# Patient Record
Sex: MALE | Race: BLACK OR AFRICAN AMERICAN | NOT HISPANIC OR LATINO | URBAN - METROPOLITAN AREA
[De-identification: names, ages, dates, MRNs, and addresses within clinical notes are randomized per-mention and may not be internally consistent; named-entity substitution may affect disease eponyms.]

---

## 2016-03-29 RX ORDER — BENZTROPINE MESYLATE 1 MG
1 TABLET ORAL
Qty: 0 | Refills: 0 | DISCHARGE
Start: 2016-03-29 | End: 2016-04-28

## 2017-02-01 ENCOUNTER — EMERGENCY (EMERGENCY)
Facility: HOSPITAL | Age: 22
LOS: 1 days | Discharge: HOME | End: 2017-02-01
Admitting: EMERGENCY MEDICINE
Payer: MEDICAID

## 2017-02-01 VITALS
SYSTOLIC BLOOD PRESSURE: 159 MMHG | DIASTOLIC BLOOD PRESSURE: 89 MMHG | OXYGEN SATURATION: 100 % | RESPIRATION RATE: 16 BRPM | HEART RATE: 118 BPM | TEMPERATURE: 99 F

## 2017-02-01 VITALS — HEART RATE: 80 BPM | OXYGEN SATURATION: 98 %

## 2017-02-01 PROCEDURE — 73130 X-RAY EXAM OF HAND: CPT | Mod: 26,RT

## 2017-02-01 PROCEDURE — 99284 EMERGENCY DEPT VISIT MOD MDM: CPT

## 2017-02-01 PROCEDURE — 90792 PSYCH DIAG EVAL W/MED SRVCS: CPT
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
## 2017-02-01 NOTE — ED ADULT NURSE REASSESSMENT NOTE - NS ED NURSE REASSESS COMMENT FT1
20:25-Patient in improved and stable condition, discharged as per MD Monreal order, discharge instructions given, pt verbalized understanding and left ER a&ox3 with senior care ambulette service.

## 2017-02-01 NOTE — ED PROVIDER NOTE - CARE PLAN
Principal Discharge DX:	Mental retardation  Instructions for follow-up, activity and diet:	Return for swelling, discoloration, increasing pain, decrease range of motion.  Apply ice to area.

## 2017-02-01 NOTE — ED BEHAVIORAL HEALTH ASSESSMENT NOTE - NS ED BHA PLAN TR BH CONTACTED FT
Not available at this hour; Discussed w/ [facility] staff member who stated pt would follow up within the week
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
## 2017-02-01 NOTE — ED BEHAVIORAL HEALTH ASSESSMENT NOTE - SAFETY PLAN DETAILS
Advised to return to ED or call 911 for any suicidal ideation, homicidal thoughts or worsening symptoms. Patient able to recite understanding of instructions

## 2017-02-01 NOTE — ED BEHAVIORAL HEALTH ASSESSMENT NOTE - CURRENT MEDICATION
Cogentin 1mg q HS, Thorazine 25mg TID, Klonopin 0.5mg TID, Flovent HFA 110mcg 1 puff BID, Epipen PRN, Cetirizine 5mg q HS, Risperdal 1mg q HS, Risperdal 2mg BID, Depakote 500mg q AM, Depakote 750mg q HS, Zyprexa 15mg q HS, Prevident gel fresh mint bid, Metoprolol 12.5mg BID, colace 100mg TID, Albuterol HFA 2 puffs q 4 hours PRN, Maalox 30mL q 4 hours PRN
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
## 2017-02-01 NOTE — ED BEHAVIORAL HEALTH ASSESSMENT NOTE - RISK ASSESSMENT
Protective factors include no suicide attempts, medication compliance, supportive housing, no access to guns, no global insomnia, no substance abuse, no active suicidal ideation, no homicidal ideation, hopefulness for future. Risk factors include chronic aggression. Patient is not at any imminent risk for harming self/others and does not require an inpatient psychiatric admission at this time; would be best served by f/u with psychiatrist.
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
## 2017-02-01 NOTE — ED PROVIDER NOTE - CHPI ED SYMPTOMS NEG
no change in level of consciousness/no suicidal/no confusion/no weakness/no hallucinations/no paranoia/no homicidal/no weight loss/no disorientation

## 2017-02-01 NOTE — ED BEHAVIORAL HEALTH ASSESSMENT NOTE - OTHER
[facility] Worker Ms. Sanchez [facility] Worker [facility] Residence peers stereotyped movements euthymic concrete, consistent with MR

## 2017-02-01 NOTE — ED PROVIDER NOTE - PHYSICAL EXAMINATION
Rt hand- palmar region w/ very slight erythema and reports pain on exam- no deformity or swelling on exam

## 2017-02-01 NOTE — ED BEHAVIORAL HEALTH NOTE - BEHAVIORAL HEALTH NOTE
Writer called [facility], [phone], and spoke with Caron, who provided confirmation # [id number], for S ambulance service, to be provided by [facility], [phone], with an ETA of 8PM, to his address, verified to be: [address] [place] [address].

## 2017-02-06 ENCOUNTER — EMERGENCY (EMERGENCY)
Facility: HOSPITAL | Age: 22
LOS: 1 days | Discharge: HOME | End: 2017-02-06
Attending: EMERGENCY MEDICINE | Admitting: EMERGENCY MEDICINE
Payer: MEDICAID

## 2017-02-06 VITALS
OXYGEN SATURATION: 100 % | SYSTOLIC BLOOD PRESSURE: 150 MMHG | HEART RATE: 98 BPM | RESPIRATION RATE: 18 BRPM | TEMPERATURE: 98 F | DIASTOLIC BLOOD PRESSURE: 70 MMHG

## 2017-02-06 PROCEDURE — 99283 EMERGENCY DEPT VISIT LOW MDM: CPT

## 2017-02-06 NOTE — ED PROVIDER NOTE - DETAILS:
The scribe's documentation has been prepared under my direction and personally reviewed by me in its entirety. I confirm that the note above accurately reflects all work, treatment, procedures, and medical decision making performed by me (Dr. King).

## 2017-02-06 NOTE — ED PROVIDER NOTE - MEDICAL DECISION MAKING DETAILS
benign exam.  No evidence of abscess or cellulitis.  Will recommend warm compresses, avoidance of cologne to site, as worker reports that may have been what he put under armpits.

## 2017-02-06 NOTE — ED PROVIDER NOTE - NS ED MD SCRIBE ATTENDING SCRIBE SECTIONS
PHYSICAL EXAM/REVIEW OF SYSTEMS/DISPOSITION/HISTORY OF PRESENT ILLNESS/HIV/VITAL SIGNS( Pullset)/PAST MEDICAL/SURGICAL/SOCIAL HISTORY

## 2017-02-06 NOTE — ED PROVIDER NOTE - OBJECTIVE STATEMENT
20 y/o M w/ PMHx of asthma, autism, MR, mood disorder and Parkinson disease, presents to the ED c/o b/l armpit rash since last night. As per pt aide, pt was seen here in the past for similar sx, was given Hydrocortisone w/ relief. Pt aide states pt had rash last time due to wearing same the same shirt repeatedly. Denies fever, chills or any other complaints. NKDA.

## 2017-03-09 ENCOUNTER — EMERGENCY (EMERGENCY)
Facility: HOSPITAL | Age: 22
LOS: 1 days | Discharge: HOME | End: 2017-03-09
Admitting: EMERGENCY MEDICINE
Payer: MEDICAID

## 2017-03-09 VITALS
OXYGEN SATURATION: 100 % | DIASTOLIC BLOOD PRESSURE: 95 MMHG | SYSTOLIC BLOOD PRESSURE: 149 MMHG | TEMPERATURE: 98 F | HEART RATE: 100 BPM | RESPIRATION RATE: 20 BRPM

## 2017-03-09 VITALS
SYSTOLIC BLOOD PRESSURE: 115 MMHG | OXYGEN SATURATION: 100 % | RESPIRATION RATE: 20 BRPM | TEMPERATURE: 97 F | HEART RATE: 72 BPM | DIASTOLIC BLOOD PRESSURE: 73 MMHG

## 2017-03-09 LAB
ALBUMIN SERPL ELPH-MCNC: 4.7 G/DL — SIGNIFICANT CHANGE UP (ref 3.3–5)
ALP SERPL-CCNC: 43 U/L — SIGNIFICANT CHANGE UP (ref 40–120)
ALT FLD-CCNC: 9 U/L — SIGNIFICANT CHANGE UP (ref 4–41)
AST SERPL-CCNC: 20 U/L — SIGNIFICANT CHANGE UP (ref 4–40)
BASE EXCESS BLDV CALC-SCNC: 2.9 MMOL/L — SIGNIFICANT CHANGE UP
BASOPHILS # BLD AUTO: 0.03 K/UL — SIGNIFICANT CHANGE UP (ref 0–0.2)
BASOPHILS NFR BLD AUTO: 0.8 % — SIGNIFICANT CHANGE UP (ref 0–2)
BILIRUB SERPL-MCNC: 0.3 MG/DL — SIGNIFICANT CHANGE UP (ref 0.2–1.2)
BLOOD GAS VENOUS - CREATININE: 0.84 MG/DL — SIGNIFICANT CHANGE UP (ref 0.5–1.3)
BUN SERPL-MCNC: 15 MG/DL — SIGNIFICANT CHANGE UP (ref 7–23)
CALCIUM SERPL-MCNC: 10 MG/DL — SIGNIFICANT CHANGE UP (ref 8.4–10.5)
CHLORIDE BLDV-SCNC: 104 MMOL/L — SIGNIFICANT CHANGE UP (ref 96–108)
CHLORIDE SERPL-SCNC: 101 MMOL/L — SIGNIFICANT CHANGE UP (ref 98–107)
CO2 SERPL-SCNC: 26 MMOL/L — SIGNIFICANT CHANGE UP (ref 22–31)
CREAT SERPL-MCNC: 0.92 MG/DL — SIGNIFICANT CHANGE UP (ref 0.5–1.3)
EOSINOPHIL # BLD AUTO: 0.02 K/UL — SIGNIFICANT CHANGE UP (ref 0–0.5)
EOSINOPHIL NFR BLD AUTO: 0.5 % — SIGNIFICANT CHANGE UP (ref 0–6)
GAS PNL BLDV: 143 MMOL/L — SIGNIFICANT CHANGE UP (ref 136–146)
GLUCOSE BLDV-MCNC: 86 — SIGNIFICANT CHANGE UP (ref 70–99)
GLUCOSE SERPL-MCNC: 88 MG/DL — SIGNIFICANT CHANGE UP (ref 70–99)
HCO3 BLDV-SCNC: 25 MMOL/L — SIGNIFICANT CHANGE UP (ref 20–27)
HCT VFR BLD CALC: 40.5 % — SIGNIFICANT CHANGE UP (ref 39–50)
HCT VFR BLDV CALC: 41.5 % — SIGNIFICANT CHANGE UP (ref 39–51)
HGB BLD-MCNC: 13.3 G/DL — SIGNIFICANT CHANGE UP (ref 13–17)
HGB BLDV-MCNC: 13.5 G/DL — SIGNIFICANT CHANGE UP (ref 13–17)
IMM GRANULOCYTES NFR BLD AUTO: 0 % — SIGNIFICANT CHANGE UP (ref 0–1.5)
LACTATE BLDV-MCNC: 1.8 MMOL/L — SIGNIFICANT CHANGE UP (ref 0.5–2)
LIDOCAIN IGE QN: 23.2 U/L — SIGNIFICANT CHANGE UP (ref 7–60)
LYMPHOCYTES # BLD AUTO: 1.51 K/UL — SIGNIFICANT CHANGE UP (ref 1–3.3)
LYMPHOCYTES # BLD AUTO: 40.6 % — SIGNIFICANT CHANGE UP (ref 13–44)
MCHC RBC-ENTMCNC: 28.3 PG — SIGNIFICANT CHANGE UP (ref 27–34)
MCHC RBC-ENTMCNC: 32.8 % — SIGNIFICANT CHANGE UP (ref 32–36)
MCV RBC AUTO: 86.2 FL — SIGNIFICANT CHANGE UP (ref 80–100)
MONOCYTES # BLD AUTO: 0.58 K/UL — SIGNIFICANT CHANGE UP (ref 0–0.9)
MONOCYTES NFR BLD AUTO: 15.6 % — HIGH (ref 2–14)
NEUTROPHILS # BLD AUTO: 1.58 K/UL — LOW (ref 1.8–7.4)
NEUTROPHILS NFR BLD AUTO: 42.5 % — LOW (ref 43–77)
PCO2 BLDV: 52 MMHG — HIGH (ref 41–51)
PH BLDV: 7.35 PH — SIGNIFICANT CHANGE UP (ref 7.32–7.43)
PLATELET # BLD AUTO: 191 K/UL — SIGNIFICANT CHANGE UP (ref 150–400)
PMV BLD: 11.8 FL — SIGNIFICANT CHANGE UP (ref 7–13)
PO2 BLDV: 35 MMHG — SIGNIFICANT CHANGE UP (ref 35–40)
POTASSIUM BLDV-SCNC: 4.5 MMOL/L — SIGNIFICANT CHANGE UP (ref 3.4–4.5)
POTASSIUM SERPL-MCNC: 4.6 MMOL/L — SIGNIFICANT CHANGE UP (ref 3.5–5.3)
POTASSIUM SERPL-SCNC: 4.6 MMOL/L — SIGNIFICANT CHANGE UP (ref 3.5–5.3)
PROT SERPL-MCNC: 8.4 G/DL — HIGH (ref 6–8.3)
RBC # BLD: 4.7 M/UL — SIGNIFICANT CHANGE UP (ref 4.2–5.8)
RBC # FLD: 13.6 % — SIGNIFICANT CHANGE UP (ref 10.3–14.5)
SAO2 % BLDV: 59.7 % — LOW (ref 60–85)
SODIUM SERPL-SCNC: 141 MMOL/L — SIGNIFICANT CHANGE UP (ref 135–145)
WBC # BLD: 3.72 K/UL — LOW (ref 3.8–10.5)
WBC # FLD AUTO: 3.72 K/UL — LOW (ref 3.8–10.5)

## 2017-03-09 PROCEDURE — 71020: CPT | Mod: 26

## 2017-03-09 PROCEDURE — 99284 EMERGENCY DEPT VISIT MOD MDM: CPT

## 2017-03-09 RX ORDER — FAMOTIDINE 10 MG/ML
1 INJECTION INTRAVENOUS
Qty: 14 | Refills: 0
Start: 2017-03-09 | End: 2017-03-16

## 2017-03-09 RX ORDER — ONDANSETRON 8 MG/1
4 TABLET, FILM COATED ORAL ONCE
Qty: 0 | Refills: 0 | Status: COMPLETED | OUTPATIENT
Start: 2017-03-09 | End: 2017-03-09

## 2017-03-09 RX ORDER — ONDANSETRON 8 MG/1
1 TABLET, FILM COATED ORAL
Qty: 15 | Refills: 0
Start: 2017-03-09 | End: 2017-03-14

## 2017-03-09 RX ORDER — SODIUM CHLORIDE 9 MG/ML
2000 INJECTION INTRAMUSCULAR; INTRAVENOUS; SUBCUTANEOUS ONCE
Qty: 0 | Refills: 0 | Status: COMPLETED | OUTPATIENT
Start: 2017-03-09 | End: 2017-03-09

## 2017-03-09 RX ORDER — FAMOTIDINE 10 MG/ML
20 INJECTION INTRAVENOUS ONCE
Qty: 0 | Refills: 0 | Status: COMPLETED | OUTPATIENT
Start: 2017-03-09 | End: 2017-03-09

## 2017-03-09 RX ADMIN — SODIUM CHLORIDE 2000 MILLILITER(S): 9 INJECTION INTRAMUSCULAR; INTRAVENOUS; SUBCUTANEOUS at 18:32

## 2017-03-09 RX ADMIN — ONDANSETRON 4 MILLIGRAM(S): 8 TABLET, FILM COATED ORAL at 18:32

## 2017-03-09 RX ADMIN — FAMOTIDINE 20 MILLIGRAM(S): 10 INJECTION INTRAVENOUS at 18:32

## 2017-03-09 NOTE — ED PROVIDER NOTE - OBJECTIVE STATEMENT
Pt is a 20 y/o M nonsmoker PMHx Asthma, Autism, MR, mood disorder p/w nausea, vomiting and diarrhea x 2 days.  Collateral information provided by accompanying  worker, Jaycob Noriega.  Pt notes having generalized, mild abdominal cramping associated with nausea and loose, nonbloody stools yesterday.  Pt states today has had persistent nausea with nonbloody, nonbilious vomiting 2-3 times today.  Pt states abdominal cramping improved today.  Pt notes occasional cough.  Denies fevers, chills, constipation, chest pain, SOB, hemetamesis, hemoptysis, wheezing, melena, dysuria, cloudy urine, flank pain, abdominal trauma, headache, numbness, weakness, sick contacts, recent travel history, recent hospitalizations.  Pt recently completed antibiotics, unsure which, for recent "upper respiratory infection."  As per  Worker, Jaycob Noriega, pt's mental status is at baseline. Pt is a 22 y/o M nonsmoker PMHx Asthma, Autism, MR, mood disorder p/w nausea, vomiting and diarrhea x 2 days.  Collateral information provided by accompanying  worker, Jaycob Noriega.  Pt notes having generalized, mild abdominal cramping associated with nausea and loose, nonbloody stools yesterday.  Pt states today has had persistent nausea with nonbloody, nonbilious vomiting 2-3 times today.  Pt states abdominal cramping improved today.  Pt notes occasional cough.  Denies fevers, chills, constipation, chest pain, SOB, hemetamesis, hemoptysis, wheezing, melena, dysuria, cloudy urine, flank pain, abdominal trauma, headache, numbness, weakness, rash, sick contacts, recent travel history, recent hospitalizations.  Pt recently completed antibiotics, unsure which, for recent "upper respiratory infection."  As per  Worker, aJycob Noriega, pt's mental status is at baseline.

## 2017-03-09 NOTE — ED PROVIDER NOTE - PLAN OF CARE
Rest, drink plenty of fluids.  Advance activity as tolerated.  Continue all previously prescribed medications as directed.  Take Pepcid 20 mg twice a day as needed for indigestion. Take Zofran 4 mg ODT every 8 hours as needed for nausea and vomiting. Follow up with your primary care physician and gastroenterology (referral list provided) in 48-72 hours- bring copies of your results.  Return to the ER for worsening or persistent symptoms, including but not limited to worsening/persistent pain, nausea, vomiting, bloody diarrhea, vomiting blood, dark black stools, and/or ANY NEW OR CONCERNING SYMPTOMS. If you have issues obtaining follow up, please call: [phone] to obtain a doctor or specialist who takes your insurance in your area.

## 2017-03-09 NOTE — ED PROVIDER NOTE - PROGRESS NOTE DETAILS
STANTON Lyn:  Pt endorses resolution of nausea, vomiting.  Pt tolerating PO.  Repeat abdominal exam: soft, nontender, no guarding, no rigidity.  Pt medically stable for discharge.  Pt to follow up with PMD and gastroenterology (referral list provided).

## 2017-03-09 NOTE — ED PROVIDER NOTE - MEDICAL DECISION MAKING DETAILS
Pt is a 22 y/o M nonsmoker PMHx Asthma, Autism, MR, mood disorder p/w nausea, vomiting and diarrhea x 2 days -- likely gastroenteritis, no e/o appendicitis, no e/o cholecystitis, no e/o diverticulitis-- labs, lipase, lactate, fluids, zofran, pepcid, IV fluids, reassess, PO challenge

## 2017-03-09 NOTE — ED PROVIDER NOTE - NONTENDER LOCATION
right lower quadrant/periumbilical/umbilical/left lower quadrant/right costovertebral angle/left costovertebral angle/left upper quadrant/right upper quadrant/suprapubic

## 2017-03-09 NOTE — ED PROVIDER NOTE - CHPI ED SYMPTOMS NEG
no dysuria/no abdominal distension/no fever/no burning urination/no chills/no blood in stool/no hematuria

## 2017-03-09 NOTE — ED PROVIDER NOTE - CARE PLAN
Principal Discharge DX:	Gastroenteritis Principal Discharge DX:	Gastroenteritis  Instructions for follow-up, activity and diet:	Rest, drink plenty of fluids.  Advance activity as tolerated.  Continue all previously prescribed medications as directed.  Take Pepcid 20 mg twice a day as needed for indigestion. Take Zofran 4 mg ODT every 8 hours as needed for nausea and vomiting. Follow up with your primary care physician and gastroenterology (referral list provided) in 48-72 hours- bring copies of your results.  Return to the ER for worsening or persistent symptoms, including but not limited to worsening/persistent pain, nausea, vomiting, bloody diarrhea, vomiting blood, dark black stools, and/or ANY NEW OR CONCERNING SYMPTOMS. If you have issues obtaining follow up, please call: [phone] to obtain a doctor or specialist who takes your insurance in your area.

## 2017-05-02 ENCOUNTER — EMERGENCY (EMERGENCY)
Facility: HOSPITAL | Age: 22
LOS: 1 days | Discharge: HOME | End: 2017-05-02
Admitting: EMERGENCY MEDICINE
Payer: MEDICAID

## 2017-05-02 VITALS
TEMPERATURE: 99 F | OXYGEN SATURATION: 100 % | SYSTOLIC BLOOD PRESSURE: 150 MMHG | HEART RATE: 125 BPM | DIASTOLIC BLOOD PRESSURE: 84 MMHG | RESPIRATION RATE: 18 BRPM

## 2017-05-02 VITALS
DIASTOLIC BLOOD PRESSURE: 72 MMHG | RESPIRATION RATE: 16 BRPM | TEMPERATURE: 98 F | SYSTOLIC BLOOD PRESSURE: 116 MMHG | OXYGEN SATURATION: 100 % | HEART RATE: 84 BPM

## 2017-05-02 PROCEDURE — 99284 EMERGENCY DEPT VISIT MOD MDM: CPT

## 2017-05-02 PROCEDURE — 71020: CPT | Mod: 26

## 2017-05-02 NOTE — ED PROVIDER NOTE - OBJECTIVE STATEMENT
22 y/o male, coming to the ER from a group home, poor historian unable to tell PMH except for HTN, p/w sore throat x 2 days. Pt reports pain is worse when swallowing. Pt also reports a cough w/ clear mucus production x 2days which causes a headache in the front of his head b/l, and chest pain only with coughing. Pt reports subjective fevers but has not taken his temperature. He reports a recent sick contact of his roommate. 22 y/o male, coming to the ER from a group home, poor historian unable to tell PMH except for HTN, p/w sore throat x 2 days. Pt reports pain is worse when swallowing. Pt also reports a cough w/ clear mucus production x 2days which causes a headache in the front of his head b/l, and chest pain only with coughing. Pt reports subjective fevers but has not taken his temperature. He reports a recent sick contact of his roommate. Denies any other symptoms. 20 y/o male, coming to the ER from a group home [facility], poor historian unable to tell PMH except for HTN, p/w sore throat x 2 days. Pt reports pain is worse when swallowing. Pt also reports a cough w/ clear mucus production x 2days which causes a headache in the front of his head b/l, and chest pain only with coughing. Pt reports subjective fevers but has not taken his temperature. He reports a recent sick contact of his roommate. Denies any other symptoms.

## 2017-05-02 NOTE — ED PROVIDER NOTE - MEDICAL DECISION MAKING DETAILS
22 y/o M p/w sore throat, coughing that causes headache and chest pain, subjective fevers, recent sick contacts consistent with URI, r/o pneumonia  -CXR

## 2017-05-02 NOTE — ED ADULT TRIAGE NOTE - CHIEF COMPLAINT QUOTE
pt presents from group home c/o sore throat and headache x couple days. pt anxious and fidgety in triage. per staff member, pt is a hypochondriac PMHX: asthma, hypochondria

## 2017-05-28 ENCOUNTER — EMERGENCY (EMERGENCY)
Facility: HOSPITAL | Age: 22
LOS: 1 days | Discharge: HOME | End: 2017-05-28
Attending: EMERGENCY MEDICINE | Admitting: EMERGENCY MEDICINE
Payer: MEDICAID

## 2017-05-28 VITALS
OXYGEN SATURATION: 100 % | HEART RATE: 129 BPM | SYSTOLIC BLOOD PRESSURE: 152 MMHG | TEMPERATURE: 98 F | RESPIRATION RATE: 19 BRPM | DIASTOLIC BLOOD PRESSURE: 91 MMHG

## 2017-05-28 VITALS
SYSTOLIC BLOOD PRESSURE: 120 MMHG | DIASTOLIC BLOOD PRESSURE: 75 MMHG | OXYGEN SATURATION: 100 % | RESPIRATION RATE: 16 BRPM | HEART RATE: 82 BPM

## 2017-05-28 PROCEDURE — 71020: CPT | Mod: 26

## 2017-05-28 PROCEDURE — 99285 EMERGENCY DEPT VISIT HI MDM: CPT

## 2017-05-28 RX ORDER — SODIUM CHLORIDE 9 MG/ML
1000 INJECTION INTRAMUSCULAR; INTRAVENOUS; SUBCUTANEOUS ONCE
Qty: 0 | Refills: 0 | Status: COMPLETED | OUTPATIENT
Start: 2017-05-28 | End: 2017-05-28

## 2017-05-28 RX ORDER — IPRATROPIUM/ALBUTEROL SULFATE 18-103MCG
3 AEROSOL WITH ADAPTER (GRAM) INHALATION ONCE
Qty: 0 | Refills: 0 | Status: COMPLETED | OUTPATIENT
Start: 2017-05-28 | End: 2017-05-28

## 2017-05-28 RX ADMIN — Medication 100 MILLIGRAM(S): at 13:30

## 2017-05-28 RX ADMIN — Medication 3 MILLILITER(S): at 13:30

## 2017-05-28 RX ADMIN — SODIUM CHLORIDE 1000 MILLILITER(S): 9 INJECTION INTRAMUSCULAR; INTRAVENOUS; SUBCUTANEOUS at 15:14

## 2017-05-28 NOTE — ED PROVIDER NOTE - PROGRESS NOTE DETAILS
STANTON Aragon: Pt had episode of post tussive emesis. Otherwise pt doing well, walking around ED, talking, looks well STANTON Aragon: Xray imaging compared to one done on May 2nd, Possible consolidation, will treat. Pt may be on possible QT prolonging drugs, will avoid levaquin. Will treat with doxycyline. STANTON Aragon: Pt feeling better, Looks well, wants IV taken out, Stable for dc

## 2017-05-28 NOTE — ED PROVIDER NOTE - CARE PLAN
Principal Discharge DX:	Bronchitis  Instructions for follow-up, activity and diet:	Follow up with your Primary medical doctor within 48-72 hours.  Rest, increase fluids. Albuterol inhaler 2 inhalations every 4-6 hours as needed. Take Doxycycline 100mg twice a day x 7 days. Take Tessalon Perles as needed for cough. Take all of your other medications as previously prescribed. Any worsening wheezing, shortness of breath, chest pain, fever, chills return to the ED Principal Discharge DX:	Cough  Instructions for follow-up, activity and diet:	Follow up with your Primary medical doctor within 48-72 hours.  Rest, increase fluids. Albuterol inhaler 2 inhalations every 4-6 hours as needed. Take Doxycycline 100mg twice a day x 7 days. Take Tessalon Perles as needed for cough. Take all of your other medications as previously prescribed. Any worsening wheezing, shortness of breath, chest pain, fever, chills return to the ED

## 2017-05-28 NOTE — ED PROVIDER NOTE - MEDICAL DECISION MAKING DETAILS
22 yo male with productive cough and congestion, nausea  Plan: cxr, duoneb, tessalon perrles, IVF, reassess 22 yo male with productive cough and congestion, nausea  Plan: cxr, duoneb, tessalon perle, IVF, reassess

## 2017-05-28 NOTE — ED PROVIDER NOTE - OBJECTIVE STATEMENT
20 yo male with PMHx of MR, Autism, mild asthma presents to the ED c/o productive green sputum cough. Pt states cough has been ongoing worsening x 7 days. Pt c/o headache, mild sore throat, runny nose. Pt poor historian looks to staff care worker at bedside for confirmation of his symptoms. As per patients staff care worker pt was seen here a month ago for same symptoms told to take Robitussin, which has helped minimally with cough. Denies cp ,sob, trouble breathing, fever, chills, nausea, vomiting.

## 2017-05-28 NOTE — ED ADULT TRIAGE NOTE - CHIEF COMPLAINT QUOTE
Hx of MR and autism. Co runny nose, dry cough and sore throat x 1 week. Father states patient was seen in ED and told to take Robitussin but it is not helping.

## 2017-05-28 NOTE — ED PROVIDER NOTE - PLAN OF CARE
Follow up with your Primary medical doctor within 48-72 hours.  Rest, increase fluids. Albuterol inhaler 2 inhalations every 4-6 hours as needed. Take Doxycycline 100mg twice a day x 7 days. Take Tessalon Perles as needed for cough. Take all of your other medications as previously prescribed. Any worsening wheezing, shortness of breath, chest pain, fever, chills return to the ED

## 2017-05-28 NOTE — ED PROVIDER NOTE - ATTENDING CONTRIBUTION TO CARE
Dr. Martinez I , Cherry Carvalho M.D. have examined the patient and confirmed the essential components of the history, physical examination, diagnosis, and treatment plan. I agree with the patient's care as documented by the PA and amended herein by me. See note above for complete details of service.  20 yo M Hx Asthma, Autism, MR, Artis DO who presents with persistent productive cough x 7 days. Pt requesting IVF. ROS + post-tussive emesis, sore throat and rhinorrhea. Similar symptoms 1 month ago. OTC meds without relief. On exam pt well appearing, moist mucus membranes, clear lungs. Pt observed with frequent cough and post-tussive emesis/induced vomiting in ED. Pt received IVF, nebs and tessalon perle with reported symptom relief CXR concerning for increased opacity at R hilar region ?PNA. Pt rx doxycycline to cover for pna. Pt stable for discharge. PMD follow up within 24 - 48 hours. DC instructions and warning signs for return given. Caregiver at bedside and understands ED course and discharge instructions.

## 2017-06-30 ENCOUNTER — EMERGENCY (EMERGENCY)
Facility: HOSPITAL | Age: 22
LOS: 1 days | Discharge: HOME | End: 2017-06-30
Attending: EMERGENCY MEDICINE | Admitting: EMERGENCY MEDICINE
Payer: MEDICAID

## 2017-06-30 VITALS
DIASTOLIC BLOOD PRESSURE: 82 MMHG | SYSTOLIC BLOOD PRESSURE: 146 MMHG | OXYGEN SATURATION: 100 % | RESPIRATION RATE: 20 BRPM | HEART RATE: 87 BPM | TEMPERATURE: 98 F

## 2017-06-30 PROCEDURE — 73110 X-RAY EXAM OF WRIST: CPT | Mod: 26,RT

## 2017-06-30 PROCEDURE — 99282 EMERGENCY DEPT VISIT SF MDM: CPT

## 2017-06-30 RX ORDER — ACETAMINOPHEN 500 MG
650 TABLET ORAL ONCE
Qty: 0 | Refills: 0 | Status: COMPLETED | OUTPATIENT
Start: 2017-06-30 | End: 2017-06-30

## 2017-06-30 RX ADMIN — Medication 650 MILLIGRAM(S): at 15:57

## 2017-06-30 NOTE — ED PROVIDER NOTE - PROGRESS NOTE DETAILS
STANTON Hoffmann: discussed result of XR with ortho, likely positional. Can follow up outpatient if pain continued. will d/c with supportive care.

## 2017-06-30 NOTE — ED PROVIDER NOTE - ATTENDING CONTRIBUTION TO CARE
I performed the initial face to face bedside interview with this patient regarding history of present illness, review of symptoms and past medical, social and family history.  I completed an independent physical examination.  I was the initial provider who evaluated this patient.  The history, review of symptoms and examination was documented by the scribe and edited by me.  I have signed out the follow up of any pending tests (i.e. labs, radiological studies) to the PA.  I have discussed the patient’s plan of care and disposition with the PA.

## 2017-06-30 NOTE — ED PROVIDER NOTE - OBJECTIVE STATEMENT
21y M presents to the ED for right hand pain. States he punched a gate because he was upset. Has a history of autism and ADHD. Denies abd pain or any other complaints. Denies previous surgeries and allergies. 21y M presents to the ED for right hand pain. States he hit his wirst against a gate because he was upset. Has a history of autism and ADHD. Denies abd pain or any other complaints. Denies previous surgeries and allergies.

## 2017-06-30 NOTE — ED PROVIDER NOTE - PLAN OF CARE
If your pain continues follow up with orthopedic doctor -see list. Rest, drink plenty of fluids.  Advance activity as tolerated.  Continue all previously prescribed medications as directed. You can use motrin 600mg every 6-8 hours for pain or fever, and/or Tylenol 650 mg every 4 hours for pain/fever. Follow up with your primary care physician in 48-72 hours- bring copies of your results.  Return to the emergency department for chest pain, shortness of breath, dizziness, or worsening, concerning, or persistent symptoms.

## 2017-06-30 NOTE — ED PROVIDER NOTE - CARE PLAN
Principal Discharge DX:	Wrist pain  Instructions for follow-up, activity and diet:	If your pain continues follow up with orthopedic doctor -see list. Rest, drink plenty of fluids.  Advance activity as tolerated.  Continue all previously prescribed medications as directed. You can use motrin 600mg every 6-8 hours for pain or fever, and/or Tylenol 650 mg every 4 hours for pain/fever. Follow up with your primary care physician in 48-72 hours- bring copies of your results.  Return to the emergency department for chest pain, shortness of breath, dizziness, or worsening, concerning, or persistent symptoms.

## 2017-06-30 NOTE — ED PROVIDER NOTE - PHYSICAL EXAMINATION
ATTENDING PHYSICAL EXAM DR. ORELLANA ***GEN - NAD; well appearing; A+O x3 ***HEAD - NC/AT *  ***ABDOMEN - +BS, ND, NT, soft, no guarding, no rebound, no masses   ***BACK - no CVA tenderness, Normal  spine ***EXTREMITIES - symmetric pulses, 2+ dp, capillary refill < 2 seconds, no clubbing, no cyanosis, no edema, medial aspect of wrist at distal ulnar medial aspect, FROM, no snuff box tend, no deformtiy, no warmth ***SKIN - no rash or bruising   ***NEUROLOGIC - alert

## 2017-09-30 ENCOUNTER — EMERGENCY (EMERGENCY)
Facility: HOSPITAL | Age: 22
LOS: 1 days | Discharge: HOME | End: 2017-09-30
Admitting: EMERGENCY MEDICINE
Payer: SELF-PAY

## 2017-09-30 VITALS
OXYGEN SATURATION: 100 % | TEMPERATURE: 98 F | HEART RATE: 110 BPM | RESPIRATION RATE: 20 BRPM | DIASTOLIC BLOOD PRESSURE: 82 MMHG | SYSTOLIC BLOOD PRESSURE: 130 MMHG

## 2017-09-30 PROCEDURE — 99283 EMERGENCY DEPT VISIT LOW MDM: CPT
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
## 2017-09-30 NOTE — ED PROVIDER NOTE - MEDICAL DECISION MAKING DETAILS
21 y/o M hx Autism, MR, Mood Disorder   No evidence of physical injuries, broken skin or deformities.    Medical evaluation performed. There is no clinical evidence of intoxication or any acute medical problem requiring immediate intervention. Patient is awaiting psychiatric consultation. Final disposition will be determined by psychiatrist.

## 2017-09-30 NOTE — ED BEHAVIORAL HEALTH NOTE - BEHAVIORAL HEALTH NOTE
Writer was informed patient was medically and psychiatrically cleared for discharge.  Writer called pt's residence  to confirm address and spoke to Kimberly who states Lashanda is on her way to transport patient home.  Writer informed nursing.

## 2017-09-30 NOTE — ED ADULT NURSE NOTE - OBJECTIVE STATEMENT
Received pt in   pt bought in for agitation & aggression from [facility], pt agitated medicated as per NP rx, eval on going.
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
## 2017-09-30 NOTE — ED ADULT NURSE REASSESSMENT NOTE - NS ED NURSE REASSESS COMMENT FT1
Patient presents anxious, irritable, medication given as ordered, pt put into  gowns, currently in  room chair awaiting further MD evaluation, will continue to monitor pt.

## 2018-02-18 ENCOUNTER — EMERGENCY (EMERGENCY)
Facility: HOSPITAL | Age: 23
LOS: 0 days | Discharge: HOME | End: 2018-02-18
Attending: EMERGENCY MEDICINE
Payer: MEDICAID

## 2018-02-18 VITALS
HEART RATE: 115 BPM | SYSTOLIC BLOOD PRESSURE: 112 MMHG | RESPIRATION RATE: 18 BRPM | DIASTOLIC BLOOD PRESSURE: 76 MMHG | TEMPERATURE: 99 F | OXYGEN SATURATION: 98 %

## 2018-02-18 VITALS
SYSTOLIC BLOOD PRESSURE: 125 MMHG | OXYGEN SATURATION: 98 % | HEART RATE: 89 BPM | DIASTOLIC BLOOD PRESSURE: 55 MMHG | TEMPERATURE: 100 F | WEIGHT: 130.07 LBS | RESPIRATION RATE: 18 BRPM | HEIGHT: 68 IN

## 2018-02-18 DIAGNOSIS — G20 PARKINSON'S DISEASE: ICD-10-CM

## 2018-02-18 DIAGNOSIS — F79 UNSPECIFIED INTELLECTUAL DISABILITIES: ICD-10-CM

## 2018-02-18 DIAGNOSIS — Z91.010 ALLERGY TO PEANUTS: ICD-10-CM

## 2018-02-18 DIAGNOSIS — F39 UNSPECIFIED MOOD [AFFECTIVE] DISORDER: ICD-10-CM
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
## 2018-02-18 DIAGNOSIS — J45.909 UNSPECIFIED ASTHMA, UNCOMPLICATED: ICD-10-CM

## 2018-02-18 DIAGNOSIS — F84.0 AUTISTIC DISORDER: ICD-10-CM

## 2018-02-18 DIAGNOSIS — R46.1 BIZARRE PERSONAL APPEARANCE: ICD-10-CM
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
## 2018-02-18 DIAGNOSIS — S40.211A ABRASION OF RIGHT SHOULDER, INITIAL ENCOUNTER: ICD-10-CM

## 2018-02-18 PROCEDURE — 99284 EMERGENCY DEPT VISIT MOD MDM: CPT

## 2018-02-18 PROCEDURE — 73030 X-RAY EXAM OF SHOULDER: CPT | Mod: 26,RT

## 2018-02-18 PROCEDURE — 73620 X-RAY EXAM OF FOOT: CPT | Mod: 26,RT

## 2018-02-18 NOTE — ED PROVIDER NOTE - PHYSICAL EXAMINATION
Gen: Alert, NAD  Head: NC, AT, PERRL, EOMI, normal lids/conjunctiva  ENT: B TM WNL, normal hearing, patent oropharynx without erythema/exudate, uvula midline  Neck: +supple, no tenderness/meningismus/JVD, +Trachea midline  Pulm: Bilateral BS, normal resp effort, no wheeze/stridor/retractions  CV: RRR, no M/R/G, +dist pulses  Abd: soft, NT/ND, +BS, no hepatosplenomegaly  Mskel: no edema/erythema/cyanosis, R shoulder full ROM, sensations intact a/u/r/m, str 5/5, good , no obv deformity  Skin: abrasion R shoulder  Neuro: AAOx3, no sensory/motor deficits, CN 2-12 intact
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
## 2018-02-27 ENCOUNTER — EMERGENCY (EMERGENCY)
Facility: HOSPITAL | Age: 23
LOS: 0 days | Discharge: HOME | End: 2018-02-28
Attending: EMERGENCY MEDICINE
Payer: MEDICAID

## 2018-02-27 VITALS
SYSTOLIC BLOOD PRESSURE: 147 MMHG | HEIGHT: 70 IN | HEART RATE: 130 BPM | WEIGHT: 130.07 LBS | RESPIRATION RATE: 21 BRPM | TEMPERATURE: 98 F | OXYGEN SATURATION: 97 % | DIASTOLIC BLOOD PRESSURE: 90 MMHG

## 2018-02-27 DIAGNOSIS — F91.8 OTHER CONDUCT DISORDERS: ICD-10-CM

## 2018-02-27 DIAGNOSIS — G20 PARKINSON'S DISEASE: ICD-10-CM

## 2018-02-27 DIAGNOSIS — F79 UNSPECIFIED INTELLECTUAL DISABILITIES: ICD-10-CM

## 2018-02-27 DIAGNOSIS — F39 UNSPECIFIED MOOD [AFFECTIVE] DISORDER: ICD-10-CM

## 2018-02-27 DIAGNOSIS — J45.909 UNSPECIFIED ASTHMA, UNCOMPLICATED: ICD-10-CM

## 2018-02-27 DIAGNOSIS — Z79.51 LONG TERM (CURRENT) USE OF INHALED STEROIDS: ICD-10-CM

## 2018-02-27 DIAGNOSIS — F84.0 AUTISTIC DISORDER: ICD-10-CM

## 2018-02-27 DIAGNOSIS — R44.1 VISUAL HALLUCINATIONS: ICD-10-CM

## 2018-02-27 DIAGNOSIS — R44.0 AUDITORY HALLUCINATIONS: ICD-10-CM

## 2018-02-27 LAB
ALBUMIN SERPL ELPH-MCNC: 4.1 G/DL — SIGNIFICANT CHANGE UP (ref 3.3–5)
ALP SERPL-CCNC: 48 U/L — SIGNIFICANT CHANGE UP (ref 40–120)
ALT FLD-CCNC: 24 U/L — SIGNIFICANT CHANGE UP (ref 12–78)
AMPHET UR-MCNC: NEGATIVE — SIGNIFICANT CHANGE UP
ANION GAP SERPL CALC-SCNC: 10 MMOL/L — SIGNIFICANT CHANGE UP (ref 5–17)
APAP SERPL-MCNC: < 2 UG/ML (ref 10–30)
APPEARANCE UR: CLEAR — SIGNIFICANT CHANGE UP
APTT BLD: 32.8 SEC — SIGNIFICANT CHANGE UP (ref 27.5–37.4)
AST SERPL-CCNC: 22 U/L — SIGNIFICANT CHANGE UP (ref 15–37)
BACTERIA # UR AUTO: ABNORMAL
BARBITURATES UR SCN-MCNC: NEGATIVE — SIGNIFICANT CHANGE UP
BASOPHILS # BLD AUTO: 0.04 K/UL — SIGNIFICANT CHANGE UP (ref 0–0.2)
BASOPHILS NFR BLD AUTO: 0.7 % — SIGNIFICANT CHANGE UP (ref 0–2)
BENZODIAZ UR-MCNC: NEGATIVE — SIGNIFICANT CHANGE UP
BILIRUB SERPL-MCNC: 0.4 MG/DL — SIGNIFICANT CHANGE UP (ref 0.2–1.2)
BILIRUB UR-MCNC: NEGATIVE — SIGNIFICANT CHANGE UP
BUN SERPL-MCNC: 14 MG/DL — SIGNIFICANT CHANGE UP (ref 7–23)
CALCIUM SERPL-MCNC: 8.9 MG/DL — SIGNIFICANT CHANGE UP (ref 8.5–10.1)
CHLORIDE SERPL-SCNC: 106 MMOL/L — SIGNIFICANT CHANGE UP (ref 96–108)
CO2 SERPL-SCNC: 25 MMOL/L — SIGNIFICANT CHANGE UP (ref 22–31)
COCAINE METAB.OTHER UR-MCNC: NEGATIVE — SIGNIFICANT CHANGE UP
COLOR SPEC: YELLOW — SIGNIFICANT CHANGE UP
CREAT SERPL-MCNC: 0.86 MG/DL — SIGNIFICANT CHANGE UP (ref 0.5–1.3)
DIFF PNL FLD: NEGATIVE — SIGNIFICANT CHANGE UP
EOSINOPHIL # BLD AUTO: 0.01 K/UL — SIGNIFICANT CHANGE UP (ref 0–0.5)
EOSINOPHIL NFR BLD AUTO: 0.2 % — SIGNIFICANT CHANGE UP (ref 0–6)
ETHANOL SERPL-MCNC: <10 MG/DL — SIGNIFICANT CHANGE UP (ref 0–10)
GLUCOSE SERPL-MCNC: 83 MG/DL — SIGNIFICANT CHANGE UP (ref 70–99)
GLUCOSE UR QL: NEGATIVE MG/DL — SIGNIFICANT CHANGE UP
HCT VFR BLD CALC: 39.8 % — SIGNIFICANT CHANGE UP (ref 39–50)
HGB BLD-MCNC: 13.3 G/DL — SIGNIFICANT CHANGE UP (ref 13–17)
IMM GRANULOCYTES NFR BLD AUTO: 0.2 % — SIGNIFICANT CHANGE UP (ref 0–1.5)
INR BLD: 1.14 RATIO — SIGNIFICANT CHANGE UP (ref 0.88–1.16)
KETONES UR-MCNC: NEGATIVE — SIGNIFICANT CHANGE UP
LEUKOCYTE ESTERASE UR-ACNC: NEGATIVE — SIGNIFICANT CHANGE UP
LYMPHOCYTES # BLD AUTO: 1.58 K/UL — SIGNIFICANT CHANGE UP (ref 1–3.3)
LYMPHOCYTES # BLD AUTO: 27.2 % — SIGNIFICANT CHANGE UP (ref 13–44)
MCHC RBC-ENTMCNC: 28.4 PG — SIGNIFICANT CHANGE UP (ref 27–34)
MCHC RBC-ENTMCNC: 33.4 GM/DL — SIGNIFICANT CHANGE UP (ref 32–36)
MCV RBC AUTO: 84.9 FL — SIGNIFICANT CHANGE UP (ref 80–100)
METHADONE UR-MCNC: NEGATIVE — SIGNIFICANT CHANGE UP
MONOCYTES # BLD AUTO: 0.7 K/UL — SIGNIFICANT CHANGE UP (ref 0–0.9)
MONOCYTES NFR BLD AUTO: 12 % — SIGNIFICANT CHANGE UP (ref 2–14)
NEUTROPHILS # BLD AUTO: 3.47 K/UL — SIGNIFICANT CHANGE UP (ref 1.8–7.4)
NEUTROPHILS NFR BLD AUTO: 59.7 % — SIGNIFICANT CHANGE UP (ref 43–77)
NITRITE UR-MCNC: NEGATIVE — SIGNIFICANT CHANGE UP
NRBC # BLD: 0 /100 WBCS — SIGNIFICANT CHANGE UP (ref 0–0)
OPIATES UR-MCNC: NEGATIVE — SIGNIFICANT CHANGE UP
PCP SPEC-MCNC: SIGNIFICANT CHANGE UP
PCP UR-MCNC: NEGATIVE — SIGNIFICANT CHANGE UP
PH UR: 7 — SIGNIFICANT CHANGE UP (ref 5–8)
PLATELET # BLD AUTO: 195 K/UL — SIGNIFICANT CHANGE UP (ref 150–400)
POTASSIUM SERPL-MCNC: 3.7 MMOL/L — SIGNIFICANT CHANGE UP (ref 3.5–5.3)
POTASSIUM SERPL-SCNC: 3.7 MMOL/L — SIGNIFICANT CHANGE UP (ref 3.5–5.3)
PROT SERPL-MCNC: 8.3 GM/DL — SIGNIFICANT CHANGE UP (ref 6–8.3)
PROT UR-MCNC: NEGATIVE MG/DL — SIGNIFICANT CHANGE UP
PROTHROM AB SERPL-ACNC: 12.5 SEC — SIGNIFICANT CHANGE UP (ref 9.8–12.7)
RBC # BLD: 4.69 M/UL — SIGNIFICANT CHANGE UP (ref 4.2–5.8)
RBC # FLD: 13.5 % — SIGNIFICANT CHANGE UP (ref 10.3–14.5)
SALICYLATES SERPL-MCNC: <1.7 MG/DL — LOW (ref 2.8–20)
SODIUM SERPL-SCNC: 141 MMOL/L — SIGNIFICANT CHANGE UP (ref 135–145)
SP GR SPEC: 1 — LOW (ref 1.01–1.02)
THC UR QL: NEGATIVE — SIGNIFICANT CHANGE UP
UROBILINOGEN FLD QL: NEGATIVE MG/DL — SIGNIFICANT CHANGE UP
WBC # BLD: 5.81 K/UL — SIGNIFICANT CHANGE UP (ref 3.8–10.5)
WBC # FLD AUTO: 5.81 K/UL — SIGNIFICANT CHANGE UP (ref 3.8–10.5)

## 2018-02-27 PROCEDURE — 71045 X-RAY EXAM CHEST 1 VIEW: CPT | Mod: 26

## 2018-02-27 PROCEDURE — 99284 EMERGENCY DEPT VISIT MOD MDM: CPT

## 2018-02-27 PROCEDURE — 90792 PSYCH DIAG EVAL W/MED SRVCS: CPT | Mod: GT
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
## 2018-02-27 NOTE — ED ADULT NURSE NOTE - OBJECTIVE STATEMENT
Patient want brought into the Ed by staff at his living facility for aggressive behavior, pt is AA0x3 and no signs of acute distress
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
## 2018-02-27 NOTE — ED BEHAVIORAL HEALTH ASSESSMENT NOTE - DESCRIPTION
Calm and cooperative; Bright affect. no prn medications. asthma; Cerebral Palsy Lives in [facility] group home
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
## 2018-02-27 NOTE — ED PROVIDER NOTE - MEDICAL DECISION MAKING DETAILS
Patient with austism, cognitive impairment presents with visual/auditory hallucinations; patient alwaiting tele-psych evaluation/medical clearance Patient with austism, cognitive impairment presents with visual/auditory hallucinations; patient awaiting tele-psych evaluation
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
## 2018-02-27 NOTE — ED PROVIDER NOTE - CONSTITUTIONAL, MLM
normal... Well appearing, well nourished, awake, alert, oriented to person, place, time/situation and in no apparent distress; animated

## 2018-02-27 NOTE — ED ADULT NURSE REASSESSMENT NOTE - NS ED NURSE REASSESS COMMENT FT1
Patient awake and alert, no signs of acute distress, VS noted and stable, bloodwork and urine collected, will continue to monitor and provide care as needed

## 2018-02-27 NOTE — ED PROVIDER NOTE - PROGRESS NOTE DETAILS
Hai: Patient signed out by Dr. Mirza pending TP eval. Patient evaluated and cleared by telepsych. Patient currently in good condition and now discharged with care instructions. Patient is to follow up with pmd. Discussed results and outcome of testing with the patient.  Patient advised to please follow up with their primary care doctor within the next 24 hours and return to the Emergency Department for worsening symptoms or any other concerns.  Patient advised that their doctor may call  to follow up on the specific results of the tests performed today in the emergency department.

## 2018-02-28 VITALS
TEMPERATURE: 98 F | HEART RATE: 90 BPM | DIASTOLIC BLOOD PRESSURE: 79 MMHG | OXYGEN SATURATION: 100 % | SYSTOLIC BLOOD PRESSURE: 133 MMHG | RESPIRATION RATE: 19 BRPM

## 2018-03-22 ENCOUNTER — EMERGENCY (EMERGENCY)
Facility: HOSPITAL | Age: 23
LOS: 0 days | Discharge: NURSING, REHAB OR LONG-TERM CARE | End: 2018-03-23
Attending: EMERGENCY MEDICINE
Payer: MEDICAID

## 2018-03-22 VITALS
RESPIRATION RATE: 20 BRPM | SYSTOLIC BLOOD PRESSURE: 120 MMHG | TEMPERATURE: 98 F | HEART RATE: 155 BPM | HEIGHT: 65 IN | OXYGEN SATURATION: 99 % | WEIGHT: 130.07 LBS | DIASTOLIC BLOOD PRESSURE: 87 MMHG

## 2018-03-22 VITALS
HEART RATE: 110 BPM | RESPIRATION RATE: 18 BRPM | TEMPERATURE: 99 F | SYSTOLIC BLOOD PRESSURE: 145 MMHG | OXYGEN SATURATION: 100 % | DIASTOLIC BLOOD PRESSURE: 57 MMHG

## 2018-03-22 DIAGNOSIS — F79 UNSPECIFIED INTELLECTUAL DISABILITIES: ICD-10-CM

## 2018-03-22 DIAGNOSIS — J45.909 UNSPECIFIED ASTHMA, UNCOMPLICATED: ICD-10-CM

## 2018-03-22 DIAGNOSIS — F39 UNSPECIFIED MOOD [AFFECTIVE] DISORDER: ICD-10-CM

## 2018-03-22 DIAGNOSIS — R45.1 RESTLESSNESS AND AGITATION: ICD-10-CM

## 2018-03-22 DIAGNOSIS — G20 PARKINSON'S DISEASE: ICD-10-CM

## 2018-03-22 DIAGNOSIS — Z71.1 PERSON WITH FEARED HEALTH COMPLAINT IN WHOM NO DIAGNOSIS IS MADE: ICD-10-CM

## 2018-03-22 DIAGNOSIS — F84.0 AUTISTIC DISORDER: ICD-10-CM

## 2018-03-22 DIAGNOSIS — Z91.010 ALLERGY TO PEANUTS: ICD-10-CM

## 2018-03-22 LAB
ALBUMIN SERPL ELPH-MCNC: 4.1 G/DL — SIGNIFICANT CHANGE UP (ref 3.3–5)
ALP SERPL-CCNC: 52 U/L — SIGNIFICANT CHANGE UP (ref 40–120)
ALT FLD-CCNC: 89 U/L — HIGH (ref 12–78)
ANION GAP SERPL CALC-SCNC: 9 MMOL/L — SIGNIFICANT CHANGE UP (ref 5–17)
APTT BLD: 34.7 SEC — SIGNIFICANT CHANGE UP (ref 27.5–37.4)
AST SERPL-CCNC: 340 U/L — HIGH (ref 15–37)
BASOPHILS # BLD AUTO: 0.02 K/UL — SIGNIFICANT CHANGE UP (ref 0–0.2)
BASOPHILS NFR BLD AUTO: 0.4 % — SIGNIFICANT CHANGE UP (ref 0–2)
BILIRUB SERPL-MCNC: 0.4 MG/DL — SIGNIFICANT CHANGE UP (ref 0.2–1.2)
BUN SERPL-MCNC: 17 MG/DL — SIGNIFICANT CHANGE UP (ref 7–23)
CALCIUM SERPL-MCNC: 9.2 MG/DL — SIGNIFICANT CHANGE UP (ref 8.5–10.1)
CHLORIDE SERPL-SCNC: 106 MMOL/L — SIGNIFICANT CHANGE UP (ref 96–108)
CO2 SERPL-SCNC: 26 MMOL/L — SIGNIFICANT CHANGE UP (ref 22–31)
CREAT SERPL-MCNC: 0.83 MG/DL — SIGNIFICANT CHANGE UP (ref 0.5–1.3)
EOSINOPHIL # BLD AUTO: 0.05 K/UL — SIGNIFICANT CHANGE UP (ref 0–0.5)
EOSINOPHIL NFR BLD AUTO: 1 % — SIGNIFICANT CHANGE UP (ref 0–6)
ETHANOL SERPL-MCNC: <10 MG/DL — SIGNIFICANT CHANGE UP (ref 0–10)
GLUCOSE SERPL-MCNC: 81 MG/DL — SIGNIFICANT CHANGE UP (ref 70–99)
HCT VFR BLD CALC: 40.3 % — SIGNIFICANT CHANGE UP (ref 39–50)
HGB BLD-MCNC: 13.2 G/DL — SIGNIFICANT CHANGE UP (ref 13–17)
IMM GRANULOCYTES NFR BLD AUTO: 0.2 % — SIGNIFICANT CHANGE UP (ref 0–1.5)
INR BLD: 1.13 RATIO — SIGNIFICANT CHANGE UP (ref 0.88–1.16)
LYMPHOCYTES # BLD AUTO: 1.11 K/UL — SIGNIFICANT CHANGE UP (ref 1–3.3)
LYMPHOCYTES # BLD AUTO: 21.1 % — SIGNIFICANT CHANGE UP (ref 13–44)
MCHC RBC-ENTMCNC: 27.3 PG — SIGNIFICANT CHANGE UP (ref 27–34)
MCHC RBC-ENTMCNC: 32.8 GM/DL — SIGNIFICANT CHANGE UP (ref 32–36)
MCV RBC AUTO: 83.4 FL — SIGNIFICANT CHANGE UP (ref 80–100)
MONOCYTES # BLD AUTO: 0.55 K/UL — SIGNIFICANT CHANGE UP (ref 0–0.9)
MONOCYTES NFR BLD AUTO: 10.5 % — SIGNIFICANT CHANGE UP (ref 2–14)
NEUTROPHILS # BLD AUTO: 3.51 K/UL — SIGNIFICANT CHANGE UP (ref 1.8–7.4)
NEUTROPHILS NFR BLD AUTO: 66.8 % — SIGNIFICANT CHANGE UP (ref 43–77)
NRBC # BLD: 0 /100 WBCS — SIGNIFICANT CHANGE UP (ref 0–0)
PLATELET # BLD AUTO: 186 K/UL — SIGNIFICANT CHANGE UP (ref 150–400)
POTASSIUM SERPL-MCNC: 3.9 MMOL/L — SIGNIFICANT CHANGE UP (ref 3.5–5.3)
POTASSIUM SERPL-SCNC: 3.9 MMOL/L — SIGNIFICANT CHANGE UP (ref 3.5–5.3)
PROT SERPL-MCNC: 8.4 GM/DL — HIGH (ref 6–8.3)
PROTHROM AB SERPL-ACNC: 12.3 SEC — SIGNIFICANT CHANGE UP (ref 9.8–12.7)
RBC # BLD: 4.83 M/UL — SIGNIFICANT CHANGE UP (ref 4.2–5.8)
RBC # FLD: 13.6 % — SIGNIFICANT CHANGE UP (ref 10.3–14.5)
SODIUM SERPL-SCNC: 141 MMOL/L — SIGNIFICANT CHANGE UP (ref 135–145)
WBC # BLD: 5.25 K/UL — SIGNIFICANT CHANGE UP (ref 3.8–10.5)
WBC # FLD AUTO: 5.25 K/UL — SIGNIFICANT CHANGE UP (ref 3.8–10.5)

## 2018-03-22 PROCEDURE — 90792 PSYCH DIAG EVAL W/MED SRVCS: CPT | Mod: GT

## 2018-03-22 PROCEDURE — 99284 EMERGENCY DEPT VISIT MOD MDM: CPT

## 2018-03-22 RX ORDER — SODIUM CHLORIDE 9 MG/ML
1000 INJECTION INTRAMUSCULAR; INTRAVENOUS; SUBCUTANEOUS ONCE
Qty: 0 | Refills: 0 | Status: COMPLETED | OUTPATIENT
Start: 2018-03-22 | End: 2018-03-22

## 2018-03-22 RX ADMIN — SODIUM CHLORIDE 2000 MILLILITER(S): 9 INJECTION INTRAMUSCULAR; INTRAVENOUS; SUBCUTANEOUS at 19:18
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
## 2018-03-22 NOTE — ED PROVIDER NOTE - OBJECTIVE STATEMENT
21 yo male with autism, cognitive impairment presents from group residence with agitation. Patient denies any physical complaints, abdominal pain, fever, chills chest pain. As per staff, patient has history of agitation when another resident is sent to hospital. Patient states he wants to "speak to psychiatrist".
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
## 2018-03-22 NOTE — ED ADULT TRIAGE NOTE - NS_BH TRG QUESTION6_ED_ALL_ED
After Visit Summary   6/22/2017    Jessica Carmona    MRN: [id number]           Patient Information     Date Of Birth          1998        Visit Information        Provider Department      6/22/2017 9:00 AM Asuncion Moore NP [facility]        Today's Diagnoses     Screening examination for venereal disease    -  1    Oral contraceptive pill surveillance          Care Instructions    Let me know how the new medication works for you.. We can also change again if needed.            Follow-ups after your visit        Follow-up notes from your care team     Return if symptoms worsen or fail to improve.      Who to contact     Normal or non-critical lab and imaging results will be communicated to you by MyChart, letter or phone within 4 business days after the clinic has received the results. If you do not hear from us within 7 days, please contact the clinic through MyChart or phone. If you have a critical or abnormal lab result, we will notify you by phone as soon as possible.  Submit refill requests through [employer] or call your pharmacy and they will forward the refill request to us. Please allow 3 business days for your refill to be completed.          If you need to speak with a  for additional information , please call: [phone]             Additional Information About Your Visit        Care EveryWhere ID     This is your Care EveryWhere ID. This could be used by other organizations to access your [facility] medical records  Opted out of Care Everywhere exchange        Your Vitals Were     Pulse Temperature Last Period Pulse Oximetry Breastfeeding? BMI (Body Mass Index)    63 97.6  F (36.4  C) (Oral) 06/20/2017 (Approximate) 100% No 29.62 kg/m2       Blood Pressure from Last 3 Encounters:   06/22/17 122/75   05/26/17 149/89   05/26/16 125/69    Weight from Last 3 Encounters:   06/22/17 200 lb 9.6 oz (91 kg) (98 %)*   05/26/17 200 lb (90.7 kg) (98 %)*   05/26/16 219  lb (99.3 kg) (99 %)*     * Growth percentiles are based on [facility] 2-20 Years data.              We Performed the Following     Chlamydia trachomatis PCR     Neisseria gonorrhoeae PCR          Today's Medication Changes          These changes are accurate as of: 6/22/17  9:16 AM.  If you have any questions, ask your nurse or doctor.               Start taking these medicines.        Dose/Directions    desogestrel-ethinyl estradiol 0.15-30 MG-MCG per tablet   Commonly known as:  APRI   Used for:  Oral contraceptive pill surveillance   Started by:  Asuncion Moore NP        Dose:  1 tablet   Take 1 tablet by mouth daily   Quantity:  84 tablet   Refills:  1         Stop taking these medicines if you haven't already. Please contact your care team if you have questions.     levonorgestrel-ethinyl estradiol 0.15-30 MG-MCG per tablet   Commonly known as:  NORDETTE   Stopped by:  Asuncion Moore NP                Where to get your medicines      These medications were sent to [facility] - [address] [address] [facility]  [address] [address] [facility] [address] [facility] [place] [address]     Phone:  [phone]     desogestrel-ethinyl estradiol 0.15-30 MG-MCG per tablet                Primary Care Provider Office Phone #    [facility] [phone]       No address on file        Equal Access to Services     GERARDO HUMPHREYS AH: Hadii ignacio ku hadasho Soomaali, waaxda luqadaha, qaybta kaalmada adeegyada, waxay eshain haywalter woods. So [facility] [phone].    ATENCIÓN: Si habla español, tiene a lora disposición servicios gratuitos de asistencia lingüística. Llame al [phone].    We comply with applicable federal civil rights laws and [place] laws. We do not discriminate on the basis of race, color, national origin, age, disability sex, sexual orientation or gender identity.            Thank you!     Thank you for choosing [facility]  for your care. Our goal is always to provide you with excellent  care. Hearing back from our patients is one way we can continue to improve our services. Please take a few minutes to complete the written survey that you may receive in the mail after your visit with us. Thank you!             Your Updated Medication List - Protect others around you: Learn how to safely use, store and throw away your medicines at www.disposemymeds.org.          This list is accurate as of: 6/22/17  9:16 AM.  Always use your most recent med list.                   Brand Name Dispense Instructions for use Diagnosis    desogestrel-ethinyl estradiol 0.15-30 MG-MCG per tablet    APRI    84 tablet    Take 1 tablet by mouth daily    Oral contraceptive pill surveillance          No
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
## 2018-03-22 NOTE — ED PROVIDER NOTE - PROGRESS NOTE DETAILS
patient accepted as sign-out from Dr. Fuentes. awaiting tele-psych consult telepsych states patient may be discharged.
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
## 2018-12-21 ENCOUNTER — EMERGENCY (EMERGENCY)
Facility: HOSPITAL | Age: 23
LOS: 1 days | Discharge: HOME | End: 2018-12-21
Admitting: EMERGENCY MEDICINE
Payer: MEDICAID

## 2018-12-21 VITALS
RESPIRATION RATE: 18 BRPM | TEMPERATURE: 99 F | OXYGEN SATURATION: 100 % | HEART RATE: 98 BPM | SYSTOLIC BLOOD PRESSURE: 146 MMHG | DIASTOLIC BLOOD PRESSURE: 88 MMHG

## 2018-12-21 PROCEDURE — 99283 EMERGENCY DEPT VISIT LOW MDM: CPT

## 2018-12-21 RX ORDER — FAMOTIDINE 10 MG/ML
20 INJECTION INTRAVENOUS ONCE
Qty: 0 | Refills: 0 | Status: COMPLETED | OUTPATIENT
Start: 2018-12-21 | End: 2018-12-21

## 2018-12-21 RX ORDER — CHLORPROMAZINE HCL 10 MG
50 TABLET ORAL ONCE
Qty: 0 | Refills: 0 | Status: COMPLETED | OUTPATIENT
Start: 2018-12-21 | End: 2018-12-21

## 2018-12-21 RX ADMIN — Medication 30 MILLILITER(S): at 20:03

## 2018-12-21 RX ADMIN — Medication 50 MILLIGRAM(S): at 19:16

## 2018-12-21 RX ADMIN — FAMOTIDINE 20 MILLIGRAM(S): 10 INJECTION INTRAVENOUS at 20:03

## 2018-12-21 NOTE — ED ADULT NURSE NOTE - OBJECTIVE STATEMENT
Received pt in  pt c/o abdominal discomfort pt  denies Si/Hi/AVh presently safety & comfort measures maintained eval on going.
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
## 2018-12-21 NOTE — ED ADULT NURSE NOTE - NSIMPLEMENTINTERV_GEN_ALL_ED
Implemented All Universal Safety Interventions:  [place] to call system. Call bell, personal items and telephone within reach. Instruct patient to call for assistance. Room bathroom lighting operational. Non-slip footwear when patient is off stretcher. Physically safe environment: no spills, clutter or unnecessary equipment. Stretcher in lowest position, wheels locked, appropriate side rails in place.

## 2018-12-21 NOTE — ED ADULT NURSE NOTE - NS ED NOTE ABUSE SUSPICION NEGLECT YN
----- Message from Joseph Savage MD sent at 4/18/2017  2:02 PM CDT -----  Review of pathology report from colonoscopy dated 10 April 17:  SPECIMEN  1) Polyp at 35 cm by hot snare.  2) Cecal polyp by hot snare.  FINAL PATHOLOGIC DIAGNOSIS  1. 35 cm polyp:  -Benign polyploid colonic mucosa  -Small lymphoid aggregate  -Negative for dysplasia or malignancy  2. Cecal polyp:  -Adenomatous polyp  -See note  Note: 2. Cross sections of adenoma are represented. High-grade dysplasia is not identified.    IMP:- benign colon polyps removed    REC:- F/U colonoscopy 3yr    PCP: MD Joseph Brock MD, FACP, FACG, AGAF [facility]  [address], [address], [place] [address]  Phone: [phone] Fax: [phone]    [address], [address], [place] [address]  Phone: [phone] Fax: [phone]    - Portions of this note were dictated using voice recognition software and may contain dictation related errors in spelling / grammar / syntax not discovered on text review.    
Left patient a message to call the office back in regards to his Pathology Report results.   
No

## 2018-12-21 NOTE — ED PROVIDER NOTE - MEDICAL DECISION MAKING DETAILS
21 y/o M hx Autism, MR, Mood Disorder   Medical evaluation performed. There is no clinical evidence of intoxication or any acute medical problem requiring immediate intervention. Medication offered. D/C  to group home via EMS, accompanied by staff.

## 2018-12-21 NOTE — ED PROVIDER NOTE - OBJECTIVE STATEMENT
21 y/o M hx Autism, MR, Mood Disorder BIBA from group home w c/o agitation and acting out behaviour. Staff states that patient began yelling and talking excessively. Patient states that he was upset because his stomach was hurting shortly after finishing his meal. States ' I want to go home".  Denies falling ,punching or kicking any objects. Denies SOB, fever, chills , chest  discomfort. Denies SI/HI/AH/VH. Denies use of alcohol or illicit drugs.

## 2019-01-14 ENCOUNTER — EMERGENCY (EMERGENCY)
Facility: HOSPITAL | Age: 24
LOS: 1 days | Discharge: HOME | End: 2019-01-14
Admitting: EMERGENCY MEDICINE
Payer: MEDICAID

## 2019-01-14 VITALS
OXYGEN SATURATION: 100 % | DIASTOLIC BLOOD PRESSURE: 87 MMHG | SYSTOLIC BLOOD PRESSURE: 159 MMHG | HEART RATE: 102 BPM | TEMPERATURE: 99 F | RESPIRATION RATE: 18 BRPM

## 2019-01-14 PROCEDURE — 99283 EMERGENCY DEPT VISIT LOW MDM: CPT
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
## 2019-01-14 NOTE — ED PROVIDER NOTE - MEDICAL DECISION MAKING DETAILS
24 y/o male with right cheek skin irritation  -less likely ecchymosis, possibel abrasion vs eczema  -local skin care  -outpt pmd followup

## 2019-01-14 NOTE — ED PROVIDER NOTE - SKIN RASH DESCRIPTION
right cheek: + hyperpigmentation and skin thickening/dryness noted. no underlying ecchymosis noted. non tender to palpation.

## 2019-01-16 ENCOUNTER — EMERGENCY (EMERGENCY)
Facility: HOSPITAL | Age: 24
LOS: 1 days | Discharge: HOME | End: 2019-01-16
Attending: EMERGENCY MEDICINE | Admitting: EMERGENCY MEDICINE
Payer: MEDICAID

## 2019-01-16 VITALS
DIASTOLIC BLOOD PRESSURE: 83 MMHG | HEART RATE: 107 BPM | RESPIRATION RATE: 16 BRPM | TEMPERATURE: 99 F | SYSTOLIC BLOOD PRESSURE: 133 MMHG | OXYGEN SATURATION: 100 %

## 2019-01-16 PROCEDURE — 73130 X-RAY EXAM OF HAND: CPT | Mod: 26,RT

## 2019-01-16 PROCEDURE — 99283 EMERGENCY DEPT VISIT LOW MDM: CPT

## 2019-01-16 RX ORDER — TETANUS TOXOID, REDUCED DIPHTHERIA TOXOID AND ACELLULAR PERTUSSIS VACCINE, ADSORBED 5; 2.5; 8; 8; 2.5 [IU]/.5ML; [IU]/.5ML; UG/.5ML; UG/.5ML; UG/.5ML
0.5 SUSPENSION INTRAMUSCULAR ONCE
Qty: 0 | Refills: 0 | Status: COMPLETED | OUTPATIENT
Start: 2019-01-16 | End: 2019-01-16

## 2019-01-16 RX ORDER — IBUPROFEN 200 MG
600 TABLET ORAL ONCE
Qty: 0 | Refills: 0 | Status: COMPLETED | OUTPATIENT
Start: 2019-01-16 | End: 2019-01-16

## 2019-01-16 RX ADMIN — Medication 600 MILLIGRAM(S): at 18:33

## 2019-01-16 RX ADMIN — TETANUS TOXOID, REDUCED DIPHTHERIA TOXOID AND ACELLULAR PERTUSSIS VACCINE, ADSORBED 0.5 MILLILITER(S): 5; 2.5; 8; 8; 2.5 SUSPENSION INTRAMUSCULAR at 18:33
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
## 2019-01-16 NOTE — ED PROVIDER NOTE - OBJECTIVE STATEMENT
24 y/o male with a PMHx asthma, autism, mental retardation , mood disorder, and Parkinson disease of presents to the ED with c/o a laceration. Pt states that he punched a glass window today and cut his right hand. Of note pt is unsure about last tetanus shot. 24 y/o male with a PMHx asthma, autism, mental retardation , mood disorder, and Parkinson disease of presents to the ED with c/o hand pain.  Pt states that he punched a glass window today and cut his right hand. Of note pt is unsure about last tetanus shot.

## 2019-01-16 NOTE — ED PROVIDER NOTE - MEDICAL DECISION MAKING DETAILS
22 y/o male with R hand pain and superficial abrasion after punching a window unsure of last tetanus shot.. Will update tetanus shot., ibuprofen, and x-ray  to r/o bony injury. Expect d/c with PCP follow up.

## 2019-01-16 NOTE — ED PROVIDER NOTE - PHYSICAL EXAMINATION
Right hand no swelling or deformities. No activate bleeding not grossly contaminated FROM of the MCP joint. Distal and neurovascular intact.

## 2019-01-16 NOTE — ED PROVIDER NOTE - NSFOLLOWUPINSTRUCTIONS_ED_ALL_ED_FT
Rest, drink plenty of fluids.  Advance activity as tolerated. Take Motrin 600 mg every 8 hours as needed for moderate pain -- take with food.   Follow up with your primary care physician in 48-72 hours- bring copies of your results.  Return to the ER for worsening or persistent symptoms, and/or ANY NEW OR CONCERNING SYMPTOMS. If you have issues obtaining follow up, please call: [phone] to obtain a doctor or specialist who takes your insurance in your area.

## 2019-01-16 NOTE — ED PROVIDER NOTE - MUSCULOSKELETAL, MLM
Right hand no swelling or deformities. No activate bleeding not grossly contaminated FROM of the MCP joint. Distal and neurovascular intact. +2mm superficial abrasion at the 2nd MCP joint at dorsal aspect and Mild tenderness to palpation. Right hand no swelling or deformities. No active bleeding not grossly contaminated FROM of the MCP joint. Distal neurovascular intact. +2mm superficial abrasion at the 2nd MCP joint at dorsal aspect and Mild tenderness to palpation.

## 2019-01-16 NOTE — ED PROVIDER NOTE - PROGRESS NOTE DETAILS
STANTON SANFORD: received pt s/p Quids eval by Dr. Payton to f/u xray. Xray hand neg for acute fx. Discussed with attending, patient can be discharged home to f/u with PCP. The patient was given verbal and written discharge instructions. Specifically, instructions when to return to the ED and when to seek follow-up from their pcp was discussed. Any specialty follow-up was discussed, including how to make an appointment.  Instructions were discussed in simple, plain language and was understood by the patient. The patient understands that should their symptoms worsen or any new symptoms arise, they should return to the ED immediately for further evaluation. All pt's questions were answered. Patient verbalizes understanding. STANTON SANFORD: received pt s/p Quids eval by Dr. Payton to f/u xray. Xray hand neg for acute fx. Discussed with attending, patient can be discharged home to f/u with PCP. The patient was given verbal and written discharge instructions. Specifically, instructions when to return to the ED and when to seek follow-up from their pcp was discussed. Any specialty follow-up was discussed, including how to make an appointment.  Instructions were discussed in simple, plain language and was understood by the patient. The patient understands that should their symptoms worsen or any new symptoms arise, they should return to the ED immediately for further evaluation. All pt's questions were answered. Patient verbalizes understanding.  Discharge papers given to two staff member who will transport patient back to group home. all questions have been answered.

## 2019-02-09 PROBLEM — Z00.00 ENCOUNTER FOR PREVENTIVE HEALTH EXAMINATION: Noted: 2019-02-09

## 2019-02-12 ENCOUNTER — OUTPATIENT (OUTPATIENT)
Dept: OUTPATIENT SERVICES | Facility: HOSPITAL | Age: 24
LOS: 1 days | Discharge: HOME | End: 2019-02-12

## 2019-02-12 DIAGNOSIS — D72.819 DECREASED WHITE BLOOD CELL COUNT, UNSPECIFIED: ICD-10-CM

## 2019-02-21 ENCOUNTER — EMERGENCY (EMERGENCY)
Facility: HOSPITAL | Age: 24
LOS: 1 days | Discharge: HOME | End: 2019-02-21
Attending: EMERGENCY MEDICINE | Admitting: EMERGENCY MEDICINE
Payer: MEDICAID

## 2019-02-21 ENCOUNTER — APPOINTMENT (OUTPATIENT)
Dept: HEMATOLOGY ONCOLOGY | Facility: CLINIC | Age: 24
End: 2019-02-21

## 2019-02-21 VITALS
TEMPERATURE: 98 F | SYSTOLIC BLOOD PRESSURE: 128 MMHG | RESPIRATION RATE: 17 BRPM | HEART RATE: 91 BPM | DIASTOLIC BLOOD PRESSURE: 60 MMHG | OXYGEN SATURATION: 100 %

## 2019-02-21 VITALS
HEART RATE: 113 BPM | TEMPERATURE: 98 F | DIASTOLIC BLOOD PRESSURE: 73 MMHG | RESPIRATION RATE: 20 BRPM | SYSTOLIC BLOOD PRESSURE: 149 MMHG | OXYGEN SATURATION: 100 %

## 2019-02-21 VITALS
TEMPERATURE: 98.7 F | SYSTOLIC BLOOD PRESSURE: 127 MMHG | RESPIRATION RATE: 16 BRPM | BODY MASS INDEX: 18.67 KG/M2 | WEIGHT: 126.08 LBS | OXYGEN SATURATION: 99 % | HEART RATE: 88 BPM | HEIGHT: 69 IN | DIASTOLIC BLOOD PRESSURE: 74 MMHG

## 2019-02-21 DIAGNOSIS — D70.9 NEUTROPENIA, UNSPECIFIED: ICD-10-CM

## 2019-02-21 LAB
ALBUMIN SERPL ELPH-MCNC: 4.7 G/DL — SIGNIFICANT CHANGE UP (ref 3.3–5)
ALP SERPL-CCNC: 44 U/L — SIGNIFICANT CHANGE UP (ref 40–120)
ALT FLD-CCNC: 13 U/L — SIGNIFICANT CHANGE UP (ref 4–41)
ANION GAP SERPL CALC-SCNC: 13 MMO/L — SIGNIFICANT CHANGE UP (ref 7–14)
AST SERPL-CCNC: 23 U/L — SIGNIFICANT CHANGE UP (ref 4–40)
BASE EXCESS BLDV CALC-SCNC: 1.3 MMOL/L — SIGNIFICANT CHANGE UP
BASOPHILS # BLD AUTO: 0.02 K/UL — SIGNIFICANT CHANGE UP (ref 0–0.2)
BASOPHILS NFR BLD AUTO: 0.4 % — SIGNIFICANT CHANGE UP (ref 0–2)
BILIRUB SERPL-MCNC: 0.6 MG/DL — SIGNIFICANT CHANGE UP (ref 0.2–1.2)
BLOOD GAS VENOUS - CREATININE: 0.6 MG/DL — SIGNIFICANT CHANGE UP (ref 0.5–1.3)
BUN SERPL-MCNC: 11 MG/DL — SIGNIFICANT CHANGE UP (ref 7–23)
CALCIUM SERPL-MCNC: 10 MG/DL — SIGNIFICANT CHANGE UP (ref 8.4–10.5)
CHLORIDE BLDV-SCNC: 107 MMOL/L — SIGNIFICANT CHANGE UP (ref 96–108)
CHLORIDE SERPL-SCNC: 103 MMOL/L — SIGNIFICANT CHANGE UP (ref 98–107)
CO2 SERPL-SCNC: 26 MMOL/L — SIGNIFICANT CHANGE UP (ref 22–31)
CREAT SERPL-MCNC: 0.92 MG/DL — SIGNIFICANT CHANGE UP (ref 0.5–1.3)
EOSINOPHIL # BLD AUTO: 0.02 K/UL — SIGNIFICANT CHANGE UP (ref 0–0.5)
EOSINOPHIL NFR BLD AUTO: 0.4 % — SIGNIFICANT CHANGE UP (ref 0–6)
GAS PNL BLDV: 140 MMOL/L — SIGNIFICANT CHANGE UP (ref 136–146)
GLUCOSE BLDV-MCNC: 76 — SIGNIFICANT CHANGE UP (ref 70–99)
GLUCOSE SERPL-MCNC: 88 MG/DL — SIGNIFICANT CHANGE UP (ref 70–99)
HCO3 BLDV-SCNC: 24 MMOL/L — SIGNIFICANT CHANGE UP (ref 20–27)
HCT VFR BLD CALC: 41.2 % — SIGNIFICANT CHANGE UP (ref 39–50)
HCT VFR BLDV CALC: 41.7 % — SIGNIFICANT CHANGE UP (ref 39–51)
HGB BLD-MCNC: 13.1 G/DL — SIGNIFICANT CHANGE UP (ref 13–17)
HGB BLDV-MCNC: 13.6 G/DL — SIGNIFICANT CHANGE UP (ref 13–17)
IMM GRANULOCYTES NFR BLD AUTO: 0.2 % — SIGNIFICANT CHANGE UP (ref 0–1.5)
LACTATE BLDV-MCNC: 2.2 MMOL/L — HIGH (ref 0.5–2)
LIDOCAIN IGE QN: 12.4 U/L — SIGNIFICANT CHANGE UP (ref 7–60)
LYMPHOCYTES # BLD AUTO: 1.4 K/UL — SIGNIFICANT CHANGE UP (ref 1–3.3)
LYMPHOCYTES # BLD AUTO: 27.9 % — SIGNIFICANT CHANGE UP (ref 13–44)
MCHC RBC-ENTMCNC: 27.8 PG — SIGNIFICANT CHANGE UP (ref 27–34)
MCHC RBC-ENTMCNC: 31.8 % — LOW (ref 32–36)
MCV RBC AUTO: 87.5 FL — SIGNIFICANT CHANGE UP (ref 80–100)
MONOCYTES # BLD AUTO: 0.59 K/UL — SIGNIFICANT CHANGE UP (ref 0–0.9)
MONOCYTES NFR BLD AUTO: 11.8 % — SIGNIFICANT CHANGE UP (ref 2–14)
NEUTROPHILS # BLD AUTO: 2.97 K/UL — SIGNIFICANT CHANGE UP (ref 1.8–7.4)
NEUTROPHILS NFR BLD AUTO: 59.3 % — SIGNIFICANT CHANGE UP (ref 43–77)
NRBC # FLD: 0 K/UL — LOW (ref 25–125)
PCO2 BLDV: 45 MMHG — SIGNIFICANT CHANGE UP (ref 41–51)
PH BLDV: 7.38 PH — SIGNIFICANT CHANGE UP (ref 7.32–7.43)
PLATELET # BLD AUTO: 159 K/UL — SIGNIFICANT CHANGE UP (ref 150–400)
PMV BLD: 11.6 FL — SIGNIFICANT CHANGE UP (ref 7–13)
PO2 BLDV: 29 MMHG — LOW (ref 35–40)
POTASSIUM BLDV-SCNC: 4.4 MMOL/L — SIGNIFICANT CHANGE UP (ref 3.4–4.5)
POTASSIUM SERPL-MCNC: 4.6 MMOL/L — SIGNIFICANT CHANGE UP (ref 3.5–5.3)
POTASSIUM SERPL-SCNC: 4.6 MMOL/L — SIGNIFICANT CHANGE UP (ref 3.5–5.3)
PROT SERPL-MCNC: 8.4 G/DL — HIGH (ref 6–8.3)
RBC # BLD: 4.71 M/UL — SIGNIFICANT CHANGE UP (ref 4.2–5.8)
RBC # FLD: 13.2 % — SIGNIFICANT CHANGE UP (ref 10.3–14.5)
SAO2 % BLDV: 50.3 % — LOW (ref 60–85)
SODIUM SERPL-SCNC: 142 MMOL/L — SIGNIFICANT CHANGE UP (ref 135–145)
VALPROATE SERPL-MCNC: 68.4 UG/ML — SIGNIFICANT CHANGE UP (ref 50–100)
WBC # BLD: 5.01 K/UL — SIGNIFICANT CHANGE UP (ref 3.8–10.5)
WBC # FLD AUTO: 5.01 K/UL — SIGNIFICANT CHANGE UP (ref 3.8–10.5)

## 2019-02-21 PROCEDURE — 71045 X-RAY EXAM CHEST 1 VIEW: CPT | Mod: 26

## 2019-02-21 PROCEDURE — 99284 EMERGENCY DEPT VISIT MOD MDM: CPT

## 2019-02-21 RX ORDER — ONDANSETRON 8 MG/1
4 TABLET, FILM COATED ORAL ONCE
Qty: 0 | Refills: 0 | Status: COMPLETED | OUTPATIENT
Start: 2019-02-21 | End: 2019-02-21

## 2019-02-21 RX ORDER — SODIUM CHLORIDE 9 MG/ML
1000 INJECTION INTRAMUSCULAR; INTRAVENOUS; SUBCUTANEOUS ONCE
Qty: 0 | Refills: 0 | Status: COMPLETED | OUTPATIENT
Start: 2019-02-21 | End: 2019-02-21

## 2019-02-21 RX ADMIN — SODIUM CHLORIDE 1000 MILLILITER(S): 9 INJECTION INTRAMUSCULAR; INTRAVENOUS; SUBCUTANEOUS at 16:55

## 2019-02-21 RX ADMIN — ONDANSETRON 4 MILLIGRAM(S): 8 TABLET, FILM COATED ORAL at 16:55

## 2019-02-21 NOTE — ED PROVIDER NOTE - ATTENDING CONTRIBUTION TO CARE
Samanta: 22yo male with a h/o MR, autism, and asthma sent in from outpatient hematology office for a CBC. Pt was referred to hematology by psychiatry for a transient low WBC count. Pt was seen today in the office but they were unable to draw labs secondary to agitation so sent him to the ED. Pt also c/o nasal congestion and nausea but no vomiting, diarrhea, abdominal pain, cough or SOB. No fevers or chills. Exam: GENERAL: well appearing, NAD, HEENT: dry mucus membranes, no scleral icterus, CARDIO: + regular tachycardia,  LUNGS: CTA B/L, no wheezing, rales or rhonchi, ABD: soft, nontender, BSx4 quadrants, no guarding or rigidity. EXT: No LE edema NEURO: AxOx3, easily excitable but cooperative and redirectable.  SKIN: no rashes or lesions, well perfused A/P- 22 yo male well appearing sent in for CBC. Pt also has nausea and nasal congestion. Pt appears dehydrated and is mildly tachycardic. will obtain cbc, cmp, vbg, depakote level, and CXR. will treat symptomatically wth antiemetics, IVF and Po trial.

## 2019-02-21 NOTE — ED PROVIDER NOTE - NS ED ROS FT
CONSTITUTIONAL: No fevers, no chills  Cardiovascular: No Chest pain  Respiratory: No SOB  Gastrointestinal: No n/v/d, no abd pain  Genitourinary: no dysuria, no hematuria  NEURO: no headache, no weakness or numbness  PSYCHIATRIC: refer to HPI

## 2019-02-21 NOTE — ED ADULT NURSE NOTE - OBJECTIVE STATEMENT
A&Ox3 pt brought from [facility] for possible for low WBCs, pt c/o stuffy nose, nausea abdominal pain pt hx of MR, autism, pt has uncontrolled movements but is able to answer questions and is aware of his surroundings, denies cp sob fever chills

## 2019-02-21 NOTE — ED PROVIDER NOTE - PHYSICAL EXAMINATION
General: well appearing, flapping hands throughout interview, well nourished, NAD  HEENT: pupils equal and reactive, normal mucosa, normal oropharynx, no lesions on the lips or on oral mucosa, normal external ears bilaterally   Neck: supple, no lymphadeopathy, full range of motion, no nuchal rigidity  CV: regular rhythm, normal S1 and S2 with no murmur  Resp: lungs clear to auscultation bilaterally, symmetric chest wall rise  Abd: soft, nontender, nondistended, normoactive bowel sounds  : no CVA tenderness  Neuro: Moving all extremities  Skin: no rashes, skin intact

## 2019-02-21 NOTE — ED PROVIDER NOTE - NSFOLLOWUPINSTRUCTIONS_ED_ALL_ED_FT
(1) Follow up with your primary care physician as discussed  (2) Immediately seek care at your nearest emergency room if your worsen, persist, or do not resolve   (3) Take the prescribed medication as instructed

## 2019-02-21 NOTE — ED ADULT NURSE NOTE - CHIEF COMPLAINT QUOTE
pt brought from [facility] for low WBCs, unknown what treatment pt is receiving @ [facility], as per EMS pt stated was feeling dizzy en route, on presentation pt c/o abdm pain and nausea, agitated on presentation

## 2019-02-21 NOTE — ED PROVIDER NOTE - OBJECTIVE STATEMENT
24yo M pmhx of autism, MR, explosive mood disorder, and Parkinson disease p/w cc of agitation    Pt was reffered to [facility] due to chronically low WBC counts, while at [facility] hematologist states that pt was "too agitated" to draw blood and pt was sent to ER for psych eval. dR Nakul Hudson, oncologist at [facility], states that no further workup for chronically low WBC is needed and pt only needs psych eval. Pt denies having had fevers/chills. Denies coughing/burning on urination. 22yo M pmhx of autism, MR, explosive mood disorder, and Parkinson disease p/w cc of agitation    Pt was reffered to [facility] due to chronically low WBC counts, while at [facility] hematologist states that pt was "too agitated" to draw blood and pt was sent to ER for psych eval. dR Nakul Hudson, oncologist at [facility], states that no further workup for chronically low WBC is needed and pt only needs psych eval. Pt denies having had fevers/chills. Denies coughing/burning on urination.    Heidy Mendiola [phone]  Cindy teran [phone]

## 2019-02-21 NOTE — ED PROVIDER NOTE - CLINICAL SUMMARY MEDICAL DECISION MAKING FREE TEXT BOX
24yo M pmhx of autism, MR, explosive mood disorder, and Parkinson disease p/w cc of agitation. Spoke w/ oncology states does need any more oncologlic workup, cleared from psych standpoint, will d/c

## 2019-02-21 NOTE — ED ADULT TRIAGE NOTE - CHIEF COMPLAINT QUOTE
pt brought from [facility] for low WBCs, unknown what treatment pt is receiving @ [facility], as per EMS pt stated was feeling dizzy en route, on presentation pt c/o abdm pain and nausea, agitated on presentation

## 2019-02-22 NOTE — HISTORY OF PRESENT ILLNESS
[de-identified] : 22 yo AA M with cerebral palsy, autism spectrum disorder, impulse control and explosive disorder, lives in a group home, referred by his Psychiatrist for evaluation of neutropenia. \par \par Difficult to obtain any history from the patient and aide accompanying pt only has a list of pt's medications and not sure about pt's medical history. Called pt's Psychiatrist (Dr. Darby Hudson) to obtain collateral information. She started seeing the pt about 2-3 months ago and noted that his absolute neutrophil count is 1218 (9/2018), then 840 (12/2018) and most recently 917 (1/2019). As far as she knows, pt has not been on clozapine. His current medications include: Depakote, Benztropine, Chlorpromazine, and Risperdal. Don't know if any of the the above medications are new for the pt. His previous WBC trend in our system shows baseline WBC of ~ 5.0 and intermittent transient neutropenia which does recover. No history of recurrent infections no current signs or symptoms of infections. Family history unknown. \par \par Psychiatrist: Dr. Darby Hudson ([phone])\par Group Home: Katrin Flores (RN) [phone] and Ms. Mendiola (Supervisor) [phone]

## 2019-02-22 NOTE — PHYSICAL EXAM
[Normal] : normal appearance, no rash, nodules, vesicles, ulcers, erythema [de-identified] : Thin, young, AA male, easily excitable and anxious [de-identified] : tachycardic, S1/S2 [de-identified] : no LE edema [de-identified] : unable to cooperate [de-identified] : anxious and very hyperactive but re-directable

## 2019-02-22 NOTE — ASSESSMENT
[FreeTextEntry1] : 22 yo AA M with cerebral palsy, autism spectrum disorder, impulse control and explosive disorder, lives in a group home, referred by his Psychiatrist for evaluation of neutropenia. \par \par 1. Isolated neutropenia:\par - pt has intermittent and transient leukopenia and neutropenia likely related to his psychotropic medications (chlorpromazine, risperdal, and depakote) and ? component of benign ethnic neutropenia.\par - pt with no history of infections, risk of infection increases when ANC < 1000\par - Pt sent to the lab for blood work which he was agreeable to, however, upon arriving to the lab, pt got very anxious, reported that he wasn't feeling well and repeatedly requesting to go to the hospital. His aide who was accompanying the pt attempted to calm him down, however pt getting more and more agitated. Brought pt back to the exam room and asked if he would allow for fingerstick for CBC, however, pt refusing and again asking to go to the hospital. Aide also requesting for pt to be brought to the hospital, EMS called and pt sent to [facility]. Spoke with ER provider at [facility] and asked for psychiatry to evaluate the pt. Pt allowed for blood work at the hospital and CBC showed WBC of 5.01 and a normal ANC of 2970. \par - Discussed with pt's Psychiatrist (Dr. Darby Hudson) over the phone, recommended to monitor weekly CBCs when initiating any psychotropic medications known to cause neutropenia and agranulocytosis and to hold medication if ANC < 1000, per guidelines if pt to start clozapine, should have ANC > 1500 as well. Pt to follow up with Psychiatry and does not need continued Hematology follow up at this time. \par \par discussed with Dr. Herron

## 2019-02-22 NOTE — REASON FOR VISIT
[Initial Consultation] : an initial consultation for [Other: _____] : [unfilled] [FreeTextEntry2] : Neutropenia

## 2019-02-22 NOTE — REVIEW OF SYSTEMS
[Fever] : no fever [Chills] : no chills [Chest Pain] : no chest pain [Shortness Of Breath] : no shortness of breath [Wheezing] : no wheezing [Cough] : no cough [Abdominal Pain] : no abdominal pain [Vomiting] : no vomiting [Diarrhea] : no diarrhea [Dysuria] : no dysuria [Easy Bleeding] : no tendency for easy bleeding [Easy Bruising] : no tendency for easy bruising [de-identified] : agitated, easily excitable

## 2019-04-29 ENCOUNTER — OUTPATIENT (OUTPATIENT)
Dept: OUTPATIENT SERVICES | Facility: HOSPITAL | Age: 24
LOS: 1 days | Discharge: HOME | End: 2019-04-29

## 2019-04-29 DIAGNOSIS — D72.819 DECREASED WHITE BLOOD CELL COUNT, UNSPECIFIED: ICD-10-CM

## 2019-05-02 ENCOUNTER — RESULT REVIEW (OUTPATIENT)
Age: 24
End: 2019-05-02

## 2019-05-02 ENCOUNTER — APPOINTMENT (OUTPATIENT)
Dept: HEMATOLOGY ONCOLOGY | Facility: CLINIC | Age: 24
End: 2019-05-02

## 2019-05-02 VITALS
OXYGEN SATURATION: 99 % | RESPIRATION RATE: 15 BRPM | DIASTOLIC BLOOD PRESSURE: 83 MMHG | TEMPERATURE: 99.1 F | HEART RATE: 90 BPM | WEIGHT: 125 LBS | SYSTOLIC BLOOD PRESSURE: 145 MMHG

## 2019-05-02 LAB
BASOPHILS # BLD AUTO: 0.1 K/UL — SIGNIFICANT CHANGE UP (ref 0–0.2)
BASOPHILS NFR BLD AUTO: 1.9 % — SIGNIFICANT CHANGE UP (ref 0–2)
EOSINOPHIL # BLD AUTO: 0 K/UL — SIGNIFICANT CHANGE UP (ref 0–0.5)
EOSINOPHIL NFR BLD AUTO: 1.3 % — SIGNIFICANT CHANGE UP (ref 0–6)
HCT VFR BLD CALC: 42.5 % — SIGNIFICANT CHANGE UP (ref 39–50)
HGB BLD-MCNC: 13.9 G/DL — SIGNIFICANT CHANGE UP (ref 13–17)
LYMPHOCYTES # BLD AUTO: 1.3 K/UL — SIGNIFICANT CHANGE UP (ref 1–3.3)
LYMPHOCYTES # BLD AUTO: 40.6 % — SIGNIFICANT CHANGE UP (ref 13–44)
MCHC RBC-ENTMCNC: 28.5 PG — SIGNIFICANT CHANGE UP (ref 27–34)
MCHC RBC-ENTMCNC: 32.6 G/DL — SIGNIFICANT CHANGE UP (ref 32–36)
MCV RBC AUTO: 87.3 FL — SIGNIFICANT CHANGE UP (ref 80–100)
MONOCYTES # BLD AUTO: 0.5 K/UL — SIGNIFICANT CHANGE UP (ref 0–0.9)
MONOCYTES NFR BLD AUTO: 15.6 % — HIGH (ref 2–14)
NEUTROPHILS # BLD AUTO: 1.3 K/UL — LOW (ref 1.8–7.4)
NEUTROPHILS NFR BLD AUTO: 40.6 % — LOW (ref 43–77)
PLATELET # BLD AUTO: 180 K/UL — SIGNIFICANT CHANGE UP (ref 150–400)
RBC # BLD: 4.87 M/UL — SIGNIFICANT CHANGE UP (ref 4.2–5.8)
RBC # FLD: 12.3 % — SIGNIFICANT CHANGE UP (ref 10.3–14.5)
WBC # BLD: 3.3 K/UL — LOW (ref 3.8–10.5)
WBC # FLD AUTO: 3.3 K/UL — LOW (ref 3.8–10.5)

## 2019-05-07 NOTE — REVIEW OF SYSTEMS
[Chills] : no chills [Fever] : no fever [Chest Pain] : no chest pain [Shortness Of Breath] : no shortness of breath [Abdominal Pain] : no abdominal pain [Wheezing] : no wheezing [Cough] : no cough [Vomiting] : no vomiting [Diarrhea] : no diarrhea [Dysuria] : no dysuria [Easy Bruising] : no tendency for easy bruising [Easy Bleeding] : no tendency for easy bleeding [de-identified] : agitated, easily excitable

## 2019-05-07 NOTE — HISTORY OF PRESENT ILLNESS
[de-identified] : 24 yo AA M with cerebral palsy, autism spectrum disorder, impulse control and explosive disorder, lives in a group home, referred by his Psychiatrist for evaluation of neutropenia. \par \par Difficult to obtain any history from the patient and aide accompanying pt only has a list of pt's medications and not sure about pt's medical history. Called pt's Psychiatrist (Dr. Darby Hudson) to obtain collateral information during initial consult in 2/2019. At that time, Dr. Hudson had started seeing pt about 2-3 months ago and noted that his absolute neutrophil count was 1218 (9/2018), then 840 (12/2018) and most recently 917 (1/2019). As far as she knows, pt has not been on clozapine. His current medications include: Depakote, Benztropine, Chlorpromazine, and Risperdal. Don't know if any of the the above medications are new for the pt. His previous WBC trend in our system shows baseline WBC of ~ 5.0 and intermittent transient neutropenia which does recover on it's own. No history of recurrent infections no current signs or symptoms of infections. Family history unknown. \par \par Psychiatrist: Dr. Darby Hudson ([phone])\par Group Home: Katrin Flores (RN) [phone], [phone] (Fax), and Ms. Mendiola (Supervisor) [phone]

## 2019-05-07 NOTE — ASSESSMENT
[FreeTextEntry1] : 22 yo AA M with cerebral palsy, autism spectrum disorder, impulse control and explosive disorder, lives in a group home, referred by his Psychiatrist for evaluation of neutropenia. \par \par 1. Isolated neutropenia: with normal counts on CBC from 2/21/19\par - CBC from 2/21/19 shows WBC of 5.01, Hgb of 13.1, Platelets of 159, and ANC of 2970. \par - pt has intermittent and transient leukopenia and neutropenia likely related to his psychotropic medications (chlorpromazine, risperdal, and depakote) and ? component of benign ethnic neutropenia.\par - CBC from today shows: WBC of 3.3 with ANC of 1300. \par - pt with no history of infections, risk of infection increases when ANC < 1000\par - pt should have CBC with Diff monitored once a month and weekly CBC with Diff when initiating any psychotropic medications known to cause neutropenia and agranulocytosis and to hold medication if ANC < 1000, per guidelines if pt to start clozapine, should have ANC > 1500 as well. \par - Pt does not need continued follow up with Hematology. \par \par At Last visit on 2/21/19:\par - Pt sent to the lab for blood work which he was agreeable to, however, upon arriving to the lab, pt got very anxious, reported that he wasn't feeling well and repeatedly requesting to go to the hospital. His aide who was accompanying the pt attempted to calm him down, however pt getting more and more agitated. Brought pt back to the exam room and asked if he would allow for fingerstick for CBC, however, pt refusing and again asking to go to the hospital. Aide also requesting for pt to be brought to the hospital, EMS called and pt sent to [facility]. Spoke with ER provider at [facility] and asked for psychiatry to evaluate the pt. Pt allowed for blood work at the hospital and CBC showed WBC of 5.01 and a normal ANC of 2970. \par - Discussed with pt's Psychiatrist (Dr. Darby Hudson) over the phone, recommended to monitor weekly CBCs when initiating any psychotropic medications known to cause neutropenia and agranulocytosis and to hold medication if ANC < 1000, per guidelines if pt to start clozapine, should have ANC > 1500 as well. Pt to follow up with Psychiatry and does not need continued Hematology follow up at this time. \par \par Discussed with Dr. Herron at last visit and discussed again today with Dr. Alanis.

## 2019-05-07 NOTE — PHYSICAL EXAM
[Normal] : normal appearance, no rash, nodules, vesicles, ulcers, erythema [de-identified] : RRR, S1/S2 [de-identified] : no LE edema [de-identified] : Thin, young, AA male, easily excitable and anxious [de-identified] : unable to cooperate [de-identified] : anxious and very hyperactive but re-directable

## 2019-06-18 ENCOUNTER — EMERGENCY (EMERGENCY)
Facility: HOSPITAL | Age: 24
LOS: 1 days | Discharge: HOME | End: 2019-06-18
Attending: EMERGENCY MEDICINE | Admitting: EMERGENCY MEDICINE
Payer: MEDICAID

## 2019-06-18 VITALS
TEMPERATURE: 98 F | OXYGEN SATURATION: 100 % | DIASTOLIC BLOOD PRESSURE: 74 MMHG | RESPIRATION RATE: 18 BRPM | HEART RATE: 110 BPM | SYSTOLIC BLOOD PRESSURE: 128 MMHG

## 2019-06-18 VITALS
SYSTOLIC BLOOD PRESSURE: 115 MMHG | HEART RATE: 91 BPM | RESPIRATION RATE: 19 BRPM | DIASTOLIC BLOOD PRESSURE: 83 MMHG | OXYGEN SATURATION: 100 %

## 2019-06-18 PROCEDURE — 73610 X-RAY EXAM OF ANKLE: CPT | Mod: 26,RT

## 2019-06-18 PROCEDURE — 99283 EMERGENCY DEPT VISIT LOW MDM: CPT

## 2019-06-18 PROCEDURE — 73630 X-RAY EXAM OF FOOT: CPT | Mod: 26,RT

## 2019-06-18 NOTE — ED PROVIDER NOTE - OBJECTIVE STATEMENT
Pertinent HPI/PMH/PSH/FHx/SHx and Review of Systems contained within  HPI:   23y M p/w CC L foot & ankle pain s/p inversion while playing this morning. was almost about to fall. c/o pain L joy of 5th metatarsal & ankle while walking. able to walk but with limps. doesn't want any analgesic. [facility] & accompanied by ESTEBAN Casillas  PMH/PSH: MR, asthma, Autism  ROS negative for: fever, Chest pain, SOB, Nausea, vomiting, diarrhea, abdominal pain, AC use, head trauma  FamilyHx and SocialHx not otherwise contributory Pertinent HPI/PMH/PSH/FHx/SHx and Review of Systems contained within  HPI:   23y M p/w CC L foot & ankle pain s/p inversion while playing this morning. was almost about to fall. c/o pain L base of 5th metatarsal & ankle while walking. able to walk but with limps. doesn't want any analgesic. pt is from [facility] & accompanied by ESTEBAN Casillas  PMH/PSH: MR, asthma, Autism  ROS negative for: fever, Chest pain, SOB, Nausea, vomiting, diarrhea, abdominal pain, AC use, head trauma  FamilyHx and SocialHx not otherwise contributory

## 2019-06-18 NOTE — ED ADULT NURSE REASSESSMENT NOTE - GENERAL PATIENT STATE
Ns infusing states he feels about the same. vs as documented/smiling/interactive/family/SO at bedside/comfortable appearance
No
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
## 2019-06-18 NOTE — ED PROVIDER NOTE - PHYSICAL EXAMINATION
*EXTREMITIES: symmetric pulses, 2+ dp & radial pulses, capillary refill < 2 seconds, no cyanosis, no edema; abrasion base of left 5th metatarsal but no bony ttp, full active & passive rom in all extremeties  *SKIN: abrasion base of left 5th metatarsal   *NEUROLOGIC: alert, CN 2-12 intact, moves all 4 extremities, full active & passive ROM in all extremities, limping due to pain

## 2019-06-18 NOTE — ED PROVIDER NOTE - CLINICAL SUMMARY MEDICAL DECISION MAKING FREE TEXT BOX
abrasion base of left 5th metatarsal but no bony ttp, full active & passive rom in all extremities. ankle & foot xr w/o any fx. will dc w/ pmd f/u.

## 2019-06-18 NOTE — ED PROVIDER NOTE - NSFOLLOWUPINSTRUCTIONS_ED_ALL_ED_FT
FOLLOW UP WITH PMD  WITHIN 2-3DAYS, CALL TO MAKE APPOINTMENT  COME BACK TO ED IF YOUR CONDITION WORSENS OR IF YOU DEVELOP FEVER GREATER THAN 100.4F, CHEST PAIN,  SHORTNESS OF BREATH OR ANY OTHER SYMPTOMS CONCERNING TO YOU  TAKE TYLENOL (ACETAMINOPHEN) 650 MG EVERY 6 HOURS AS NEEDED FOR PAIN  TAKE IBUPROFEN (MOTRIN)  600 MG EVERY 6 HOURS AS NEEDED FOR PAIN  YOUR XRAY DOESN'T SHOW ANY FRACTURE

## 2019-07-29 ENCOUNTER — EMERGENCY (EMERGENCY)
Facility: HOSPITAL | Age: 24
LOS: 1 days | Discharge: HOME | End: 2019-07-29
Attending: EMERGENCY MEDICINE | Admitting: EMERGENCY MEDICINE
Payer: MEDICAID

## 2019-07-29 VITALS
HEART RATE: 136 BPM | DIASTOLIC BLOOD PRESSURE: 80 MMHG | OXYGEN SATURATION: 99 % | SYSTOLIC BLOOD PRESSURE: 149 MMHG | TEMPERATURE: 98 F | RESPIRATION RATE: 17 BRPM

## 2019-07-29 PROCEDURE — 99283 EMERGENCY DEPT VISIT LOW MDM: CPT

## 2019-07-29 PROCEDURE — 71046 X-RAY EXAM CHEST 2 VIEWS: CPT | Mod: 26

## 2019-07-29 RX ORDER — ACETAMINOPHEN 500 MG
650 TABLET ORAL ONCE
Refills: 0 | Status: COMPLETED | OUTPATIENT
Start: 2019-07-29 | End: 2019-07-29
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
## 2019-07-29 NOTE — ED PROVIDER NOTE - CARDIAC, MLM
Normal rate, regular rhythm.  Heart sounds S1, S2.  No murmurs, rubs or gallops. tachy, regular (patient states his HR often elevated, attributes to missed BP med)
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
## 2019-10-04 ENCOUNTER — EMERGENCY (EMERGENCY)
Facility: HOSPITAL | Age: 24
LOS: 1 days | Discharge: HOME | End: 2019-10-04
Attending: EMERGENCY MEDICINE | Admitting: EMERGENCY MEDICINE
Payer: MEDICAID

## 2019-10-04 VITALS
HEART RATE: 92 BPM | OXYGEN SATURATION: 99 % | SYSTOLIC BLOOD PRESSURE: 146 MMHG | TEMPERATURE: 98 F | DIASTOLIC BLOOD PRESSURE: 59 MMHG | RESPIRATION RATE: 18 BRPM

## 2019-10-04 PROCEDURE — 71046 X-RAY EXAM CHEST 2 VIEWS: CPT | Mod: 26

## 2019-10-04 PROCEDURE — 99283 EMERGENCY DEPT VISIT LOW MDM: CPT

## 2019-10-04 RX ORDER — ACETAMINOPHEN 500 MG
650 TABLET ORAL ONCE
Refills: 0 | Status: COMPLETED | OUTPATIENT
Start: 2019-10-04 | End: 2019-10-04

## 2019-10-04 RX ADMIN — Medication 650 MILLIGRAM(S): at 19:06
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
## 2019-10-04 NOTE — ED PROVIDER NOTE - MUSCULOSKELETAL MINIMAL EXAM
+Mild midline thoracic spinal TTP. 5/5 strength in all extremities. Ambulatory. No neck or low back midline tenderness.

## 2019-10-04 NOTE — ED ADULT NURSE NOTE - CAS ELECT INFOMATION PROVIDED
report to  Maury by ESTEBAN Solomon, called to follow up and make sure there were no questions involving care received in ED by was unable to contact Ms Mendiola. () as requested by NICKOLAS Krishnan

## 2019-10-04 NOTE — ED PROVIDER NOTE - PATIENT PORTAL LINK FT
You can access the FollowMyHealth Patient Portal offered by [facility] by registering at the following website: [email or web address]. By joining "[employer]’s FollowMyHealth portal, you will also be able to view your health information using other applications (apps) compatible with our system.

## 2019-10-04 NOTE — PROVIDER CONTACT NOTE (OTHER) - ASSESSMENT
Winifred Dos Santos (DO) referred this case is from [facility]. Writer met with the [facility] staff at bedside Ms. Law  –   who informed pt resides at [facility] [id number] [facility], [facility] [id number] [phone]. Writer reached out to [facility] and spoke with  Ms. Cindi Mendiola  [phone] who informed pt has been residing with them and aware of this ED visit and    will arrange their own transportation upon discharge. Medical team was made aware of this intervention and in agreement.  Discharge huddle form was filled out and given to RN. There is  no further SW intervention needed for this visit.

## 2019-10-11 NOTE — ED PROVIDER NOTE - DISPOSITION TYPE
[FreeTextEntry1] : 1. Here for f/u HTN, anemia and xs ETOH\par 2. Referral to GYN [de-identified] : 1. HTN\par 9/18/2019 pt established care- /133-  pt sent to ER for further evaluation- Treated w Amlodipine and Lisinopril- BP lowered to 176/- discharged on Amlodipine 10 mg once daily-  advised f/u PCP\par 9/19/2019 BP 1160/95- advised to continue Amlodipine and return in one month\par Today- here for BP check -tolerating medication- no CP/palpation/dizziness- "I feel good."\par \par 2. Anemia- on iron tabs- feels more energetic- does have increase menstrual flow - last GYN?\par \par 3. XS ETOH- has attempted to cut back- now 2-3 drinks a day-  6 drinks one evening this month - \par \par 4. Had flu shot local pharm 9/18/2019 DISCHARGE

## 2019-11-23 ENCOUNTER — EMERGENCY (EMERGENCY)
Facility: HOSPITAL | Age: 24
LOS: 1 days | Discharge: HOME | End: 2019-11-23
Attending: EMERGENCY MEDICINE
Payer: SELF-PAY

## 2019-11-23 VITALS
OXYGEN SATURATION: 99 % | HEART RATE: 99 BPM | WEIGHT: 121.03 LBS | HEIGHT: 68 IN | DIASTOLIC BLOOD PRESSURE: 78 MMHG | TEMPERATURE: 98 F | SYSTOLIC BLOOD PRESSURE: 143 MMHG | RESPIRATION RATE: 16 BRPM

## 2019-11-23 PROCEDURE — 99283 EMERGENCY DEPT VISIT LOW MDM: CPT

## 2019-11-23 PROCEDURE — 71046 X-RAY EXAM CHEST 2 VIEWS: CPT

## 2019-11-23 PROCEDURE — 99283 EMERGENCY DEPT VISIT LOW MDM: CPT | Mod: 25

## 2019-11-23 PROCEDURE — 71046 X-RAY EXAM CHEST 2 VIEWS: CPT | Mod: 26

## 2019-11-23 RX ORDER — FLUTICASONE PROPIONATE 50 MCG
1 SPRAY, SUSPENSION NASAL
Qty: 1 | Refills: 0
Start: 2019-11-23 | End: 2019-11-29

## 2019-11-23 RX ORDER — CHLORPROMAZINE HCL 10 MG
50 TABLET ORAL ONCE
Refills: 0 | Status: DISCONTINUED | OUTPATIENT
Start: 2019-11-23 | End: 2019-11-23

## 2019-11-23 NOTE — ED PROVIDER NOTE - PMH
ADHD (attention deficit hyperactivity disorder)    Asthma    Autism    Cerebral palsy    Mental retardation    Mood disorder    Parkinson disease

## 2019-11-23 NOTE — ED ADULT NURSE NOTE - NEURO ASSESSMENT
pt requesting detox, states he wants to quit drinking, states last drink was 12am last night, labs wnl - - -

## 2019-11-23 NOTE — ED PROVIDER NOTE - PATIENT PORTAL LINK FT
You can access the FollowMyHealth Patient Portal offered by [facility] by registering at the following website: [email or web address]. By joining [employer]’s FollowMyHealth portal, you will also be able to view your health information using other applications (apps) compatible with our system.

## 2019-11-23 NOTE — ED PROVIDER NOTE - OBJECTIVE STATEMENT
23 y/o male with PMHx of cerebral palsy, ADHD, mental retardation presents to the ED c/o cold Sx x 1 week. Pt notes a productive cough with green sputum with associated rhinorrhea, eye tearing and nasal congestion. Pt states he was seen at a clinic PTA and was started on oral abx. However, due to the [facility] policy, pt was required to come to the ED for eval. Pt denies fever, chills, chest pain, palpitations, night sweats, shortness of breath, or any other complaints. NKDA.

## 2019-11-23 NOTE — ED ADULT NURSE NOTE - OBJECTIVE STATEMENT
Hx -ADHD ,accompanied by Ms Mcdermott from the group home .c/o cough , runny nose x1 week .While ED ,Patient became agitated , refused to be discharge , states he want to stay longer ,Patrick BOSTON aware .Patient was transferred to Main ED , reports given to Eliza ZHAO . Hx -ADHD ,accompanied by Ms Mcdermott from the group home .c/o cough , runny nose x1 week .While ED ,Patient became agitated , refused to be discharge , states he want to stay longer,refused to be discharge . ,Patrick NP aware .Patient was transferred to Main ED , reports given to Eliza ZHAO .

## 2019-11-23 NOTE — ED PROVIDER NOTE - PROGRESS NOTE DETAILS
XR results and discharge reviewed with .  No evidence of PNA on XR, will send flonase to pharmacy.  Vital signs stable. Nontoxic and medically stable for discharge. Return precautions provided and patient care taker understands to return to the ED for concerning or worsening signs and symptoms. Instructed to follow up with primary care physician and agreeable. Patient's questions and caretaker's questions answered. XR results and discharge reviewed with .  No evidence of PNA on XR, will send flonase to pharmacy. When patient was told he was going to be discharged, he started to complain of left arm pain.  Arm shows no evidence of trauma, no swelling, redness, warmth.  Patient then stated that he wanted a sling, but advised against it. Patient then started to be upset,  states that she does not feel comfortable to take patient home in van by herself.  Patient signed out to main ED for further management and  discharge planning. XR results and discharge reviewed with .  No evidence of PNA on XR, will send flonase to pharmacy. When patient was told he was going to be discharged, he started to complain of left arm pain.  Arm shows no evidence of trauma, no swelling, redness, warmth.  Patient then stated that he wanted a sling, but advised against it. Patient then started to be upset,  states that she does not feel comfortable to take patient home in van by herself.  Patient ordered for thorazine for agitation, signed out to main ED for further management and  discharge planning. pt is much relax, eating, in no distress, will  d/c to group home with .  Pt did not receive any Thorazine.

## 2019-11-23 NOTE — ED PROVIDER NOTE - ATTENDING CONTRIBUTION TO CARE
25yo male with cerebral palsy lives in a group home presents with rhinorhea and sinus congestion.  afvss lungts cleear sinus not tender TMs clear normal tonsils/ Flonase and dc

## 2020-01-02 ENCOUNTER — EMERGENCY (EMERGENCY)
Facility: HOSPITAL | Age: 25
LOS: 1 days | Discharge: HOME | End: 2020-01-02
Attending: EMERGENCY MEDICINE
Payer: MEDICAID

## 2020-01-02 VITALS
RESPIRATION RATE: 18 BRPM | SYSTOLIC BLOOD PRESSURE: 117 MMHG | OXYGEN SATURATION: 97 % | DIASTOLIC BLOOD PRESSURE: 70 MMHG | HEART RATE: 98 BPM | TEMPERATURE: 98 F

## 2020-01-02 VITALS
RESPIRATION RATE: 15 BRPM | DIASTOLIC BLOOD PRESSURE: 64 MMHG | HEART RATE: 104 BPM | TEMPERATURE: 98 F | HEIGHT: 69 IN | OXYGEN SATURATION: 98 % | SYSTOLIC BLOOD PRESSURE: 108 MMHG

## 2020-01-02 DIAGNOSIS — F71 MODERATE INTELLECTUAL DISABILITIES: ICD-10-CM

## 2020-01-02 DIAGNOSIS — F43.0 ACUTE STRESS REACTION: ICD-10-CM

## 2020-01-02 DIAGNOSIS — F31.76 BIPOLAR DISORDER, IN FULL REMISSION, MOST RECENT EPISODE DEPRESSED: ICD-10-CM

## 2020-01-02 LAB
ALBUMIN SERPL ELPH-MCNC: 4.7 G/DL — SIGNIFICANT CHANGE UP (ref 3.3–5.2)
ALP SERPL-CCNC: 56 U/L — SIGNIFICANT CHANGE UP (ref 40–120)
ALT FLD-CCNC: 13 U/L — SIGNIFICANT CHANGE UP
ANION GAP SERPL CALC-SCNC: 14 MMOL/L — SIGNIFICANT CHANGE UP (ref 5–17)
APAP SERPL-MCNC: <7.5 UG/ML — LOW (ref 10–26)
APPEARANCE UR: CLEAR — SIGNIFICANT CHANGE UP
AST SERPL-CCNC: 20 U/L — SIGNIFICANT CHANGE UP
BILIRUB SERPL-MCNC: 0.2 MG/DL — LOW (ref 0.4–2)
BILIRUB UR-MCNC: NEGATIVE — SIGNIFICANT CHANGE UP
BUN SERPL-MCNC: 12 MG/DL — SIGNIFICANT CHANGE UP (ref 8–20)
CALCIUM SERPL-MCNC: 9.7 MG/DL — SIGNIFICANT CHANGE UP (ref 8.6–10.2)
CHLORIDE SERPL-SCNC: 100 MMOL/L — SIGNIFICANT CHANGE UP (ref 98–107)
CO2 SERPL-SCNC: 24 MMOL/L — SIGNIFICANT CHANGE UP (ref 22–29)
COLOR SPEC: YELLOW — SIGNIFICANT CHANGE UP
CREAT SERPL-MCNC: 0.81 MG/DL — SIGNIFICANT CHANGE UP (ref 0.5–1.3)
DIFF PNL FLD: NEGATIVE — SIGNIFICANT CHANGE UP
ETHANOL SERPL-MCNC: <10 MG/DL — SIGNIFICANT CHANGE UP
GLUCOSE SERPL-MCNC: 106 MG/DL — SIGNIFICANT CHANGE UP (ref 70–115)
GLUCOSE UR QL: NEGATIVE MG/DL — SIGNIFICANT CHANGE UP
HCT VFR BLD CALC: 38.5 % — LOW (ref 39–50)
HGB BLD-MCNC: 12.2 G/DL — LOW (ref 13–17)
KETONES UR-MCNC: NEGATIVE — SIGNIFICANT CHANGE UP
LEUKOCYTE ESTERASE UR-ACNC: NEGATIVE — SIGNIFICANT CHANGE UP
MCHC RBC-ENTMCNC: 27.3 PG — SIGNIFICANT CHANGE UP (ref 27–34)
MCHC RBC-ENTMCNC: 31.7 GM/DL — LOW (ref 32–36)
MCV RBC AUTO: 86.1 FL — SIGNIFICANT CHANGE UP (ref 80–100)
NITRITE UR-MCNC: NEGATIVE — SIGNIFICANT CHANGE UP
PCP SPEC-MCNC: SIGNIFICANT CHANGE UP
PCP SPEC-MCNC: SIGNIFICANT CHANGE UP
PH UR: 8 — SIGNIFICANT CHANGE UP (ref 5–8)
PLATELET # BLD AUTO: 272 K/UL — SIGNIFICANT CHANGE UP (ref 150–400)
POTASSIUM SERPL-MCNC: 4.9 MMOL/L — SIGNIFICANT CHANGE UP (ref 3.5–5.3)
POTASSIUM SERPL-SCNC: 4.9 MMOL/L — SIGNIFICANT CHANGE UP (ref 3.5–5.3)
PROT SERPL-MCNC: 7 G/DL — SIGNIFICANT CHANGE UP (ref 6.6–8.7)
PROT UR-MCNC: NEGATIVE MG/DL — SIGNIFICANT CHANGE UP
RBC # BLD: 4.47 M/UL — SIGNIFICANT CHANGE UP (ref 4.2–5.8)
RBC # FLD: 13.4 % — SIGNIFICANT CHANGE UP (ref 10.3–14.5)
SALICYLATES SERPL-MCNC: <0.6 MG/DL — LOW (ref 10–20)
SODIUM SERPL-SCNC: 138 MMOL/L — SIGNIFICANT CHANGE UP (ref 135–145)
SP GR SPEC: 1.01 — SIGNIFICANT CHANGE UP (ref 1.01–1.02)
UROBILINOGEN FLD QL: NEGATIVE MG/DL — SIGNIFICANT CHANGE UP
WBC # BLD: 4.65 K/UL — SIGNIFICANT CHANGE UP (ref 3.8–10.5)
WBC # FLD AUTO: 4.65 K/UL — SIGNIFICANT CHANGE UP (ref 3.8–10.5)

## 2020-01-02 PROCEDURE — 93005 ELECTROCARDIOGRAM TRACING: CPT

## 2020-01-02 PROCEDURE — 99284 EMERGENCY DEPT VISIT MOD MDM: CPT

## 2020-01-02 PROCEDURE — 36415 COLL VENOUS BLD VENIPUNCTURE: CPT

## 2020-01-02 PROCEDURE — 85027 COMPLETE CBC AUTOMATED: CPT

## 2020-01-02 PROCEDURE — 80053 COMPREHEN METABOLIC PANEL: CPT

## 2020-01-02 PROCEDURE — 80307 DRUG TEST PRSMV CHEM ANLYZR: CPT

## 2020-01-02 PROCEDURE — 93010 ELECTROCARDIOGRAM REPORT: CPT

## 2020-01-02 PROCEDURE — 81003 URINALYSIS AUTO W/O SCOPE: CPT

## 2020-01-02 PROCEDURE — 90792 PSYCH DIAG EVAL W/MED SRVCS: CPT

## 2020-01-02 RX ORDER — CEFPODOXIME PROXETIL 100 MG
200 TABLET ORAL
Refills: 0 | Status: DISCONTINUED | OUTPATIENT
Start: 2020-01-02 | End: 2020-01-02

## 2020-01-02 RX ORDER — HALOPERIDOL DECANOATE 100 MG/ML
5 INJECTION INTRAMUSCULAR ONCE
Refills: 0 | Status: DISCONTINUED | OUTPATIENT
Start: 2020-01-02 | End: 2020-01-09
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
## 2020-01-02 NOTE — ED BEHAVIORAL HEALTH ASSESSMENT NOTE - RISK ASSESSMENT
Chronic poor judgement and insight, childlike behavior.  Pt is in supervised residence Low Acute Suicide Risk

## 2020-01-02 NOTE — ED BEHAVIORAL HEALTH ASSESSMENT NOTE - DETAILS
no h/o SA.  Chronic self harm behaviors, ie head banging for attention and due to poor frustration tolerance. chronic behavioral acting out when frustrated or attention seeking. group home

## 2020-01-02 NOTE — ED BEHAVIORAL HEALTH ASSESSMENT NOTE - OTHER
[facility] moved to new group home 4 days ago not observed no h/o SA or SIB DR Hudson reports Pt states he has AH but has never had them. chronically poor limited adjustment to new [facility] [place]ent residence

## 2020-01-02 NOTE — ED BEHAVIORAL HEALTH ASSESSMENT NOTE - DESCRIPTION
Pt is  happy, appears excited to be in hospital.  Mood is incongruent to reported mood.  No evidence of agitation or aggression and no prn's required. HTN, CP, Asthma, GERD Pt lives with adoptive Aunt until moved to [facility] home
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
## 2020-01-02 NOTE — ED ADULT TRIAGE NOTE - CHIEF COMPLAINT QUOTE
Pt brought in by ambulance from [facility] in [place] for eval of increased agitation and erratic behavior as per staff. As per report from EMS, pt was hitting walls and not "behaving like he normally does". Pt has been at the group [facility] for the past 4 days.

## 2020-01-02 NOTE — ED BEHAVIORAL HEALTH ASSESSMENT NOTE - ADDITIONAL DETAILS ALL
no h/o SA.  Chronic self harm behaviors, ie head banging for attention and due to poor frustration tolerance.

## 2020-01-02 NOTE — ED ADULT NURSE NOTE - CHIEF COMPLAINT QUOTE
Pt brought in by ambulance from [facility] in [place] for eval of increased agitation and erratic behavior as per staff. As per report from EMS, pt was hitting walls and not "behaving like he normally does". Pt has been at the group [place] for the past 4 days.
no hematuria

## 2020-01-02 NOTE — ED BEHAVIORAL HEALTH ASSESSMENT NOTE - ADDITIONAL DETAILS / COMMENTS
Pt has h/o manipulative behavior, attention seeking behavior, feigning illness in order to get hospitalized and is therefore an unreliable historian.

## 2020-01-02 NOTE — ED BEHAVIORAL HEALTH ASSESSMENT NOTE - PRIMARY DX
Acute stress reaction causing mixed disturbance of emotion and conduct Moderate intellectual disability

## 2020-01-03 PROBLEM — G80.9 CEREBRAL PALSY, UNSPECIFIED: Chronic | Status: ACTIVE | Noted: 2019-11-23

## 2020-01-03 PROBLEM — F90.9 ATTENTION-DEFICIT HYPERACTIVITY DISORDER, UNSPECIFIED TYPE: Chronic | Status: ACTIVE | Noted: 2019-11-23

## 2020-01-08 ENCOUNTER — EMERGENCY (EMERGENCY)
Facility: HOSPITAL | Age: 25
LOS: 1 days | Discharge: HOME | End: 2020-01-08
Attending: EMERGENCY MEDICINE
Payer: MEDICAID

## 2020-01-08 VITALS
WEIGHT: 119.93 LBS | HEIGHT: 69 IN | TEMPERATURE: 98 F | DIASTOLIC BLOOD PRESSURE: 66 MMHG | OXYGEN SATURATION: 99 % | HEART RATE: 83 BPM | SYSTOLIC BLOOD PRESSURE: 117 MMHG | RESPIRATION RATE: 18 BRPM

## 2020-01-08 PROCEDURE — 99283 EMERGENCY DEPT VISIT LOW MDM: CPT

## 2020-01-08 PROCEDURE — 99284 EMERGENCY DEPT VISIT MOD MDM: CPT

## 2020-01-08 RX ORDER — ALBUTEROL 90 UG/1
2 AEROSOL, METERED ORAL
Qty: 1 | Refills: 0
Start: 2020-01-08 | End: 2020-02-06

## 2020-01-08 RX ORDER — ONDANSETRON 8 MG/1
1 TABLET, FILM COATED ORAL
Qty: 9 | Refills: 0
Start: 2020-01-08 | End: 2020-01-10

## 2020-01-08 NOTE — ED STATDOCS - NS ED ROS FT
Const: Denies fever, chills  HEENT: Denies blurry vision, sore throat  Neck: Denies neck pain/stiffness  Resp: Denies coughing, SOB  Cardiovascular: Denies CP, palpitations, LE edema  GI: (+) nausea, (+) vomiting, no abdominal pain, diarrhea, constipation, blood in stool  : Denies urinary frequency/urgency/dysuria, hematuria  MSK: Denies back pain  Neuro: Denies HA, dizziness, numbness, weakness  Skin: Denies rashes.

## 2020-01-08 NOTE — ED PROVIDER NOTE - PATIENT PORTAL LINK FT
You can access the FollowMyHealth Patient Portal offered by [facility] by registering at the following website: [email or web address]. By joining [employer]’s FollowMyHealth portal, you will also be able to view your health information using other applications (apps) compatible with our system.

## 2020-01-08 NOTE — ED STATDOCS - PATIENT PORTAL LINK FT
You can access the FollowMyHealth Patient Portal offered by [facility] by registering at the following website: [email or web address]. By joining [employer]’s FollowMyHealth portal, you will also be able to view your health information using other applications (apps) compatible with our system.

## 2020-01-08 NOTE — ED STATDOCS - OBJECTIVE STATEMENT
24 year old male patient with past medical history significant for ADHD, asthma, autism, cerebral palsy, mental retardation, mood disorder, Parkinson's disease presents to the ED c/o post-tussive vomiting, cough, and generalized malaise beginning earlier today. Patient is presenting from [facility] and is accompanied by an aide. Pt was seen on 1/2/20 at [facility] for reports of aggressive behavior and was discharged after a  evaluation. Pt states that he has been coughing for the past week, but vomited 3 times earlier in the day today while he was at his program. He reports that he had mild chest pain when coughing and that he generally felt unwell. He states that he has been compliant with his medications recently.   Aide confirms that pt has had a mild non-productive for the past week, and states that pt was not witnessed vomiting at his program by other people (pt is known to be an unreliable historian). He does not have an inhaler for his asthma. Denies dysuria, hematuria, fever. No further complaints at this time.

## 2020-01-08 NOTE — ED STATDOCS - PHYSICAL EXAMINATION
Const: Awake, alert and oriented. Very animated, slightly agitated, appears to be mildly uncomfortable. Behaves in a childish, attention-seeking manner.   HEENT: NC/AT. Moist mucous membranes.  Eyes: No scleral icterus. EOMI.  Neck:. Soft and supple. Full ROM without pain.  Cardiac: Regular rate and regular rhythm. +S1/S2. Murmur heard on auscultation (is known). Peripheral pulses 2+ and symmetric. No LE edema.  Resp: Lungs clear to auscultation bilaterally. No evidence of respiratory distress. No wheezes, rales or rhonchi.  Abd: Soft, non-tender, non-distended. Normal bowel sounds in all 4 quadrants. No guarding or rebound.  Back: Spine midline and non-tender. No CVAT.  Skin: No rashes, abrasions or lacerations.  Lymph: No cervical lymphadenopathy.  Neuro: Awake, alert & oriented x 3. Moves all extremities symmetrically. Const: Awake, alert and oriented. Very animated, slightly agitated, appears to be mildly uncomfortable. Behaves in a childish, attention-seeking manner.   HEENT: NC/AT. Moist mucous membranes.  Eyes: No scleral icterus. EOMI.  Neck:. Soft and supple. Full ROM without pain.  Cardiac: Regular rate and regular rhythm. +S1/S2. Murmur heard on auscultation (is known). Peripheral pulses 2+ and symmetric.  Resp: Lungs clear to auscultation bilaterally. No evidence of respiratory distress. No wheezes, rales or rhonchi.  Abd: Soft, non-tender, non-distended. Normal bowel sounds in all 4 quadrants. No guarding or rebound.  Back: Spine midline and non-tender. No CVAT.  Skin: No rashes, abrasions or lacerations.  Neuro: Awake, alert & oriented x 3. Moves all extremities symmetrically.
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
## 2020-01-08 NOTE — ED PROVIDER NOTE - OBJECTIVE STATEMENT
23 yo male pmh MR, ADHD, cerebral palsey lives in a group home and sent to ed with aggressive behavior; pt denies any drug or alcohol use; pt admits to taking medication; pt dnies any suicidal or homicidal ideation

## 2020-01-17 ENCOUNTER — EMERGENCY (EMERGENCY)
Facility: HOSPITAL | Age: 25
LOS: 1 days | Discharge: HOME | End: 2020-01-17
Attending: STUDENT IN AN ORGANIZED HEALTH CARE EDUCATION/TRAINING PROGRAM
Payer: MEDICAID

## 2020-01-17 VITALS
HEIGHT: 66 IN | RESPIRATION RATE: 16 BRPM | WEIGHT: 119.93 LBS | DIASTOLIC BLOOD PRESSURE: 65 MMHG | SYSTOLIC BLOOD PRESSURE: 111 MMHG | TEMPERATURE: 99 F | OXYGEN SATURATION: 95 % | HEART RATE: 94 BPM

## 2020-01-17 DIAGNOSIS — F71 MODERATE INTELLECTUAL DISABILITIES: ICD-10-CM

## 2020-01-17 DIAGNOSIS — F63.81 INTERMITTENT EXPLOSIVE DISORDER: ICD-10-CM

## 2020-01-17 DIAGNOSIS — F31.9 BIPOLAR DISORDER, UNSPECIFIED: ICD-10-CM

## 2020-01-17 LAB
AMPHET UR-MCNC: NEGATIVE — SIGNIFICANT CHANGE UP
ANION GAP SERPL CALC-SCNC: 14 MMOL/L — SIGNIFICANT CHANGE UP (ref 5–17)
APAP SERPL-MCNC: <7.5 UG/ML — LOW (ref 10–26)
BARBITURATES UR SCN-MCNC: NEGATIVE — SIGNIFICANT CHANGE UP
BENZODIAZ UR-MCNC: NEGATIVE — SIGNIFICANT CHANGE UP
BUN SERPL-MCNC: 16 MG/DL — SIGNIFICANT CHANGE UP (ref 8–20)
CALCIUM SERPL-MCNC: 10.1 MG/DL — SIGNIFICANT CHANGE UP (ref 8.6–10.2)
CHLORIDE SERPL-SCNC: 104 MMOL/L — SIGNIFICANT CHANGE UP (ref 98–107)
CO2 SERPL-SCNC: 22 MMOL/L — SIGNIFICANT CHANGE UP (ref 22–29)
COCAINE METAB.OTHER UR-MCNC: NEGATIVE — SIGNIFICANT CHANGE UP
CREAT SERPL-MCNC: 0.83 MG/DL — SIGNIFICANT CHANGE UP (ref 0.5–1.3)
ETHANOL SERPL-MCNC: <10 MG/DL — SIGNIFICANT CHANGE UP
GLUCOSE SERPL-MCNC: 92 MG/DL — SIGNIFICANT CHANGE UP (ref 70–115)
HCT VFR BLD CALC: 41.1 % — SIGNIFICANT CHANGE UP (ref 39–50)
HGB BLD-MCNC: 13 G/DL — SIGNIFICANT CHANGE UP (ref 13–17)
MCHC RBC-ENTMCNC: 27.3 PG — SIGNIFICANT CHANGE UP (ref 27–34)
MCHC RBC-ENTMCNC: 31.6 GM/DL — LOW (ref 32–36)
MCV RBC AUTO: 86.3 FL — SIGNIFICANT CHANGE UP (ref 80–100)
METHADONE UR-MCNC: NEGATIVE — SIGNIFICANT CHANGE UP
OPIATES UR-MCNC: NEGATIVE — SIGNIFICANT CHANGE UP
PCP SPEC-MCNC: SIGNIFICANT CHANGE UP
PCP UR-MCNC: POSITIVE
PLATELET # BLD AUTO: 246 K/UL — SIGNIFICANT CHANGE UP (ref 150–400)
POTASSIUM SERPL-MCNC: 4.4 MMOL/L — SIGNIFICANT CHANGE UP (ref 3.5–5.3)
POTASSIUM SERPL-SCNC: 4.4 MMOL/L — SIGNIFICANT CHANGE UP (ref 3.5–5.3)
RBC # BLD: 4.76 M/UL — SIGNIFICANT CHANGE UP (ref 4.2–5.8)
RBC # FLD: 13.2 % — SIGNIFICANT CHANGE UP (ref 10.3–14.5)
SALICYLATES SERPL-MCNC: <0.6 MG/DL — LOW (ref 10–20)
SODIUM SERPL-SCNC: 140 MMOL/L — SIGNIFICANT CHANGE UP (ref 135–145)
THC UR QL: NEGATIVE — SIGNIFICANT CHANGE UP
WBC # BLD: 8.07 K/UL — SIGNIFICANT CHANGE UP (ref 3.8–10.5)
WBC # FLD AUTO: 8.07 K/UL — SIGNIFICANT CHANGE UP (ref 3.8–10.5)

## 2020-01-17 PROCEDURE — 90792 PSYCH DIAG EVAL W/MED SRVCS: CPT

## 2020-01-17 PROCEDURE — 93010 ELECTROCARDIOGRAM REPORT: CPT

## 2020-01-17 PROCEDURE — 99284 EMERGENCY DEPT VISIT MOD MDM: CPT

## 2020-01-17 RX ORDER — CHLORPROMAZINE HCL 10 MG
50 TABLET ORAL THREE TIMES A DAY
Refills: 0 | Status: DISCONTINUED | OUTPATIENT
Start: 2020-01-17 | End: 2020-02-03

## 2020-01-17 RX ORDER — FLUTICASONE PROPIONATE 50 MCG
1 SPRAY, SUSPENSION NASAL ONCE
Refills: 0 | Status: DISCONTINUED | OUTPATIENT
Start: 2020-01-18 | End: 2020-02-03

## 2020-01-17 RX ORDER — RISPERIDONE 4 MG/1
6 TABLET ORAL AT BEDTIME
Refills: 0 | Status: DISCONTINUED | OUTPATIENT
Start: 2020-01-17 | End: 2020-02-03

## 2020-01-17 RX ORDER — METOPROLOL TARTRATE 50 MG
12.5 TABLET ORAL
Refills: 0 | Status: DISCONTINUED | OUTPATIENT
Start: 2020-01-17 | End: 2020-02-03

## 2020-01-17 RX ORDER — LAMOTRIGINE 25 MG/1
200 TABLET, ORALLY DISINTEGRATING ORAL
Refills: 0 | Status: DISCONTINUED | OUTPATIENT
Start: 2020-01-17 | End: 2020-02-03

## 2020-01-17 RX ORDER — BENZTROPINE MESYLATE 1 MG
1 TABLET ORAL
Refills: 0 | Status: DISCONTINUED | OUTPATIENT
Start: 2020-01-17 | End: 2020-02-03

## 2020-01-17 RX ORDER — ACETAMINOPHEN 500 MG
650 TABLET ORAL ONCE
Refills: 0 | Status: COMPLETED | OUTPATIENT
Start: 2020-01-17 | End: 2020-01-17

## 2020-01-17 RX ORDER — ALBUTEROL 90 UG/1
2 AEROSOL, METERED ORAL EVERY 6 HOURS
Refills: 0 | Status: DISCONTINUED | OUTPATIENT
Start: 2020-01-17 | End: 2020-02-03

## 2020-01-17 RX ADMIN — Medication 1 MILLIGRAM(S): at 17:05

## 2020-01-17 RX ADMIN — Medication 12.5 MILLIGRAM(S): at 17:05

## 2020-01-17 RX ADMIN — Medication 650 MILLIGRAM(S): at 22:40

## 2020-01-17 RX ADMIN — Medication 50 MILLIGRAM(S): at 22:07

## 2020-01-17 RX ADMIN — Medication 650 MILLIGRAM(S): at 23:30

## 2020-01-17 RX ADMIN — LAMOTRIGINE 200 MILLIGRAM(S): 25 TABLET, ORALLY DISINTEGRATING ORAL at 17:07

## 2020-01-17 RX ADMIN — RISPERIDONE 6 MILLIGRAM(S): 4 TABLET ORAL at 22:07

## 2020-01-17 NOTE — ED BEHAVIORAL HEALTH ASSESSMENT NOTE - CURRENT MEDICATION
Cogentin 1 mg bid, [id number] mg 1/2 tab bid, Lamictal 200 mg bid, Risperdal 3 mg 2 tabs at hs, thorazine 50mg bid
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
## 2020-01-17 NOTE — ED ADULT TRIAGE NOTE - CHIEF COMPLAINT QUOTE
[facility]. [facility] at [facility],  Call Caron Cesar, at [facility] [phone] or [phone] for more information.  Dr. Lainez to bedside for behavioral health evaluation.

## 2020-01-17 NOTE — ED ADULT NURSE REASSESSMENT NOTE - COMFORT CARE
plan of care explained/po fluids offered/meal provided
ambulated to bathroom/darkened lights/side rails down/meal provided/warm blanket provided
plan of care explained/po fluids offered

## 2020-01-17 NOTE — ED BEHAVIORAL HEALTH ASSESSMENT NOTE - DESCRIPTION
Pt appears euthymic. No agitation or aggression since arrival. HTN, CP, Asthma, GERD Pt lives with adoptive Aunt prior to group homes

## 2020-01-17 NOTE — ED BEHAVIORAL HEALTH ASSESSMENT NOTE - OTHER
group home moved to new [facility] home not observed no h/o SA or SIB DR Hudson reports Pt will state he has AH but has never had them. chronically poor limited adjustment to new environment

## 2020-01-17 NOTE — ED PROVIDER NOTE - CLINICAL SUMMARY MEDICAL DECISION MAKING FREE TEXT BOX
patient sent in for  eval, patient notes vomiting, seen in ED a few days prior for same complaint, patient with mild dehydration but tolerating PO today. vital signs stable. abd soft and nontender and able to jump up and down, no concern for surgical pathology. patient to be evluated by , check basic labs. po hydration.

## 2020-01-17 NOTE — ED PROVIDER NOTE - OBJECTIVE STATEMENT
25yo M with ADHD, MR/CP/autism, from group home for aggressive behavior, random outbursts of throwing things. has been takin gmeds, but group home wants evauated for adjustment. patient states had a cough and vomited a few times- was seen in ED other day. normal BM. no h/o abd sx. states vomited once today and is dehydrated. normal urination. had breakfast and lunch today without difficulty

## 2020-01-17 NOTE — ED ADULT NURSE NOTE - OBJECTIVE STATEMENT
Patient was brought in from outpatient program [facility] by EMS after he reportedly became aggressive and throw things.  Patient also reportedly has been aggressive at home but exact behaviors unknown.  Patient reports he "Does not want to go home".  Patient has a dx of moderate intellectual disability and presents overly bright, loud, and childlike.  Patient was assessed in [facility] ED on 1/2/20 after being aggressive at program and reporting he wanted to hurt himself and was discharged home from ED.  Patient not aggressive or combative on  presentation.  Patient states "I want to stay the night and go home, I was behaving". Patient is a poor historian.

## 2020-01-17 NOTE — ED BEHAVIORAL HEALTH ASSESSMENT NOTE - DETAILS
no h/o SA.  Chronic self harm behaviors, ie head banging for attention and due to poor frustration tolerance. chronic behavioral acting out when frustrated or attention seeking. group home staff team aware

## 2020-01-17 NOTE — ED BEHAVIORAL HEALTH ASSESSMENT NOTE - HPI (INCLUDE ILLNESS QUALITY, SEVERITY, DURATION, TIMING, CONTEXT, MODIFYING FACTORS, ASSOCIATED SIGNS AND SYMPTOMS)
23 yo disabled male living in new group home in [place] x 1 month,  transferred from placement in [place] when he aged out, PPH Bipolar Disorder, Unspecified psychosis. 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 He was seen in [facility] ED for aggression 2 weeks ago.  Pt is smiling and grinning, stating he "acted out" because staff was not paying enough attention to him. Pt now denies HI, aggression or anger. Denies si/avh. He states that he likes his home and wants to return; then contradicts by stating he wants to be admitted for 2 weeks. He denies depressed mood or anxiety. He does not appear internally preoccupied.  Collateral, staff from [facility], reports she knows him and this in not new behavior.  He is often manipulative and attempts to call police or have staff call police for transport to the hospital. He will make multiple medical c/o that have no merit. He has had explosive behavior at the home x 6-7 episodes, in which he injured staff or damaged property. She believes this is a mix of behaviors and mood instability. The main concern is harming others. His previous placement was more restrictive than this one. He has been titrated up to lamictal 200mg bid and started on thorazine 5 days ago, in addition to risperidone.  Attempted to call Dr Hudson - psychiatrist but unable to reach.  Collateral from Dr Hudson as per last evaluation on chart 1/2/20:  "She reports Pt is very manipulative to get attention and is a high utilizer of ED's, but no h/o SA in past or known prior psych admissions.  Dr Hudson reports behavior is chronic and recently she placed Pt on Lamictal in anticipation of adjusting to new surrounding and is up to 150 mg bid, with plan to titrate to 400.  Pt does respond well to firm limits as she does and to not feed into poor behavior.  Pt currently admits to wanting attention.  If he had three wishes, he stated #1 would be getting an IV, 2 would be hospital admission for 2 weeks and 3 would be to get Oxygen.  He admits to watching TV showing involving hospitals.   Dr Hudson  he usually admits to lying later on, claiming he make statements  to get attention.  Pt is fairly related, smiling, no agitation or aggression, poor historian limited to secondary gains of wanting psych or medical admissions."

## 2020-01-17 NOTE — ED ADULT NURSE NOTE - CAS DISC DELAYS
[facility]

               [address] [place] [address]

Test Date:    2017               Test Time:    06:32:56

Pat Name:     MARY HIDALGO             Department:   

Patient ID:   [id number]           Room:          

Gender:       F                        Technician:   

:          [id number]               Requested By: JANE HERNANDEZ

Order Number: [id number]            Reading MD:   Laura Neely

                                 Measurements

Intervals                              Axis          

Rate:         107                      P:            65

GA:           Lauar Neely
Waiting private transportation

## 2020-01-17 NOTE — ED PROVIDER NOTE - PROGRESS NOTE DETAILS
Pt signed out to me by Dr. Campo pending psych reassessment. Pt cleared by psychiatry. will d/c to home with outpatient f/up. instructed to return for any new/concerning symptoms.  Pt given a copy of all results and instructed to f/up with pcp regarding any abnormal results. Called back to bedside as patient stated he was having abdominal pain. Pt well-appearing. abdomen - soft, nt. no rebound. no guarding. no concern for acute intraabdominal process at this time. Pt reassured and instructed to f/up as an outpatient.

## 2020-01-17 NOTE — ED PROVIDER NOTE - PHYSICAL EXAMINATION
Gen: AOx3, hyperactive, poor eye contact, pressured speech, fidgeting   Head: NCAT  HEENT: EOMI, oral mucosa slightly dry, normal conjunctiva, neck supple  Lung: CTAB, no respiratory distress  CV: rrr, no murmur, Normal perfusion  Abd: soft, NTND, able to jump up and down  MSK: No edema, no visible deformities  Neuro: No focal neurologic deficits  Skin: No rash   Psych: see gen

## 2020-01-17 NOTE — ED PROVIDER NOTE - PATIENT PORTAL LINK FT
You can access the FollowMyHealth Patient Portal offered by [facility] by registering at the following website: [email or web address]. By joining [facility]’s FollowMyHealth portal, you will also be able to view your health information using other applications (apps) compatible with our system.

## 2020-01-17 NOTE — ED ADULT NURSE NOTE - CHIEF COMPLAINT QUOTE
Sent from [facility] for medication adjustment. [facility] and was [facility] things at the office,  Call Caron Cesar, at [facility] [phone] or [phone] for more information.  Dr. Lainez to bedside for behavioral health evaluation.

## 2020-01-18 VITALS
RESPIRATION RATE: 18 BRPM | TEMPERATURE: 98 F | SYSTOLIC BLOOD PRESSURE: 104 MMHG | HEART RATE: 84 BPM | OXYGEN SATURATION: 98 % | DIASTOLIC BLOOD PRESSURE: 66 MMHG

## 2020-01-18 LAB
APPEARANCE UR: CLEAR — SIGNIFICANT CHANGE UP
BACTERIA # UR AUTO: ABNORMAL
BILIRUB UR-MCNC: NEGATIVE — SIGNIFICANT CHANGE UP
COLOR SPEC: YELLOW — SIGNIFICANT CHANGE UP
COMMENT - URINE: SIGNIFICANT CHANGE UP
COMMENT - URINE: SIGNIFICANT CHANGE UP
DIFF PNL FLD: NEGATIVE — SIGNIFICANT CHANGE UP
EPI CELLS # UR: NEGATIVE — SIGNIFICANT CHANGE UP
GLUCOSE UR QL: NEGATIVE MG/DL — SIGNIFICANT CHANGE UP
KETONES UR-MCNC: NEGATIVE — SIGNIFICANT CHANGE UP
LEUKOCYTE ESTERASE UR-ACNC: NEGATIVE — SIGNIFICANT CHANGE UP
NITRITE UR-MCNC: NEGATIVE — SIGNIFICANT CHANGE UP
PH UR: 6 — SIGNIFICANT CHANGE UP (ref 5–8)
PROT UR-MCNC: 30 MG/DL
RBC CASTS # UR COMP ASSIST: SIGNIFICANT CHANGE UP /HPF (ref 0–4)
SP GR SPEC: 1.02 — SIGNIFICANT CHANGE UP (ref 1.01–1.02)
UROBILINOGEN FLD QL: NEGATIVE MG/DL — SIGNIFICANT CHANGE UP
WBC UR QL: SIGNIFICANT CHANGE UP

## 2020-01-18 PROCEDURE — 93005 ELECTROCARDIOGRAM TRACING: CPT

## 2020-01-18 PROCEDURE — 99285 EMERGENCY DEPT VISIT HI MDM: CPT

## 2020-01-18 PROCEDURE — 80307 DRUG TEST PRSMV CHEM ANLYZR: CPT

## 2020-01-18 PROCEDURE — 85027 COMPLETE CBC AUTOMATED: CPT

## 2020-01-18 PROCEDURE — 99211 OFF/OP EST MAY X REQ PHY/QHP: CPT

## 2020-01-18 PROCEDURE — 36415 COLL VENOUS BLD VENIPUNCTURE: CPT

## 2020-01-18 PROCEDURE — 80048 BASIC METABOLIC PNL TOTAL CA: CPT

## 2020-01-18 PROCEDURE — 81001 URINALYSIS AUTO W/SCOPE: CPT

## 2020-01-18 RX ADMIN — Medication 50 MILLIGRAM(S): at 06:01

## 2020-01-18 RX ADMIN — LAMOTRIGINE 200 MILLIGRAM(S): 25 TABLET, ORALLY DISINTEGRATING ORAL at 06:00

## 2020-01-18 RX ADMIN — Medication 50 MILLIGRAM(S): at 15:17

## 2020-01-18 RX ADMIN — Medication 1 MILLIGRAM(S): at 06:01

## 2020-01-18 RX ADMIN — Medication 12.5 MILLIGRAM(S): at 06:00

## 2020-01-18 NOTE — ED ADULT NURSE REASSESSMENT NOTE - NS ED NURSE REASSESS COMMENT FT1
patient awaken to verbal stimuli tolerated am meds.  Ambulated to bathroom with steady gait. urine sample obtained and sent to lab
patient c/o "tummy pain" and wants a pill and also a headache.  Dr. Campo made aware and [id number] mg po given as ordered.
patient sleeping nad noted. out of bed to bathroom ambulates with steady gait. no c/o will monitor and chart changes and maintain safe environment
pt alert in the common area, with meal in front of him. meal partially eaten approx. 20%, pt c/o epigastric pain and states that he has had diarrhea "% times" pt has not been noted to use the restroom since early am. pt states with an euthermic affect that "I want to go to medical" multiple times, pt also states that he has vomited and reveals to this writer a scant amount of emesis in the toilet. abdomen palpated in 4 quadrants with pt reporting pain in each quadrant but shows no non verbal indication of tenderness, bowel hypoactive bowel sounds noted in all quadrants.  pt shows no non verbal indication that he has abdominal distress, ed attending made aware and will re-assess pt.
pt awake alert and calm, pt demonstrating self control, offers multiple medical complaints and appears pre-occupied with being seen medically.
pt resting on stretcher calm and cooperative, snack offered and consumed, no acute distress noted. medicated per orders. plan of care discussed with pt.
Patient calm resting in bed.  Patient had residence staff at bedside but no staff currently present.  Patient complied with medications.  No attempts to harm self or others and safety maintained.
assumed care of patient. patient alert and cooperative. repositioning of self. ambulates with steady gait. dinner tray in room. patient offered po fluids. patient denies any pain or discomfort .will monitor for changes and maintain safe environment

## 2020-01-18 NOTE — ED BEHAVIORAL HEALTH NOTE - BEHAVIORAL HEALTH NOTE
Per psych team, pt is T&R and can return to his group home. ESTEBAN spoke to Guille at [facility] who reports pt can return today, and staff will transport pt home at 3pm. ESTEBAN inquired about pt's mental health provider. Guille reported that since pt recently was moved to their house, pt has not been able to get an appt with a psychiatrist. ESTEBAN made appt for pt at [facility] for 1/23 at 1pm. HIPAA form completed, staff made aware. No further ESTEBAN services at this time

## 2020-01-18 NOTE — ED BEHAVIORAL HEALTH NOTE - BEHAVIORAL HEALTH NOTE
PROGRESS NOTE: 20 @ 12:13  	  • Reason for Ongoing Consultation: 	    ID: [id number] Male with HEALTH ISSUES - PROBLEM Dx:  Moderate intellectual disability  Intermittent explosive disorder  Bipolar disorder          INTERVAL DATA:   • Interval Chief Complaint: "I was acting out....I want to go home now".  • Interval History: Pt seen for follow up today.  Held due to reports of agitation in group home.  No behavioral issues in ED.  Watched TV ate well.  No behavioral disturbances since in ED.  Admits to "acting out".  Laughed  when asked if he likes attention when he misbehaves.  Pt did not require sedation or prns.  No psychotic symptoms or jena elicited.  Asked to wrap up leftover fruit to bring with him.  No contraindications to discharge.    REVIEW OF SYSTEMS:   • Constitutional Symptoms	No complaints  • Eyes	No complaints  • Ears / Nose / Throat / Mouth	No complaints  • Cardiovascular	No complaints  • Respiratory	No complaints  • Gastrointestinal	No complaints  • Genitourinary	No complaints  • Musculoskeletal	No complaints  • Skin	No complaints  • Neurological	No complaints  • Psychiatric (see HPI)	See HPI  • Endocrine	No complaints  • Hematologic / Lymphatic	No complaints  • Allergic / Immunologic	No complaints    REVIEW OF VITALS/LABS/IMAGING/INVESTIGATIONS:   • Vital signs reviewed: Yes  • Vital Signs:	    T(C): 36.6 (20 @ 11:08), Max: 37.1 (20 @ 12:31)  HR: 83 (20 @ 11:08) (69 - 96)  BP: 97/63 (20 @ 11:08) (97/63 - 116/69)  RR: 20 (20 @ 11:08) (16 - 20)  SpO2: 97% (20 @ 11:08) (95% - 100%)    • Available labs reviewed: Yes  • Available Lab Results:                           13.0   8.07  )-----------( 246      ( 2020 13:59 )             41.1         140  |  104  |  16.0  ----------------------------<  92  4.4   |  22.0  |  0.83    Ca    10.1      2020 13:59          Urinalysis Basic - ( 2020 06:26 )    Color: Yellow / Appearance: Clear / S.020 / pH: x  Gluc: x / Ketone: Negative  / Bili: Negative / Urobili: Negative mg/dL   Blood: x / Protein: 30 mg/dL / Nitrite: Negative   Leuk Esterase: Negative / RBC: 0-2 /HPF / WBC 0-2   Sq Epi: x / Non Sq Epi: Negative / Bacteria: Occasional          MEDICATIONS:      PRN Medications:  • PRN Medications since last evaluation	  • PRN Details	    Current Medications:   ALBUTerol    90 MICROgram(s) HFA Inhaler 2 Puff(s) Inhalation every 6 hours PRN  benztropine 1 milliGRAM(s) Oral two times a day  chlorproMAZINE    Tablet 50 milliGRAM(s) Oral three times a day  fluticasone propionate 50 MICROgram(s)/spray Nasal Spray 1 Spray(s) Both Nostrils Once  lamoTRIgine 200 milliGRAM(s) Oral two times a day  metoprolol tartrate 12.5 milliGRAM(s) Oral two times a day  risperiDONE   Tablet 6 milliGRAM(s) Oral at bedtime     Medication Side Effects:  • Medication Side Effects or Adverse Reactions (new or ongoing)	None known    MENTAL STATUS EXAM:   • Level of Consciousness	Alert  • General Appearance	Well developed  • Body Habitus	Well nourished  • Hygiene	fair  • Grooming	fair  • Behavior	Cooperative  • Eye Contact	Good  • Relatedness	fair  • Impulse Control	Normal  • Muscle Tone / Strength	Normal muscle tone/strength  • Abnormal Movements	No abnormal movements  • Gait / Station	Normal gait / station  • Speech Volume	Normal  • Speech Rate	Normal  • Speech Spontaneity	Normal  • Speech Articulation	Normal  • Mood	Normal  • Affect Quality	Euthymic  • Affect Range	Full  • Affect Congruence	Congruent  • Thought Process	Linear  • Thought Associations	Normal  • Thought Content	Unremarkable  • Perceptions	No abnormalities  • Oriented to Time	 Yes  • Oriented to Place	 Yes  • Oriented to Situation yes  • Oriented to Person	Yes  • Attention / Concentration	Normal  • Estimated Intelligence	Average  • Recent Memory	Normal  • Remote Memory	Normal  • Fund of Knowledge	impaired  • Language	No abnormalities noted  • Judgment (regarding everyday events)	chronically poor  • Insight (regarding psychiatric illness)	 poor    SUICIDALITY:   • Suicidality (Interval)	none known    HOMICIDALITY/AGGRESSION:   • Homicidality/Aggression	none known    DIAGNOSIS DSM-V:    Psychiatric Diagnosis (Corresponds to DSM-IV Axis I, II):   HEALTH ISSUES - PROBLEM Dx:  Moderate intellectual disability  Intermittent explosive disorder  Bipolar disorder           Medical Diagnosis (Corresponds to DSM-IV Axis III):  • Axis III	      ASSESSMENT OF CURRENT CONDITION:   Summary (include case differential, formulation and patient response to therapy):   Pt seen for follow up today.  Held due to reports of agitation in group home.  No behavioral issues in ED.  Watched TV ate well.  No behavioral disturbances since in ED.  Admits to "acting out".  Laughed  when asked if he likes attention when he misbehaves.  Pt did not require sedation or prns.  No psychotic symptoms or jena elicited.  Asked to wrap up leftover fruit to bring with him.  No contraindications to discharge.  May return to group home and followe up with out pt provider    Risk Assessment (consider static vs modifiable risk factors and protective factors; comment on level of risk for dangerous behavior):   chronic behavioral issues and aggression, no past SA

## 2020-02-01 ENCOUNTER — OUTPATIENT (OUTPATIENT)
Dept: OUTPATIENT SERVICES | Facility: HOSPITAL | Age: 25
LOS: 1 days | End: 2020-02-01

## 2020-02-05 ENCOUNTER — NON-APPOINTMENT (OUTPATIENT)
Age: 25
End: 2020-02-05

## 2020-02-05 ENCOUNTER — APPOINTMENT (OUTPATIENT)
Dept: CARDIOLOGY | Facility: CLINIC | Age: 25
End: 2020-02-05
Payer: MEDICAID

## 2020-02-05 VITALS — DIASTOLIC BLOOD PRESSURE: 61 MMHG | SYSTOLIC BLOOD PRESSURE: 112 MMHG | WEIGHT: 123 LBS

## 2020-02-05 VITALS
HEART RATE: 92 BPM | DIASTOLIC BLOOD PRESSURE: 57 MMHG | HEIGHT: 69 IN | OXYGEN SATURATION: 97 % | SYSTOLIC BLOOD PRESSURE: 111 MMHG

## 2020-02-05 DIAGNOSIS — J45.20 MILD INTERMITTENT ASTHMA, UNCOMPLICATED: ICD-10-CM

## 2020-02-05 DIAGNOSIS — Z83.3 FAMILY HISTORY OF DIABETES MELLITUS: ICD-10-CM

## 2020-02-05 DIAGNOSIS — F31.9 BIPOLAR DISORDER, UNSPECIFIED: ICD-10-CM

## 2020-02-05 PROCEDURE — 93000 ELECTROCARDIOGRAM COMPLETE: CPT

## 2020-02-05 PROCEDURE — 99205 OFFICE O/P NEW HI 60 MIN: CPT | Mod: 25

## 2020-02-05 RX ORDER — MULTIPLE VITAMINS W/ MINERALS TAB 9MG-400MCG
TAB ORAL DAILY
Refills: 0 | Status: ACTIVE | COMMUNITY
Start: 2020-02-05

## 2020-02-05 NOTE — HISTORY OF PRESENT ILLNESS
[FreeTextEntry1] : out of breathe ,  elevated  blood pressure\par \par This is a 24 year old male with history of  autism,  lives in group home, cerebral palsy, ADHD, bipolar,. here with high blood pressure and abnoraml EKG.\par patient comunnicated has ticks and abnormal commmunicationsl. understands questions. answers appropriately. 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 he is short of breathe.\par  he states his blood pressure is elevated. His mother had spinal surgery and hypertension \par his father: had heart problem. he drank alcochol. chest surgery. \par +_ coughing a lot. and difficulty breathing

## 2020-02-05 NOTE — REVIEW OF SYSTEMS
[see HPI] : see HPI [Shortness Of Breath] : shortness of breath [Dyspnea on exertion] : dyspnea during exertion [Chest  Pressure] : no chest pressure [Chest Pain] : chest pain [Leg Claudication] : no intermittent leg claudication [Palpitations] : no palpitations [Negative] : Endocrine

## 2020-02-05 NOTE — REASON FOR VISIT
[Initial Evaluation] : an initial evaluation of [FreeTextEntry2] : out of breathe ,  elevated  blood pressure

## 2020-02-05 NOTE — DISCUSSION/SUMMARY
[Patient] : the patient [Benefits] : benefits [Risks] : risks [Alternatives] : alternatives [With Me] : with me [___ Month(s)] : [unfilled] month(s) [FreeTextEntry1] : This is a 24 year old male with autism,  asthma, bipolar, ADHD and on multiple meds here with dyspnea on exertion and abnormal EKG and elevated blood pressure without diagnosis of hypertension \par \par 1) elevated blood pressure without diagnosis of hypertension : 24 hr BP monitor. On metoprolol . for a long time. ct that for now. \par 2) dyspnea on exertion : 2D echo.  has history of asthma,. 2D echo with bubble study\par 3) ABNORMAL EKG: r/o ASD. \par

## 2020-02-05 NOTE — PHYSICAL EXAM
[Normal Appearance] : normal appearance [General Appearance - Well Developed] : well developed [Well Groomed] : well groomed [General Appearance - Well Nourished] : well nourished [No Deformities] : no deformities [Eyelids - No Xanthelasma] : the eyelids demonstrated no xanthelasmas [General Appearance - In No Acute Distress] : no acute distress [Normal Conjunctiva] : the conjunctiva exhibited no abnormalities [Normal Oral Mucosa] : normal oral mucosa [No Oral Cyanosis] : no oral cyanosis [No Oral Pallor] : no oral pallor [Normal Jugular Venous A Waves Present] : normal jugular venous A waves present [Normal Jugular Venous V Waves Present] : normal jugular venous V waves present [No Jugular Venous Montelongo A Waves] : no jugular venous montelongo A waves [Heart Rate And Rhythm] : heart rate and rhythm were normal [Heart Sounds] : normal S1 and S2 [Murmurs] : no murmurs present [Auscultation Breath Sounds / Voice Sounds] : lungs were clear to auscultation bilaterally [Exaggerated Use Of Accessory Muscles For Inspiration] : no accessory muscle use [Respiration, Rhythm And Depth] : normal respiratory rhythm and effort [Abdomen Tenderness] : non-tender [Abdomen Soft] : soft [Abdomen Mass (___ Cm)] : no abdominal mass palpated [Abnormal Walk] : normal gait [Nail Clubbing] : no clubbing of the fingernails [Gait - Sufficient For Exercise Testing] : the gait was sufficient for exercise testing [Cyanosis, Localized] : no localized cyanosis [Petechial Hemorrhages (___cm)] : no petechial hemorrhages [] : no rash [Skin Color & Pigmentation] : normal skin color and pigmentation [No Skin Ulcers] : no skin ulcer [No Venous Stasis] : no venous stasis [Skin Lesions] : no skin lesions [No Xanthoma] : no  xanthoma was observed [Oriented To Time, Place, And Person] : oriented to person, place, and time [Mood] : the mood was normal [No Anxiety] : not feeling anxious [Affect] : the affect was normal

## 2020-02-11 ENCOUNTER — APPOINTMENT (OUTPATIENT)
Dept: CARDIOLOGY | Facility: CLINIC | Age: 25
End: 2020-02-11
Payer: MEDICAID

## 2020-02-11 PROCEDURE — 93306 TTE W/DOPPLER COMPLETE: CPT

## 2020-02-13 DIAGNOSIS — Z71.89 OTHER SPECIFIED COUNSELING: ICD-10-CM

## 2020-02-24 ENCOUNTER — EMERGENCY (EMERGENCY)
Facility: HOSPITAL | Age: 25
LOS: 1 days | Discharge: HOME | End: 2020-02-24
Attending: EMERGENCY MEDICINE
Payer: MEDICAID

## 2020-02-24 VITALS
RESPIRATION RATE: 18 BRPM | HEART RATE: 81 BPM | DIASTOLIC BLOOD PRESSURE: 62 MMHG | SYSTOLIC BLOOD PRESSURE: 101 MMHG | OXYGEN SATURATION: 99 % | TEMPERATURE: 99 F

## 2020-02-24 VITALS
SYSTOLIC BLOOD PRESSURE: 123 MMHG | HEIGHT: 67 IN | DIASTOLIC BLOOD PRESSURE: 65 MMHG | OXYGEN SATURATION: 99 % | RESPIRATION RATE: 16 BRPM | TEMPERATURE: 98 F | WEIGHT: 160.06 LBS | HEART RATE: 98 BPM

## 2020-02-24 DIAGNOSIS — F63.81 INTERMITTENT EXPLOSIVE DISORDER: ICD-10-CM

## 2020-02-24 DIAGNOSIS — F31.74 BIPOLAR DISORDER, IN FULL REMISSION, MOST RECENT EPISODE MANIC: ICD-10-CM

## 2020-02-24 DIAGNOSIS — F79 UNSPECIFIED INTELLECTUAL DISABILITIES: ICD-10-CM

## 2020-02-24 PROCEDURE — 99284 EMERGENCY DEPT VISIT MOD MDM: CPT

## 2020-02-24 PROCEDURE — 90792 PSYCH DIAG EVAL W/MED SRVCS: CPT

## 2020-02-24 NOTE — ED BEHAVIORAL HEALTH ASSESSMENT NOTE - DETAILS
no h/o SA.  Chronic self harm behaviors, ie head banging for attention and due to poor frustration tolerance. chronic behavioral acting out when frustrated or attention seeking. c/o hand pain, no notable injury or swelling Group home staff reportedly on the way, per collateral

## 2020-02-24 NOTE — ED ADULT NURSE NOTE - OBJECTIVE STATEMENT
pt received smiling and pleasant in the interview room. pt has an euthymic affect. pt also displays rapid pressured speech. admits to "punching the walls at program" when questioned why he felt the urge to do so pt states "I don't know" repetitively. pt denies intent to harm others or himself. pt denies auditory or visual hallucinations. pt is directable and compliant with staff requests.

## 2020-02-24 NOTE — ED BEHAVIORAL HEALTH ASSESSMENT NOTE - CURRENT MEDICATION
Cogentin 1 mg bid, Metoprolol 25 mg 1/2 tab bid, Lamictal 200 mg bid, Risperdal 3 mg 2 tabs at hs, Thorazine 50mg bid

## 2020-02-24 NOTE — ED BEHAVIORAL HEALTH ASSESSMENT NOTE - RISK ASSESSMENT
RF chronic maladaptive attention seeking behaviors, poor frustration tolerance and impulse control.  PF in treatment, stable housing Low Acute Suicide Risk

## 2020-02-24 NOTE — ED PROVIDER NOTE - PATIENT PORTAL LINK FT
You can access the FollowMyHealth Patient Portal offered by [facility] by registering at the following website: [email or web address]. By joining [facility]’s FollowMyHealth portal, you will also be able to view your health information using other applications (apps) compatible with our system.

## 2020-02-24 NOTE — ED BEHAVIORAL HEALTH ASSESSMENT NOTE - HPI (INCLUDE ILLNESS QUALITY, SEVERITY, DURATION, TIMING, CONTEXT, MODIFYING FACTORS, ASSOCIATED SIGNS AND SYMPTOMS)
25 yo disabled male living in new long-term in [place],  transferred from placement in [place] when he aged out, PPH Bipolar Disorder, Unspecified psychosis. 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  Pt seen on multiple occasions for similar behavior in past few months and this behaviors are chronic.  Pt is smiling and grinning, stating he "was aggressive" and that he needs to have his hand treated.  Pt asked why he acted out and claims he did not know.  Per collateral Pt was in good  behavioral control all day until the end.  Pt was upset and stated he did not want to go home and kicked the wall causing a hole in the wall.  With further limit setting Pt became increasingly aggressive requiring a take down.  Pt then became aggressive, kicking spitting and kicked wall again.  Collateral reports when policed arrived he was calm.  Pt has been calm since entering ED.  He is eating dinner and insisting he has a injury and showed a reddened area to hand.  Pt admits to sometimes behaving in this manner for attention and for secondary gains, as he enjoys hospital visits and usually wants to be admitted and /or get an IV.   Pt now denies HI, aggression or anger. Denies si/avh.   He denies depressed mood or anxiety. He does not appear internally preoccupied.     Collateral from Dr Hudson as per prior evaluation on chart 1/2/20:    " Pt is very manipulative to get attention and is a high utilizer of ED's, but no h/o SA in past or known prior psych admissions.  Dr Hudson reports behavior is chronic and recently she placed Pt on Lamictal in anticipation of adjusting to new surrounding and is up to 150 mg bid, with plan to titrate to 400.  Pt does respond well to firm limits as she does and to not feed into poor behavior. "

## 2020-02-24 NOTE — ED BEHAVIORAL HEALTH ASSESSMENT NOTE - SUMMARY
23 yo disabled male living in new jail in [place],  transferred from placement in [place] when he aged out, PPH Bipolar Disorder, Unspecified psychosis. 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  Pt seen on multiple occasions for similar behavior in past few months and this behaviors are chronic.  Pt presents with behavior consistent with past ED evaluations.  No evidence of aggression, violence or agitation.  Pt is known to act out for secondary gains of attention or hospital visit.  Pt has no acute current behaviors which require in pt psych admissions as he is at baseline/  Pt is cleared psychiatrically for discharge back to group home.

## 2020-02-24 NOTE — ED PROVIDER NOTE - OBJECTIVE STATEMENT
24 year old male with PMH autism, intermittent explosive disorder, schizophrenia presents from adult day care for aggressive and agitated behavior, acting aggressive. By the time the pt arrived, he was calm and cooperative and deneid complaints, including no SI, HI, hallucinations

## 2020-02-24 NOTE — ED ADULT NURSE NOTE - HPI (INCLUDE ILLNESS QUALITY, SEVERITY, DURATION, TIMING, CONTEXT, MODIFYING FACTORS, ASSOCIATED SIGNS AND SYMPTOMS)
pt was medically cleared by the ed attending to conduct a psychiatric eval. pt was screened by security for weapons and contraband. history of MR and psychiatric issues.

## 2020-02-24 NOTE — ED BEHAVIORAL HEALTH ASSESSMENT NOTE - OTHER
group home did not want to go home no h/o SA or SIB DR Hudson reports Pt will state he has AH but has never had them. chronically poor limited

## 2020-02-24 NOTE — ED ADULT TRIAGE NOTE - CHIEF COMPLAINT QUOTE
pt with schizophrenia, autism and intermittent explosive disorder comes in from adult day care for aggressive behavior.  as per ems pt punched wall multiple times and was screaming. pt is calm and cooperative at this time. at baseline mental status.

## 2020-03-04 ENCOUNTER — EMERGENCY (EMERGENCY)
Facility: HOSPITAL | Age: 25
LOS: 1 days | End: 2020-03-04
Attending: EMERGENCY MEDICINE
Payer: MEDICAID

## 2020-03-04 VITALS
OXYGEN SATURATION: 98 % | RESPIRATION RATE: 20 BRPM | TEMPERATURE: 98 F | HEIGHT: 67 IN | DIASTOLIC BLOOD PRESSURE: 70 MMHG | HEART RATE: 89 BPM | WEIGHT: 130.07 LBS | SYSTOLIC BLOOD PRESSURE: 155 MMHG

## 2020-03-04 PROCEDURE — 99284 EMERGENCY DEPT VISIT MOD MDM: CPT

## 2020-03-04 PROCEDURE — 73140 X-RAY EXAM OF FINGER(S): CPT

## 2020-03-04 PROCEDURE — 73140 X-RAY EXAM OF FINGER(S): CPT | Mod: 26,LT

## 2020-03-04 PROCEDURE — 90792 PSYCH DIAG EVAL W/MED SRVCS: CPT

## 2020-03-04 NOTE — ED ADULT NURSE NOTE - OBJECTIVE STATEMENT
Pt A&OX3, amb ad jeffery, c/o pain to left hand.  Pt was lennie from day care center.  As per group home caregiver, pt has been very aggressive at the group home towards staff, punching and pushing the staff.  Pt states he promises he will not do it again.  Mild swelling noted to left wrist area, no obvious deformity.  Moving all ext well.  Group home care giver requesting psych eval.

## 2020-03-04 NOTE — ED PROVIDER NOTE - CARE PLAN
Principal Discharge DX:	ADHD (attention deficit hyperactivity disorder)  Secondary Diagnosis:	Finger pain, left

## 2020-03-04 NOTE — ED PROVIDER NOTE - PATIENT PORTAL LINK FT
You can access the FollowMyHealth Patient Portal offered by [facility] by registering at the following website: [email or web address]. By joining [employer]’s FollowMyHealth portal, you will also be able to view your health information using other applications (apps) compatible with our system.

## 2020-03-04 NOTE — ED ADULT TRIAGE NOTE - CHIEF COMPLAINT QUOTE
as per EMS PD requested pt be brought in for "aggressive behavior." pt cooperative and calm in triage. pt c/o b/l hand pain. pt has h/o autism and episodes of acting out.

## 2020-03-04 NOTE — ED BEHAVIORAL HEALTH ASSESSMENT NOTE - HPI (INCLUDE ILLNESS QUALITY, SEVERITY, DURATION, TIMING, CONTEXT, MODIFYING FACTORS, ASSOCIATED SIGNS AND SYMPTOMS)
Patient is a 25 yo disabled male living in new [facility] in [place],  transferred from placement in [place] when he aged out, PPH Bipolar Disorder, Unspecified psychosis. 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 He reportedly tried to kick staff and was sent to ER for evaluation of agitation.      Patient was accompanied by staff from [facility] who knows patient well.   Pt seen on multiple occasions for similar behavior in past few months and this behaviors are chronic.  As per staff, patient "likes to go to ER" and has h/o multiple visits.  Patient acts out in order to trigger ER visits. He has been at baseline and staff at home are able to manage patient's behaviors.  Patient has been compliant with his medications. He has been eating well and sleeping well. No S/H I/I/P. Staff feels comfortable with discharge back to home     Patient was smiling.  He stated that he  Pt is smiling and grinning, stating he "was aggressive" and that he needs to have his hand treated.  Pt asked why he acted out and claims he did not know.  Per collateral Pt was in good  behavioral control all day until the end.  Pt was upset and stated he did not want to go home and kicked the wall causing a hole in the wall.  With further limit setting Pt became increasingly aggressive requiring a take down.  Pt then became aggressive, kicking spitting and kicked wall again.  Collateral reports when policed arrived he was calm.  Pt has been calm since entering ED.  He is eating dinner and insisting he has a injury and showed a reddened area to hand.  Pt admits to sometimes behaving in this manner for attention and for secondary gains, as he enjoys hospital visits and usually wants to be admitted and /or get an IV.   Pt now denies HI, aggression or anger. Denies si/avh.   He denies depressed mood or anxiety. He does not appear internally preoccupied.     Collateral from Dr Hudson as per prior evaluation on chart 1/2/20:    " Pt is very manipulative to get attention and is a high utilizer of ED's, but no h/o SA in past or known prior psych admissions.  Dr Hudson reports behavior is chronic and recently she placed Pt on Lamictal in anticipation of adjusting to new surrounding and is up to 150 mg bid, with plan to titrate to 400.  Pt does respond well to firm limits as she does and to not feed into poor behavior. " Patient is a 25 yo disabled male living in new [facility] in [place],  transferred from placement in [place] when he aged out, PPH Bipolar Disorder, Unspecified psychosis. 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 He reportedly tried to kick staff and was sent to ER for evaluation of agitation.      Patient was accompanied by staff from [facility] who knows patient well.   Pt seen on multiple occasions for similar behavior in past few months and this behaviors are chronic.  As per staff, patient "likes to go to ER" and has h/o multiple visits.  Patient acts out in order to trigger ER visits. He has been at baseline and staff at home are able to manage patient's behaviors.  Patient has been compliant with his medications. He has been eating well and sleeping well. No S/H I/I/P. Staff feels comfortable with discharge back to home     Patient was smiling.  He stated that he wanted to come to the hospital because "I feel like it".   Concerning other psychiatric symptoms, pt denies depressed mood,  has been his usual self.  Pt denies any suicidal ideation, intent or plan as well as any aggressive or violent behavior towards others. Pt denies any episodes of bizarre happiness, unusual energy, unusual nightime excitation or other common symptoms of jena. Pt denies hearing voices or seeing things.  No delusions were elicited.  Patient denies pervasive anxiety.       Collateral from Dr Hudson as per prior evaluation on chart 1/2/20:    " Pt is very manipulative to get attention and is a high utilizer of ED's, but no h/o SA in past or known prior psych admissions.  Pt does respond well to firm limits as she does and to not feed into poor behavior. "
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
## 2020-03-04 NOTE — ED BEHAVIORAL HEALTH ASSESSMENT NOTE - OTHER
staff [facility] total of 7 residents and staff did not want to go home no h/o SA or SIB DR Hudson reports Pt will state he has AH but has never had them. chronically poor limited billing in [place]

## 2020-03-04 NOTE — ED PROVIDER NOTE - CLINICAL SUMMARY MEDICAL DECISION MAKING FREE TEXT BOX
24 year old man with ADHD acting out at the group home outing now calm not aggressive.  Patient had some finger pain but refused tylenol.  Xray negative for fracture.  Group home demanded psych eval before discharge.  Patient seen by psych and cleared for discharge.

## 2020-03-04 NOTE — ED BEHAVIORAL HEALTH ASSESSMENT NOTE - DETAILS
no h/o SA.  Chronic self harm behaviors, ie head banging for attention and due to poor frustration tolerance. chronic behavioral acting out when frustrated or attention seeking. c/o hand pain, no notable injury or swelling Group home staff reportedly on the way, per collateral spoke to staff from home

## 2020-03-04 NOTE — ED PROVIDER NOTE - PROGRESS NOTE DETAILS
The supervisor at the facility (Keily) was called.  She was told that patient has not been aggressive and has not been physically aggressive to staff and he has required no medication for sedation/aggression.  The facility is refusing to take the patient back unless he is evaluated by psychiatry. Psych called and made aware. Patient cleared by psych.

## 2020-03-04 NOTE — ED BEHAVIORAL HEALTH ASSESSMENT NOTE - ACCOMPANIED BY
Outcome Facilitation Team (OFT) round progress note     Patient: Lacho Ervin, 54 year old       LOS: 3 day(s)  Hours in Observation:     Living situation :  Homeless  Type of residence :   [facility]   Payor: [facility] [employer]ITY [employer] STATE / Plan: T19 [facility] [id number] / Product Type: T19 HMO    Needs Assessment:   IV fluids/rate appropriate - N/A capped  IV antibiotics required - Yes  Telemetry needs - Telemetry: On (10/26/17 1000)  Therapy needs - Yes   Last bowel movement - Stool Occurrence: 0 (10/27/17 0900),    [place] present - Additional Parameters: Urinary catheter (10/27/17 0939) acute and N/A  Isolation - Contact  Adequate pain control - Yes    LACE(5-9):Moderate            3 LACE Length of Stay    1 LACE Charlson Comorbidity Index Evalution    2 LACE Visits to ED Previous 6 Months        Criteria that do not apply:    LACE Acute Admission               Barriers to discharge: need to complete IV antibiotics, therapy needs and not medically ready for discharge    Next Steps: continue to monitor    Pharmacy discussion/recommendations (specify if any):    Anticipated/Expected discharge  10/29/2017    Destination at discharge: : TBD    Team members present: physician, nurse,  and    Self

## 2020-03-04 NOTE — ED PROVIDER NOTE - OBJECTIVE STATEMENT
This patient is a 24 year old man who presents to the ER via EMS with reports of agitation and aggression.  Triage nurse reports that EMS states that patient has behavioral issues and was acting out at home.  The police was called and subsequently EMS was called and patient taken to the hospital.  Patient c/o 3rd digit pain of the left hand.  He states that he may have injured it playing basketball.  Patient states that he is now calm denies agitation.

## 2020-03-04 NOTE — ED BEHAVIORAL HEALTH ASSESSMENT NOTE - DESCRIPTION
Pt appears euthymic. No agitation or aggression since arrival.  He was not in physical distress HTN, CP, Asthma, GERD Pt lives with adoptive Aunt prior to group homes Pt appears euthymic. No agitation or aggression since arrival.  He was not in physical distress.     Vital Signs Last 24 Hrs  T(C): 36.6 (04 Mar 2020 14:06), Max: 36.6 (04 Mar 2020 14:06)  T(F): 97.8 (04 Mar 2020 14:06), Max: 97.8 (04 Mar 2020 14:06)  HR: 89 (04 Mar 2020 14:06) (89 - 89)  BP: 155/70 (04 Mar 2020 14:06) (155/70 - 155/70)  BP(mean): --  RR: 20 (04 Mar 2020 14:06) (20 - 20)  SpO2: 98% (04 Mar 2020 14:06) (98% - 98%)

## 2020-03-04 NOTE — ED ADULT NURSE NOTE - CAS EDN DISCHARGE ASSESSMENT
Alert and oriented to person, place and time/Awake/No adverse reaction to first time med in ED/Symptoms improved/Patient baseline mental status

## 2020-03-04 NOTE — ED BEHAVIORAL HEALTH ASSESSMENT NOTE - SUMMARY
25 yo disabled male living in new skilled nursing in [place],  transferred from placement in [place] when he aged out, PPH Bipolar Disorder, Unspecified psychosis. 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  Pt seen on multiple occasions for similar behavior in past few months and this behaviors are chronic.  Pt presents with behavior consistent with past ED evaluations.  No evidence of aggression, violence or agitation.  Pt is known to act out for secondary gains of attention or hospital visit.  Pt has no acute current behaviors which require in pt psych admissions as he is at baseline/  Pt is cleared psychiatrically for discharge back to group home. 25 yo disabled male living in new retirement in [place],  transferred from placement in [place] when he aged out, PPH Bipolar Disorder, Unspecified psychosis. 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  Pt presents with behavior consistent with past ED evaluations.  No evidence of aggression, violence or agitation.  Pt is known to act out for secondary gains of attention or hospital visit.  Pt has no acute current behaviors which require in pt psych admissions as he is at baseline/  Pt is cleared psychiatrically for discharge back to group home.

## 2020-03-04 NOTE — ED PROVIDER NOTE - PSYCHIATRIC, MLM
Alert and oriented to person, place. heightened mood; and expressive with hand motions; no apparent risk to self or others.

## 2020-06-16 DIAGNOSIS — M79.643 PAIN IN UNSPECIFIED HAND: ICD-10-CM

## 2020-06-16 DIAGNOSIS — Z86.59 PERSONAL HISTORY OF OTHER MENTAL AND BEHAVIORAL DISORDERS: ICD-10-CM

## 2020-06-16 DIAGNOSIS — Z86.69 PERSONAL HISTORY OF OTHER DISEASES OF THE NERVOUS SYSTEM AND SENSE ORGANS: ICD-10-CM

## 2020-06-16 DIAGNOSIS — F39 UNSPECIFIED MOOD [AFFECTIVE] DISORDER: ICD-10-CM

## 2020-06-16 DIAGNOSIS — F90.1 ATTENTION-DEFICIT HYPERACTIVITY DISORDER, PREDOMINANTLY HYPERACTIVE TYPE: ICD-10-CM

## 2020-06-16 DIAGNOSIS — Z87.09 PERSONAL HISTORY OF OTHER DISEASES OF THE RESPIRATORY SYSTEM: ICD-10-CM

## 2020-06-16 RX ORDER — LAMOTRIGINE 200 MG/1
200 TABLET ORAL TWICE DAILY
Refills: 0 | Status: ACTIVE | COMMUNITY
Start: 2020-02-05

## 2020-06-17 ENCOUNTER — APPOINTMENT (OUTPATIENT)
Dept: CARDIOLOGY | Facility: CLINIC | Age: 25
End: 2020-06-17
Payer: MEDICAID

## 2020-06-17 ENCOUNTER — NON-APPOINTMENT (OUTPATIENT)
Age: 25
End: 2020-06-17

## 2020-06-17 VITALS
HEIGHT: 69 IN | DIASTOLIC BLOOD PRESSURE: 68 MMHG | SYSTOLIC BLOOD PRESSURE: 129 MMHG | OXYGEN SATURATION: 97 % | HEART RATE: 89 BPM | WEIGHT: 132 LBS | BODY MASS INDEX: 19.55 KG/M2 | TEMPERATURE: 98.5 F

## 2020-06-17 DIAGNOSIS — R03.0 ELEVATED BLOOD-PRESSURE READING, W/OUT DIAGNOSIS OF HYPERTENSION: ICD-10-CM

## 2020-06-17 PROCEDURE — 93000 ELECTROCARDIOGRAM COMPLETE: CPT

## 2020-06-17 PROCEDURE — 99215 OFFICE O/P EST HI 40 MIN: CPT

## 2020-06-17 RX ORDER — METOPROLOL TARTRATE 25 MG/1
25 TABLET, FILM COATED ORAL TWICE DAILY
Qty: 90 | Refills: 1 | Status: ACTIVE | COMMUNITY
Start: 2020-02-05

## 2020-06-17 NOTE — PHYSICAL EXAM
[General Appearance - Well Developed] : well developed [Normal Appearance] : normal appearance [Well Groomed] : well groomed [No Deformities] : no deformities [General Appearance - In No Acute Distress] : no acute distress [General Appearance - Well Nourished] : well nourished [Eyelids - No Xanthelasma] : the eyelids demonstrated no xanthelasmas [Normal Conjunctiva] : the conjunctiva exhibited no abnormalities [Normal Oral Mucosa] : normal oral mucosa [No Oral Pallor] : no oral pallor [No Oral Cyanosis] : no oral cyanosis [Normal Jugular Venous A Waves Present] : normal jugular venous A waves present [No Jugular Venous Montelongo A Waves] : no jugular venous montelongo A waves [Normal Jugular Venous V Waves Present] : normal jugular venous V waves present [Exaggerated Use Of Accessory Muscles For Inspiration] : no accessory muscle use [Auscultation Breath Sounds / Voice Sounds] : lungs were clear to auscultation bilaterally [Respiration, Rhythm And Depth] : normal respiratory rhythm and effort [Murmurs] : no murmurs present [Heart Rate And Rhythm] : heart rate and rhythm were normal [Heart Sounds] : normal S1 and S2 [Abdomen Tenderness] : non-tender [Abdomen Soft] : soft [Gait - Sufficient For Exercise Testing] : the gait was sufficient for exercise testing [Abdomen Mass (___ Cm)] : no abdominal mass palpated [Abnormal Walk] : normal gait [Nail Clubbing] : no clubbing of the fingernails [Petechial Hemorrhages (___cm)] : no petechial hemorrhages [Cyanosis, Localized] : no localized cyanosis [Skin Color & Pigmentation] : normal skin color and pigmentation [] : no rash [No Venous Stasis] : no venous stasis [No Skin Ulcers] : no skin ulcer [Skin Lesions] : no skin lesions [Oriented To Time, Place, And Person] : oriented to person, place, and time [No Xanthoma] : no  xanthoma was observed [Affect] : the affect was normal [No Anxiety] : not feeling anxious [Mood] : the mood was normal

## 2020-06-17 NOTE — CARDIOLOGY SUMMARY
[___] : [unfilled] [LVEF ___%] : LVEF [unfilled]% [___] : [unfilled] [Normal] : normal LA size [None] : no mitral regurgitation

## 2020-06-17 NOTE — REVIEW OF SYSTEMS
[Dyspnea on exertion] : dyspnea during exertion [see HPI] : see HPI [Shortness Of Breath] : shortness of breath [Leg Claudication] : no intermittent leg claudication [Chest Pain] : chest pain [Chest  Pressure] : no chest pressure [Palpitations] : no palpitations [Negative] : Heme/Lymph

## 2020-06-17 NOTE — DISCUSSION/SUMMARY
[Patient] : the patient [Benefits] : benefits [Risks] : risks [Alternatives] : alternatives [___ Month(s)] : [unfilled] month(s) [FreeTextEntry1] : This is a 24 year old male with autism,  asthma, bipolar, ADHD and on multiple meds here with dyspnea on exertion and abnormal EKG and elevated blood pressure without diagnosis of hypertension \par \par 1) elevated blood pressure without diagnosis of hypertension : 24 hr BP monitor showed unremarkable . On metoprolol . for a long time. ct that for now.  will not change it,. \par 2) dyspnea on exertion : 2D echo. :  unremarkable  has history of asthma,.  \par 3) ABNORMAL EKG:  no congenital heart problems on echo. \par \par No further cardiac work up is needed currently.  PRN follow up.  [With PCP] : with ~his/her~ primary care provider

## 2020-06-17 NOTE — HISTORY OF PRESENT ILLNESS
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     Plan:  - home meds, including ASA, Plavix, home anti-HTN drugs, and insulin  - Diabetic diet  - ISS with Lantus and mealtime insulin  - Vanco/Zosyn for R heel dry gangrene  - SQH, no SCDs  - Betadine to R heel wound with Kerlix wrap  - OR likely Monday  - NPO at midnight [FreeTextEntry1] : out of breathe ,  elevated  blood pressure\par HPI for today: : feels good. denies complaints. feels good. NO ehadaches. nodizziness. no chest pain. .no dyspnea on exertio \par \par old note: This is a 24 year old male with history of  autism,  lives in group home, cerebral palsy, ADHD, bipolar,. here with high blood pressure and abnoraml EKG.\par patient comunnicated has ticks and abnormal commmunicationsl. understands questions. answers appropriately. he states he plays basketball in [email or web address] and he has shortnes of breathe when he plays and also when he runs. he is short of breathe.\par  he states his blood pressure is elevated. His mother had spinal surgery and hypertension \par his father: had heart problem. he drank alcochol. chest surgery. \par +_ coughing a lot. and difficulty breathing

## 2020-06-17 NOTE — REASON FOR VISIT
[FreeTextEntry2] : out of breathe ,  elevated  blood pressure [Initial Evaluation] : an initial evaluation of

## 2020-08-06 ENCOUNTER — EMERGENCY (EMERGENCY)
Facility: HOSPITAL | Age: 25
LOS: 1 days | Discharge: HOME | End: 2020-08-06
Attending: EMERGENCY MEDICINE
Payer: MEDICAID

## 2020-08-06 VITALS
DIASTOLIC BLOOD PRESSURE: 65 MMHG | OXYGEN SATURATION: 98 % | RESPIRATION RATE: 18 BRPM | SYSTOLIC BLOOD PRESSURE: 117 MMHG | HEART RATE: 118 BPM | TEMPERATURE: 98 F

## 2020-08-06 VITALS
RESPIRATION RATE: 16 BRPM | SYSTOLIC BLOOD PRESSURE: 118 MMHG | DIASTOLIC BLOOD PRESSURE: 60 MMHG | HEART RATE: 88 BPM | OXYGEN SATURATION: 99 %

## 2020-08-06 DIAGNOSIS — F31.70 BIPOLAR DISORDER, CURRENTLY IN REMISSION, MOST RECENT EPISODE UNSPECIFIED: ICD-10-CM

## 2020-08-06 DIAGNOSIS — F63.81 INTERMITTENT EXPLOSIVE DISORDER: ICD-10-CM

## 2020-08-06 DIAGNOSIS — F43.29 ADJUSTMENT DISORDER WITH OTHER SYMPTOMS: ICD-10-CM

## 2020-08-06 LAB — SARS-COV-2 RNA SPEC QL NAA+PROBE: SIGNIFICANT CHANGE UP

## 2020-08-06 PROCEDURE — 73090 X-RAY EXAM OF FOREARM: CPT | Mod: 26,LT

## 2020-08-06 PROCEDURE — 73110 X-RAY EXAM OF WRIST: CPT | Mod: 26,RT

## 2020-08-06 PROCEDURE — 99284 EMERGENCY DEPT VISIT MOD MDM: CPT

## 2020-08-06 PROCEDURE — 90792 PSYCH DIAG EVAL W/MED SRVCS: CPT

## 2020-08-06 PROCEDURE — 73130 X-RAY EXAM OF HAND: CPT

## 2020-08-06 PROCEDURE — 73130 X-RAY EXAM OF HAND: CPT | Mod: 26,50

## 2020-08-06 PROCEDURE — 87635 SARS-COV-2 COVID-19 AMP PRB: CPT

## 2020-08-06 PROCEDURE — 73110 X-RAY EXAM OF WRIST: CPT

## 2020-08-06 PROCEDURE — 73090 X-RAY EXAM OF FOREARM: CPT

## 2020-08-06 RX ORDER — IBUPROFEN 200 MG
600 TABLET ORAL ONCE
Refills: 0 | Status: COMPLETED | OUTPATIENT
Start: 2020-08-06 | End: 2020-08-06

## 2020-08-06 RX ADMIN — Medication 600 MILLIGRAM(S): at 09:54

## 2020-08-06 NOTE — ED PROVIDER NOTE - PATIENT PORTAL LINK FT
You can access the FollowMyHealth Patient Portal offered by [facility] by registering at the following website: [email or web address]. By joining [email or web address]’s FollowMyHealth portal, you will also be able to view your health information using other applications (apps) compatible with our system.
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
## 2020-08-06 NOTE — CHART NOTE - NSCHARTNOTEFT_GEN_A_CORE
SW Note: Per med team, pt is medically and psychiatrically cleared for discharge. Pt resides in [facility]. SW met with pt and staff member from [facility], Logan at bedside. Logan reports they just need a copy of d/c papers, pt's most recent vitals and covid results for pt to return. Logan states there is no return form or any additional documents required for pt to return. SW inquired about transport home, Logan reports staff will transport pt. Pt provided with d/c docs and requested clinicals and covid results. No further SW services identified at this time

## 2020-08-06 NOTE — ED BEHAVIORAL HEALTH ASSESSMENT NOTE - DESCRIPTION
Pt appears euthymic. No agitation or aggression since arrival.  He was not in physical distress.     Vital Signs Last 24 Hrs  T(C): 36.9 (06 Aug 2020 08:45), Max: 36.9 (06 Aug 2020 08:45)  T(F): 98.5 (06 Aug 2020 08:45), Max: 98.5 (06 Aug 2020 08:45)  HR: 118 (06 Aug 2020 08:45) (118 - 118)  BP: 117/65 (06 Aug 2020 08:45) (117/65 - 117/65)  BP(mean): --  RR: 18 (06 Aug 2020 08:45) (18 - 18)  SpO2: 98% (06 Aug 2020 08:45) (98% - 98%) HTN, CP, Asthma, GERD Pt lived with adoptive Aunt prior to group homes
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
## 2020-08-06 NOTE — ED ADULT NURSE NOTE - PMH
ADHD (attention deficit hyperactivity disorder)    Asthma    Autism    Cerebral palsy    Explosive personality disorder    Mental retardation    Mood disorder    Parkinson disease
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
## 2020-08-06 NOTE — ED PROVIDER NOTE - CARE PROVIDER_API CALL
Hilda Rogers)  Orthopedics  [address] [place] [address], [address]  [place] [address]  Phone: [phone]  Fax: [phone]  Follow Up Time:

## 2020-08-06 NOTE — ED BEHAVIORAL HEALTH ASSESSMENT NOTE - DETAILS
no h/o SA.  Chronic self harm behaviors, ie head banging for attention and due to poor frustration tolerance. chronic behavioral acting out when frustrated or attention seeking. c/o hand pain, no notable injury or swelling spoke to staff from home spoke to staff from home who was at bedside

## 2020-08-06 NOTE — ED PROVIDER NOTE - NSFOLLOWUPINSTRUCTIONS_ED_ALL_ED_FT
-FOLLOW UP WITH ORTHOPEDIST  -FOLLOW UP WITH PRIMARY MEDICAL DOCTOR  -TAKE MOTRIN AND TYLENOL AS NEEDED FOR PAIN

## 2020-08-06 NOTE — ED BEHAVIORAL HEALTH ASSESSMENT NOTE - SUMMARY
23 yo disabled male living in new [facility] in [place],  transferred from placement in [place] when he aged out, PPH mood Disorder, Unspecified psychosis. 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  Patient has  been evaluated and currently would not benefit from an inpatient psychiatric admission and should follow up with facility psychiatrist   Pt is cleared psychiatrically for discharge back to group [facility].
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
## 2020-08-06 NOTE — ED ADULT NURSE NOTE - NS_SISCREENINGSR_GEN_ALL_ED
Pt c/o hairloss.  She delivered 9/30/2018, had Mirena inserted 8 weeks ago.  Reassured her hairloss is common in the first year PP but should stop at about 1 year. .     Negative

## 2020-08-06 NOTE — ED PROVIDER NOTE - MUSCULOSKELETAL, MLM
R hand with diffuse tenderness, pt refusing to try flexing or extending wrist and fingers, L hand mild diffuse tenderness, R forearm diffuse mild tenderness.  no TTP, b/l elbows
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
## 2020-08-11 PROBLEM — F60.3 BORDERLINE PERSONALITY DISORDER: Chronic | Status: ACTIVE | Noted: 2020-08-06

## 2020-08-13 ENCOUNTER — APPOINTMENT (OUTPATIENT)
Dept: ORTHOPEDIC SURGERY | Facility: CLINIC | Age: 25
End: 2020-08-13
Payer: MEDICAID

## 2020-08-13 VITALS
WEIGHT: 132 LBS | DIASTOLIC BLOOD PRESSURE: 69 MMHG | SYSTOLIC BLOOD PRESSURE: 116 MMHG | BODY MASS INDEX: 19.55 KG/M2 | HEART RATE: 73 BPM | HEIGHT: 69 IN

## 2020-08-13 DIAGNOSIS — M79.642 PAIN IN RIGHT HAND: ICD-10-CM

## 2020-08-13 DIAGNOSIS — M79.641 PAIN IN RIGHT HAND: ICD-10-CM

## 2020-08-13 PROCEDURE — 99204 OFFICE O/P NEW MOD 45 MIN: CPT

## 2020-08-26 ENCOUNTER — EMERGENCY (EMERGENCY)
Facility: HOSPITAL | Age: 25
LOS: 1 days | Discharge: HOME | End: 2020-08-26
Attending: EMERGENCY MEDICINE
Payer: MEDICAID

## 2020-08-26 VITALS
HEART RATE: 87 BPM | RESPIRATION RATE: 16 BRPM | SYSTOLIC BLOOD PRESSURE: 102 MMHG | TEMPERATURE: 97 F | OXYGEN SATURATION: 97 % | DIASTOLIC BLOOD PRESSURE: 63 MMHG

## 2020-08-26 LAB
AMPHET UR-MCNC: NEGATIVE — SIGNIFICANT CHANGE UP
APPEARANCE UR: CLEAR — SIGNIFICANT CHANGE UP
BACTERIA # UR AUTO: ABNORMAL
BARBITURATES UR SCN-MCNC: NEGATIVE — SIGNIFICANT CHANGE UP
BENZODIAZ UR-MCNC: NEGATIVE — SIGNIFICANT CHANGE UP
BILIRUB UR-MCNC: NEGATIVE — SIGNIFICANT CHANGE UP
COCAINE METAB.OTHER UR-MCNC: NEGATIVE — SIGNIFICANT CHANGE UP
COLOR SPEC: YELLOW — SIGNIFICANT CHANGE UP
COMMENT - URINE: SIGNIFICANT CHANGE UP
DIFF PNL FLD: NEGATIVE — SIGNIFICANT CHANGE UP
EPI CELLS # UR: NEGATIVE — SIGNIFICANT CHANGE UP
GLUCOSE UR QL: NEGATIVE MG/DL — SIGNIFICANT CHANGE UP
KETONES UR-MCNC: NEGATIVE — SIGNIFICANT CHANGE UP
LEUKOCYTE ESTERASE UR-ACNC: ABNORMAL
METHADONE UR-MCNC: NEGATIVE — SIGNIFICANT CHANGE UP
NITRITE UR-MCNC: NEGATIVE — SIGNIFICANT CHANGE UP
OPIATES UR-MCNC: NEGATIVE — SIGNIFICANT CHANGE UP
PCP SPEC-MCNC: SIGNIFICANT CHANGE UP
PCP UR-MCNC: NEGATIVE — SIGNIFICANT CHANGE UP
PH UR: 7 — SIGNIFICANT CHANGE UP (ref 5–8)
PROT UR-MCNC: NEGATIVE MG/DL — SIGNIFICANT CHANGE UP
RBC CASTS # UR COMP ASSIST: NEGATIVE /HPF — SIGNIFICANT CHANGE UP (ref 0–4)
SP GR SPEC: 1.01 — SIGNIFICANT CHANGE UP (ref 1.01–1.02)
THC UR QL: NEGATIVE — SIGNIFICANT CHANGE UP
UROBILINOGEN FLD QL: NEGATIVE MG/DL — SIGNIFICANT CHANGE UP
WBC UR QL: SIGNIFICANT CHANGE UP

## 2020-08-26 PROCEDURE — 93010 ELECTROCARDIOGRAM REPORT: CPT

## 2020-08-26 PROCEDURE — 90792 PSYCH DIAG EVAL W/MED SRVCS: CPT

## 2020-08-26 PROCEDURE — 99218: CPT

## 2020-08-26 RX ORDER — BENZTROPINE MESYLATE 1 MG
1 TABLET ORAL
Refills: 0 | Status: DISCONTINUED | OUTPATIENT
Start: 2020-08-26 | End: 2020-08-31

## 2020-08-26 RX ORDER — LAMOTRIGINE 25 MG/1
200 TABLET, ORALLY DISINTEGRATING ORAL
Refills: 0 | Status: DISCONTINUED | OUTPATIENT
Start: 2020-08-26 | End: 2020-08-31

## 2020-08-26 RX ORDER — RISPERIDONE 4 MG/1
3 TABLET ORAL AT BEDTIME
Refills: 0 | Status: DISCONTINUED | OUTPATIENT
Start: 2020-08-26 | End: 2020-08-31

## 2020-08-26 RX ORDER — METOPROLOL TARTRATE 50 MG
12.5 TABLET ORAL
Refills: 0 | Status: DISCONTINUED | OUTPATIENT
Start: 2020-08-26 | End: 2020-08-31

## 2020-08-26 RX ORDER — ALBUTEROL 90 UG/1
2 AEROSOL, METERED ORAL EVERY 6 HOURS
Refills: 0 | Status: DISCONTINUED | OUTPATIENT
Start: 2020-08-26 | End: 2020-08-31

## 2020-08-26 RX ORDER — CHLORPROMAZINE HCL 10 MG
50 TABLET ORAL
Refills: 0 | Status: DISCONTINUED | OUTPATIENT
Start: 2020-08-26 | End: 2020-08-31

## 2020-08-26 RX ORDER — FLUTICASONE PROPIONATE 50 MCG
1 SPRAY, SUSPENSION NASAL EVERY 12 HOURS
Refills: 0 | Status: DISCONTINUED | OUTPATIENT
Start: 2020-08-26 | End: 2020-08-31

## 2020-08-26 RX ADMIN — Medication 50 MILLIGRAM(S): at 18:48

## 2020-08-26 RX ADMIN — RISPERIDONE 3 MILLIGRAM(S): 4 TABLET ORAL at 22:03

## 2020-08-26 RX ADMIN — Medication 1 MILLIGRAM(S): at 18:49

## 2020-08-26 RX ADMIN — Medication 1 SPRAY(S): at 18:50

## 2020-08-26 RX ADMIN — Medication 12.5 MILLIGRAM(S): at 18:49

## 2020-08-26 RX ADMIN — LAMOTRIGINE 200 MILLIGRAM(S): 25 TABLET, ORALLY DISINTEGRATING ORAL at 18:49

## 2020-08-26 NOTE — ED BEHAVIORAL HEALTH ASSESSMENT NOTE - DESCRIPTION
Pt appears euthymic. No agitation or aggression since arrival.  He was not in physical distress.     Vital Signs Last 24 Hrs  T(C): 36.3 (26 Aug 2020 11:17), Max: 36.3 (26 Aug 2020 11:17)  T(F): 97.3 (26 Aug 2020 11:17), Max: 97.3 (26 Aug 2020 11:17)  HR: 87 (26 Aug 2020 11:17) (87 - 87)  BP: 102/63 (26 Aug 2020 11:17) (102/63 - 102/63)  BP(mean): --  RR: 16 (26 Aug 2020 11:17) (16 - 16)  SpO2: 97% (26 Aug 2020 11:17) (97% - 97%) HTN, CP, Asthma, GERD Pt lived with adoptive Aunt prior to group homes

## 2020-08-26 NOTE — ED ADULT NURSE NOTE - AS SC BRADEN MOBILITY
(4) no limitation
nontender/no rebound tenderness/no bruit/no distention/no masses palpable/bowel sounds normal/soft
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
## 2020-08-26 NOTE — ED CDU PROVIDER INITIAL DAY NOTE - ATTENDING CONTRIBUTION TO CARE
I, Dusty Schultz, performed the initial face to face bedside interview with this patient regarding history of present illness, review of symptoms and relevant past medical, social and family history.  I completed an independent physical examination.  I was the initial provider who evaluated this patient. I have signed out the follow up of any pending tests (i.e. labs, radiological studies) to the resident.  I have communicated the patient’s plan of care and disposition with the resident

## 2020-08-26 NOTE — ED BEHAVIORAL HEALTH ASSESSMENT NOTE - OTHER
Staff group home total of 7 residents and staff may be influenced negatively by another resident with frequent behaviors who is a new resident of home no h/o SA or SIB As per chart DR Hudson reports Pt will state he has AH but has never had them. chronically poor limited

## 2020-08-26 NOTE — ED ADULT TRIAGE NOTE - WEIGHT IN LBS
Complex Repair And Xenograft Text: The defect edges were debeveled with a #15 scalpel blade.  The primary defect was closed partially with a complex linear closure.  Given the location of the defect, shape of the defect and the proximity to free margins a xenograft was deemed most appropriate to repair the remaining defect.  The graft was trimmed to fit the size of the remaining defect.  The graft was then placed in the primary defect, oriented appropriately, and sutured into place. Mercedes Flap Text: The defect edges were debeveled with a #15 scalpel blade.  Given the location of the defect, shape of the defect and the proximity to free margins a Mercedes flap was deemed most appropriate.  Using a sterile surgical marker, an appropriate advancement flap was drawn incorporating the defect and placing the expected incisions within the relaxed skin tension lines where possible. The area thus outlined was incised deep to adipose tissue with a #15 scalpel blade.  The skin margins were undermined to an appropriate distance in all directions utilizing iris scissors. Trilobed Flap Text: The defect edges were debeveled with a #15 scalpel blade.  Given the location of the defect and the proximity to free margins a trilobed flap was deemed most appropriate.  Using a sterile surgical marker, an appropriate trilobed flap drawn around the defect.    The area thus outlined was incised deep to adipose tissue with a #15 scalpel blade.  The skin margins were undermined to an appropriate distance in all directions utilizing iris scissors. Show Repair Anesthesia Variables: Yes Melolabial Interpolation Flap Text: A decision was made to reconstruct the defect utilizing an interpolation axial flap and a staged reconstruction.  A telfa template was made of the defect.  This telfa template was then used to outline the melolabial interpolation flap.  The donor area for the pedicle flap was then injected with anesthesia.  The flap was excised through the skin and subcutaneous tissue down to the layer of the underlying musculature.  The pedicle flap was carefully excised within this deep plane to maintain its blood supply.  The edges of the donor site were undermined.   The donor site was closed in a primary fashion.  The pedicle was then rotated into position and sutured.  Once the tube was sutured into place, adequate blood supply was confirmed with blanching and refill.  The pedicle was then wrapped with xeroform gauze and dressed appropriately with a telfa and gauze bandage to ensure continued blood supply and protect the attached pedicle. Skin Substitute Units (Will Override Primary Defect Units If Greater Than 0): 0 Eliptical Excision Additional Text (Leave Blank If You Do Not Want): The margin was drawn around the clinically apparent lesion.  An elliptical shape was then drawn on the skin incorporating the lesion and margins.  Incisions were then made along these lines to the appropriate tissue plane and the lesion was extirpated. Complex Repair And A-T Advancement Flap Text: The defect edges were debeveled with a #15 scalpel blade.  The primary defect was closed partially with a complex linear closure.  Given the location of the remaining defect, shape of the defect and the proximity to free margins an A-T advancement flap was deemed most appropriate for complete closure of the defect.  Using a sterile surgical marker, an appropriate advancement flap was drawn incorporating the defect and placing the expected incisions within the relaxed skin tension lines where possible.    The area thus outlined was incised deep to adipose tissue with a #15 scalpel blade.  The skin margins were undermined to an appropriate distance in all directions utilizing iris scissors. Epidermal Closure Graft Donor Site (Optional): simple interrupted Bilobed Transposition Flap Text: The defect edges were debeveled with a #15 scalpel blade.  Given the location of the defect and the proximity to free margins a bilobed transposition flap was deemed most appropriate.  Using a sterile surgical marker, an appropriate bilobe flap drawn around the defect.    The area thus outlined was incised deep to adipose tissue with a #15 scalpel blade.  The skin margins were undermined to an appropriate distance in all directions utilizing iris scissors. Complex Repair And Tissue Cultured Epidermal Autograft Text: The defect edges were debeveled with a #15 scalpel blade.  The primary defect was closed partially with a complex linear closure.  Given the location of the defect, shape of the defect and the proximity to free margins an tissue cultured epidermal autograft was deemed most appropriate to repair the remaining defect.  The graft was trimmed to fit the size of the remaining defect.  The graft was then placed in the primary defect, oriented appropriately, and sutured into place. Mastoid Interpolation Flap Text: A decision was made to reconstruct the defect utilizing an interpolation axial flap and a staged reconstruction.  A telfa template was made of the defect.  This telfa template was then used to outline the mastoid interpolation flap.  The donor area for the pedicle flap was then injected with anesthesia.  The flap was excised through the skin and subcutaneous tissue down to the layer of the underlying musculature.  The pedicle flap was carefully excised within this deep plane to maintain its blood supply.  The edges of the donor site were undermined.   The donor site was closed in a primary fashion.  The pedicle was then rotated into position and sutured.  Once the tube was sutured into place, adequate blood supply was confirmed with blanching and refill.  The pedicle was then wrapped with xeroform gauze and dressed appropriately with a telfa and gauze bandage to ensure continued blood supply and protect the attached pedicle. Complex Repair And O-T Advancement Flap Text: The defect edges were debeveled with a #15 scalpel blade.  The primary defect was closed partially with a complex linear closure.  Given the location of the remaining defect, shape of the defect and the proximity to free margins an O-T advancement flap was deemed most appropriate for complete closure of the defect.  Using a sterile surgical marker, an appropriate advancement flap was drawn incorporating the defect and placing the expected incisions within the relaxed skin tension lines where possible.    The area thus outlined was incised deep to adipose tissue with a #15 scalpel blade.  The skin margins were undermined to an appropriate distance in all directions utilizing iris scissors. Modified Advancement Flap Text: The defect edges were debeveled with a #15 scalpel blade.  Given the location of the defect, shape of the defect and the proximity to free margins a modified advancement flap was deemed most appropriate.  Using a sterile surgical marker, an appropriate advancement flap was drawn incorporating the defect and placing the expected incisions within the relaxed skin tension lines where possible.    The area thus outlined was incised deep to adipose tissue with a #15 scalpel blade.  The skin margins were undermined to an appropriate distance in all directions utilizing iris scissors. Size Of Lesion In Cm: 1.4 Complex Repair And Skin Substitute Graft Text: The defect edges were debeveled with a #15 scalpel blade.  The primary defect was closed partially with a complex linear closure.  Given the location of the remaining defect, shape of the defect and the proximity to free margins a skin substitute graft was deemed most appropriate to repair the remaining defect.  The graft was trimmed to fit the size of the remaining defect.  The graft was then placed in the primary defect, oriented appropriately, and sutured into place. Medical Necessity Information: It is in your best interest to select a reason for this procedure from the list below. All of these items fulfill various CMS LCD requirements except lesion extends to a margin. Dorsal Nasal Flap Text: The defect edges were debeveled with a #15 scalpel blade.  Given the location of the defect and the proximity to free margins a dorsal nasal flap was deemed most appropriate.  Using a sterile surgical marker, an appropriate dorsal nasal flap was drawn around the defect.    The area thus outlined was incised deep to adipose tissue with a #15 scalpel blade.  The skin margins were undermined to an appropriate distance in all directions utilizing iris scissors. Consent was obtained from the patient. The risks and benefits to therapy were discussed in detail. Specifically, the risks of infection, scarring, bleeding, prolonged wound healing, incomplete removal, allergy to anesthesia, nerve injury and recurrence were addressed. Prior to the procedure, the treatment site was clearly identified and confirmed by the patient. All components of Universal Protocol/PAUSE Rule completed. Validate That Anesthesia Volume Is Not Zero (If You Leave At 0 It Will Not Render In Note): No Saucerization Excision Additional Text (Leave Blank If You Do Not Want): The margin was drawn around the clinically apparent lesion.  Incisions were then made along these lines, in a tangential fashion, to the appropriate tissue plane and the lesion was extirpated. 110 Hemostasis: Electrocautery Number Of Deep Sutures (Optional): 3 Island Pedicle Flap Text: The defect edges were debeveled with a #15 scalpel blade.  Given the location of the defect, shape of the defect and the proximity to free margins an island pedicle advancement flap was deemed most appropriate.  Using a sterile surgical marker, an appropriate advancement flap was drawn incorporating the defect, outlining the appropriate donor tissue and placing the expected incisions within the relaxed skin tension lines where possible.    The area thus outlined was incised deep to adipose tissue with a #15 scalpel blade.  The skin margins were undermined to an appropriate distance in all directions around the primary defect and laterally outward around the island pedicle utilizing iris scissors.  There was minimal undermining beneath the pedicle flap. Posterior Auricular Interpolation Flap Text: A decision was made to reconstruct the defect utilizing an interpolation axial flap and a staged reconstruction.  A telfa template was made of the defect.  This telfa template was then used to outline the posterior auricular interpolation flap.  The donor area for the pedicle flap was then injected with anesthesia.  The flap was excised through the skin and subcutaneous tissue down to the layer of the underlying musculature.  The pedicle flap was carefully excised within this deep plane to maintain its blood supply.  The edges of the donor site were undermined.   The donor site was closed in a primary fashion.  The pedicle was then rotated into position and sutured.  Once the tube was sutured into place, adequate blood supply was confirmed with blanching and refill.  The pedicle was then wrapped with xeroform gauze and dressed appropriately with a telfa and gauze bandage to ensure continued blood supply and protect the attached pedicle. Slit Excision Additional Text (Leave Blank If You Do Not Want): A linear line was drawn on the skin overlying the lesion. An incision was made slowly until the lesion was visualized.  Once visualized, the lesion was removed with blunt dissection. Where Do You Want The Question To Include Opioid Counseling Located?: Case Summary Tab Graft Donor Site Bandage (Optional-Leave Blank If You Don't Want In Note): Steri-strips and a pressure bandage were applied to the donor site. Mucosal Advancement Flap Text: Given the location of the defect, shape of the defect and the proximity to free margins a mucosal advancement flap was deemed most appropriate. Incisions were made with a 15 blade scalpel in the appropriate fashion along the cutaneous vermillion border and the mucosal lip. The remaining actinically damaged mucosal tissue was excised.  The mucosal advancement flap was then elevated to the gingival sulcus with care taken to preserve the neurovascular structures and advanced into the primary defect. Care was taken to ensure that precise realignment of the vermilion border was achieved. Complex Repair And O-L Flap Text: The defect edges were debeveled with a #15 scalpel blade.  The primary defect was closed partially with a complex linear closure.  Given the location of the remaining defect, shape of the defect and the proximity to free margins an O-L flap was deemed most appropriate for complete closure of the defect.  Using a sterile surgical marker, an appropriate flap was drawn incorporating the defect and placing the expected incisions within the relaxed skin tension lines where possible.    The area thus outlined was incised deep to adipose tissue with a #15 scalpel blade.  The skin margins were undermined to an appropriate distance in all directions utilizing iris scissors. Post-Care Instructions: I reviewed with the patient in detail post-care instructions. Patient is not to engage in any heavy lifting, exercise, or swimming for the next 14 days. Should the patient develop any fevers, chills, bleeding, severe pain patient will contact the office immediately. Perilesional Excision Additional Text (Leave Blank If You Do Not Want): The margin was drawn around the clinically apparent lesion. Incisions were then made along these lines to the appropriate tissue plane and the lesion was extirpated. Billing Type: Third-Party Bill Island Pedicle Flap With Canthal Suspension Text: The defect edges were debeveled with a #15 scalpel blade.  Given the location of the defect, shape of the defect and the proximity to free margins an island pedicle advancement flap was deemed most appropriate.  Using a sterile surgical marker, an appropriate advancement flap was drawn incorporating the defect, outlining the appropriate donor tissue and placing the expected incisions within the relaxed skin tension lines where possible. The area thus outlined was incised deep to adipose tissue with a #15 scalpel blade.  The skin margins were undermined to an appropriate distance in all directions around the primary defect and laterally outward around the island pedicle utilizing iris scissors.  There was minimal undermining beneath the pedicle flap. A suspension suture was placed in the canthal tendon to prevent tension and prevent ectropion. Information: Selecting Yes will display possible errors in your note based on the variables you have selected. This validation is only offered as a suggestion for you. PLEASE NOTE THAT THE VALIDATION TEXT WILL BE REMOVED WHEN YOU FINALIZE YOUR NOTE. IF YOU WANT TO FAX A PRELIMINARY NOTE YOU WILL NEED TO TOGGLE THIS TO 'NO' IF YOU DO NOT WANT IT IN YOUR FAXED NOTE. Estimated Blood Loss (Cc): minimal Paramedian Forehead Flap Text: A decision was made to reconstruct the defect utilizing an interpolation axial flap and a staged reconstruction.  A telfa template was made of the defect.  This telfa template was then used to outline the paramedian forehead pedicle flap.  The donor area for the pedicle flap was then injected with anesthesia.  The flap was excised through the skin and subcutaneous tissue down to the layer of the underlying musculature.  The pedicle flap was carefully excised within this deep plane to maintain its blood supply.  The edges of the donor site were undermined.   The donor site was closed in a primary fashion.  The pedicle was then rotated into position and sutured.  Once the tube was sutured into place, adequate blood supply was confirmed with blanching and refill.  The pedicle was then wrapped with xeroform gauze and dressed appropriately with a telfa and gauze bandage to ensure continued blood supply and protect the attached pedicle. Detail Level: Detailed Excisional Biopsy Additional Text (Leave Blank If You Do Not Want): The margin was drawn around the clinically apparent lesion. An elliptical shape was then drawn on the skin incorporating the lesion and margins.  Incisions were then made along these lines to the appropriate tissue plane and the lesion was extirpated. Size Of Margin In Cm: 0.3 Complex Repair And Bilobe Flap Text: The defect edges were debeveled with a #15 scalpel blade.  The primary defect was closed partially with a complex linear closure.  Given the location of the remaining defect, shape of the defect and the proximity to free margins a bilobe flap was deemed most appropriate for complete closure of the defect.  Using a sterile surgical marker, an appropriate advancement flap was drawn incorporating the defect and placing the expected incisions within the relaxed skin tension lines where possible.    The area thus outlined was incised deep to adipose tissue with a #15 scalpel blade.  The skin margins were undermined to an appropriate distance in all directions utilizing iris scissors. Hatchet Flap Text: The defect edges were debeveled with a #15 scalpel blade.  Given the location of the defect, shape of the defect and the proximity to free margins a hatchet flap was deemed most appropriate.  Using a sterile surgical marker, an appropriate hatchet flap was drawn incorporating the defect and placing the expected incisions within the relaxed skin tension lines where possible.    The area thus outlined was incised deep to adipose tissue with a #15 scalpel blade.  The skin margins were undermined to an appropriate distance in all directions utilizing iris scissors. Excision Depth: adipose tissue Epidermal Sutures: 4-0 Nylon Spiral Flap Text: The defect edges were debeveled with a #15 scalpel blade.  Given the location of the defect, shape of the defect and the proximity to free margins a spiral flap was deemed most appropriate.  Using a sterile surgical marker, an appropriate rotation flap was drawn incorporating the defect and placing the expected incisions within the relaxed skin tension lines where possible. The area thus outlined was incised deep to adipose tissue with a #15 scalpel blade.  The skin margins were undermined to an appropriate distance in all directions utilizing iris scissors. Suture Removal: 14 days Lip Wedge Excision Repair Text: Given the location of the defect and the proximity to free margins a full thickness wedge repair was deemed most appropriate.  Using a sterile surgical marker, the appropriate repair was drawn incorporating the defect and placing the expected incisions perpendicular to the vermilion border.  The vermilion border was also meticulously outlined to ensure appropriate reapproximation during the repair.  The area thus outlined was incised through and through with a #15 scalpel blade.  The muscularis and dermis were reaproximated with deep sutures following hemostasis. Care was taken to realign the vermilion border before proceeding with the superficial closure.  Once the vermilion was realigned the superfical and mucosal closure was finished. Complex Repair And Melolabial Flap Text: The defect edges were debeveled with a #15 scalpel blade.  The primary defect was closed partially with a complex linear closure.  Given the location of the remaining defect, shape of the defect and the proximity to free margins a melolabial flap was deemed most appropriate for complete closure of the defect.  Using a sterile surgical marker, an appropriate advancement flap was drawn incorporating the defect and placing the expected incisions within the relaxed skin tension lines where possible.    The area thus outlined was incised deep to adipose tissue with a #15 scalpel blade.  The skin margins were undermined to an appropriate distance in all directions utilizing iris scissors. Rotation Flap Text: The defect edges were debeveled with a #15 scalpel blade.  Given the location of the defect, shape of the defect and the proximity to free margins a rotation flap was deemed most appropriate.  Using a sterile surgical marker, an appropriate rotation flap was drawn incorporating the defect and placing the expected incisions within the relaxed skin tension lines where possible.    The area thus outlined was incised deep to adipose tissue with a #15 scalpel blade.  The skin margins were undermined to an appropriate distance in all directions utilizing iris scissors. Home Suture Removal Text: Patient was provided a home suture removal kit and will remove their sutures at home.  If they have any questions or difficulties they will call the office. Alar Island Pedicle Flap Text: The defect edges were debeveled with a #15 scalpel blade.  Given the location of the defect, shape of the defect and the proximity to the alar rim an island pedicle advancement flap was deemed most appropriate.  Using a sterile surgical marker, an appropriate advancement flap was drawn incorporating the defect, outlining the appropriate donor tissue and placing the expected incisions within the nasal ala running parallel to the alar rim. The area thus outlined was incised with a #15 scalpel blade.  The skin margins were undermined minimally to an appropriate distance in all directions around the primary defect and laterally outward around the island pedicle utilizing iris scissors.  There was minimal undermining beneath the pedicle flap. Island Pedicle Flap-Requiring Vessel Identification Text: The defect edges were debeveled with a #15 scalpel blade.  Given the location of the defect, shape of the defect and the proximity to free margins an island pedicle advancement flap was deemed most appropriate.  Using a sterile surgical marker, an appropriate advancement flap was drawn, based on the axial vessel mentioned above, incorporating the defect, outlining the appropriate donor tissue and placing the expected incisions within the relaxed skin tension lines where possible.    The area thus outlined was incised deep to adipose tissue with a #15 scalpel blade.  The skin margins were undermined to an appropriate distance in all directions around the primary defect and laterally outward around the island pedicle utilizing iris scissors.  There was minimal undermining beneath the pedicle flap. Split-Thickness Skin Graft Text: The defect edges were debeveled with a #15 scalpel blade.  Given the location of the defect, shape of the defect and the proximity to free margins a split thickness skin graft was deemed most appropriate.  Using a sterile surgical marker, the primary defect shape was transferred to the donor site. The split thickness graft was then harvested.  The skin graft was then placed in the primary defect and oriented appropriately. Repair Performed By Another Provider Text (Leave Blank If You Do Not Want): After the tissue was excised the defect was repaired by another provider. Star Wedge Flap Text: The defect edges were debeveled with a #15 scalpel blade.  Given the location of the defect, shape of the defect and the proximity to free margins a star wedge flap was deemed most appropriate.  Using a sterile surgical marker, an appropriate rotation flap was drawn incorporating the defect and placing the expected incisions within the relaxed skin tension lines where possible. The area thus outlined was incised deep to adipose tissue with a #15 scalpel blade.  The skin margins were undermined to an appropriate distance in all directions utilizing iris scissors. Complex Repair And Rotation Flap Text: The defect edges were debeveled with a #15 scalpel blade.  The primary defect was closed partially with a complex linear closure.  Given the location of the remaining defect, shape of the defect and the proximity to free margins a rotation flap was deemed most appropriate for complete closure of the defect.  Using a sterile surgical marker, an appropriate advancement flap was drawn incorporating the defect and placing the expected incisions within the relaxed skin tension lines where possible.    The area thus outlined was incised deep to adipose tissue with a #15 scalpel blade.  The skin margins were undermined to an appropriate distance in all directions utilizing iris scissors. Excision Method: Fusiform Double Island Pedicle Flap Text: The defect edges were debeveled with a #15 scalpel blade.  Given the location of the defect, shape of the defect and the proximity to free margins a double island pedicle advancement flap was deemed most appropriate.  Using a sterile surgical marker, an appropriate advancement flap was drawn incorporating the defect, outlining the appropriate donor tissue and placing the expected incisions within the relaxed skin tension lines where possible.    The area thus outlined was incised deep to adipose tissue with a #15 scalpel blade.  The skin margins were undermined to an appropriate distance in all directions around the primary defect and laterally outward around the island pedicle utilizing iris scissors.  There was minimal undermining beneath the pedicle flap. Ftsg Text: The defect edges were debeveled with a #15 scalpel blade.  Given the location of the defect, shape of the defect and the proximity to free margins a full thickness skin graft was deemed most appropriate.  Using a sterile surgical marker, the primary defect shape was transferred to the donor site. The area thus outlined was incised deep to adipose tissue with a #15 scalpel blade.  The harvested graft was then trimmed of adipose tissue until only dermis and epidermis was left.  The skin margins of the secondary defect were undermined to an appropriate distance in all directions utilizing iris scissors.  The secondary defect was closed with interrupted buried subcutaneous sutures.  The skin edges were then re-apposed with running  sutures.  The skin graft was then placed in the primary defect and oriented appropriately. Medical Necessity Clause: This procedure was medically necessary because the lesion that was treated was: Cartilage Graft Text: The defect edges were debeveled with a #15 scalpel blade.  Given the location of the defect, shape of the defect, the fact the defect involved a full thickness cartilage defect a cartilage graft was deemed most appropriate.  An appropriate donor site was identified, cleansed, and anesthetized. The cartilage graft was then harvested and transferred to the recipient site, oriented appropriately and then sutured into place.  The secondary defect was then repaired using a primary closure. Complex Repair And Transposition Flap Text: The defect edges were debeveled with a #15 scalpel blade.  The primary defect was closed partially with a complex linear closure.  Given the location of the remaining defect, shape of the defect and the proximity to free margins a transposition flap was deemed most appropriate for complete closure of the defect.  Using a sterile surgical marker, an appropriate advancement flap was drawn incorporating the defect and placing the expected incisions within the relaxed skin tension lines where possible.    The area thus outlined was incised deep to adipose tissue with a #15 scalpel blade.  The skin margins were undermined to an appropriate distance in all directions utilizing iris scissors. Transposition Flap Text: The defect edges were debeveled with a #15 scalpel blade.  Given the location of the defect and the proximity to free margins a transposition flap was deemed most appropriate.  Using a sterile surgical marker, an appropriate transposition flap was drawn incorporating the defect.    The area thus outlined was incised deep to adipose tissue with a #15 scalpel blade.  The skin margins were undermined to an appropriate distance in all directions utilizing iris scissors. Keystone Flap Text: The defect edges were debeveled with a #15 scalpel blade.  Given the location of the defect, shape of the defect a keystone flap was deemed most appropriate.  Using a sterile surgical marker, an appropriate keystone flap was drawn incorporating the defect, outlining the appropriate donor tissue and placing the expected incisions within the relaxed skin tension lines where possible. The area thus outlined was incised deep to adipose tissue with a #15 scalpel blade.  The skin margins were undermined to an appropriate distance in all directions around the primary defect and laterally outward around the flap utilizing iris scissors. No Repair - Repaired With Adjacent Surgical Defect Text (Leave Blank If You Do Not Want): After the excision the defect was repaired concurrently with another surgical defect which was in close approximation. Complex Repair And Rhombic Flap Text: The defect edges were debeveled with a #15 scalpel blade.  The primary defect was closed partially with a complex linear closure.  Given the location of the remaining defect, shape of the defect and the proximity to free margins a rhombic flap was deemed most appropriate for complete closure of the defect.  Using a sterile surgical marker, an appropriate advancement flap was drawn incorporating the defect and placing the expected incisions within the relaxed skin tension lines where possible.    The area thus outlined was incised deep to adipose tissue with a #15 scalpel blade.  The skin margins were undermined to an appropriate distance in all directions utilizing iris scissors. Scalpel Size: 15 blade Complex Repair And V-Y Plasty Text: The defect edges were debeveled with a #15 scalpel blade.  The primary defect was closed partially with a complex linear closure.  Given the location of the remaining defect, shape of the defect and the proximity to free margins a V-Y plasty was deemed most appropriate for complete closure of the defect.  Using a sterile surgical marker, an appropriate advancement flap was drawn incorporating the defect and placing the expected incisions within the relaxed skin tension lines where possible.    The area thus outlined was incised deep to adipose tissue with a #15 scalpel blade.  The skin margins were undermined to an appropriate distance in all directions utilizing iris scissors. Intermediate Repair Preamble Text (Leave Blank If You Do Not Want): Undermining was performed with blunt dissection. O-T Plasty Text: The defect edges were debeveled with a #15 scalpel blade.  Given the location of the defect, shape of the defect and the proximity to free margins an O-T plasty was deemed most appropriate.  Using a sterile surgical marker, an appropriate O-T plasty was drawn incorporating the defect and placing the expected incisions within the relaxed skin tension lines where possible.    The area thus outlined was incised deep to adipose tissue with a #15 scalpel blade.  The skin margins were undermined to an appropriate distance in all directions utilizing iris scissors. Composite Graft Text: The defect edges were debeveled with a #15 scalpel blade.  Given the location of the defect, shape of the defect, the proximity to free margins and the fact the defect was full thickness a composite graft was deemed most appropriate.  The defect was outline and then transferred to the donor site.  A full thickness graft was then excised from the donor site. The graft was then placed in the primary defect, oriented appropriately and then sutured into place.  The secondary defect was then repaired using a primary closure. Advancement Flap (Single) Text: The defect edges were debeveled with a #15 scalpel blade.  Given the location of the defect and the proximity to free margins a single advancement flap was deemed most appropriate.  Using a sterile surgical marker, an appropriate advancement flap was drawn incorporating the defect and placing the expected incisions within the relaxed skin tension lines where possible.    The area thus outlined was incised deep to adipose tissue with a #15 scalpel blade.  The skin margins were undermined to an appropriate distance in all directions utilizing iris scissors. Muscle Hinge Flap Text: The defect edges were debeveled with a #15 scalpel blade.  Given the size, depth and location of the defect and the proximity to free margins a muscle hinge flap was deemed most appropriate.  Using a sterile surgical marker, an appropriate hinge flap was drawn incorporating the defect. The area thus outlined was incised with a #15 scalpel blade.  The skin margins were undermined to an appropriate distance in all directions utilizing iris scissors. Lab: [id number] Complex Repair Preamble Text (Leave Blank If You Do Not Want): Extensive wide undermining was performed. Burow's Advancement Flap Text: The defect edges were debeveled with a #15 scalpel blade.  Given the location of the defect and the proximity to free margins a Burow's advancement flap was deemed most appropriate.  Using a sterile surgical marker, the appropriate advancement flap was drawn incorporating the defect and placing the expected incisions within the relaxed skin tension lines where possible.    The area thus outlined was incised deep to adipose tissue with a #15 scalpel blade.  The skin margins were undermined to an appropriate distance in all directions utilizing iris scissors. Complex Repair And M Plasty Text: The defect edges were debeveled with a #15 scalpel blade.  The primary defect was closed partially with a complex linear closure.  Given the location of the remaining defect, shape of the defect and the proximity to free margins an M plasty was deemed most appropriate for complete closure of the defect.  Using a sterile surgical marker, an appropriate advancement flap was drawn incorporating the defect and placing the expected incisions within the relaxed skin tension lines where possible.    The area thus outlined was incised deep to adipose tissue with a #15 scalpel blade.  The skin margins were undermined to an appropriate distance in all directions utilizing iris scissors. O-Z Plasty Text: The defect edges were debeveled with a #15 scalpel blade.  Given the location of the defect, shape of the defect and the proximity to free margins an O-Z plasty (double transposition flap) was deemed most appropriate.  Using a sterile surgical marker, the appropriate transposition flaps were drawn incorporating the defect and placing the expected incisions within the relaxed skin tension lines where possible.    The area thus outlined was incised deep to adipose tissue with a #15 scalpel blade.  The skin margins were undermined to an appropriate distance in all directions utilizing iris scissors.  Hemostasis was achieved with electrocautery.  The flaps were then transposed into place, one clockwise and the other counterclockwise, and anchored with interrupted buried subcutaneous sutures. Epidermal Autograft Text: The defect edges were debeveled with a #15 scalpel blade.  Given the location of the defect, shape of the defect and the proximity to free margins an epidermal autograft was deemed most appropriate.  Using a sterile surgical marker, the primary defect shape was transferred to the donor site. The epidermal graft was then harvested.  The skin graft was then placed in the primary defect and oriented appropriately. Repair Type: Intermediate Advancement Flap (Double) Text: The defect edges were debeveled with a #15 scalpel blade.  Given the location of the defect and the proximity to free margins a double advancement flap was deemed most appropriate.  Using a sterile surgical marker, the appropriate advancement flaps were drawn incorporating the defect and placing the expected incisions within the relaxed skin tension lines where possible.    The area thus outlined was incised deep to adipose tissue with a #15 scalpel blade.  The skin margins were undermined to an appropriate distance in all directions utilizing iris scissors. Lab Facility: [id number] Number Of Epidermal Sutures (Optional): 6 Melolabial Transposition Flap Text: The defect edges were debeveled with a #15 scalpel blade.  Given the location of the defect and the proximity to free margins a melolabial flap was deemed most appropriate.  Using a sterile surgical marker, an appropriate melolabial transposition flap was drawn incorporating the defect.    The area thus outlined was incised deep to adipose tissue with a #15 scalpel blade.  The skin margins were undermined to an appropriate distance in all directions utilizing iris scissors. Rhomboid Transposition Flap Text: The defect edges were debeveled with a #15 scalpel blade.  Given the location of the defect and the proximity to free margins a rhomboid transposition flap was deemed most appropriate.  Using a sterile surgical marker, an appropriate rhomboid flap was drawn incorporating the defect.    The area thus outlined was incised deep to adipose tissue with a #15 scalpel blade.  The skin margins were undermined to an appropriate distance in all directions utilizing iris scissors. S Plasty Text: Given the location and shape of the defect, and the orientation of relaxed skin tension lines, an S-plasty was deemed most appropriate for repair.  Using a sterile surgical marker, the appropriate outline of the S-plasty was drawn, incorporating the defect and placing the expected incisions within the relaxed skin tension lines where possible.  The area thus outlined was incised deep to adipose tissue with a #15 scalpel blade.  The skin margins were undermined to an appropriate distance in all directions utilizing iris scissors. The skin flaps were advanced over the defect.  The opposing margins were then approximated with interrupted buried subcutaneous sutures. Chonodrocutaneous Helical Advancement Flap Text: The defect edges were debeveled with a #15 scalpel blade.  Given the location of the defect and the proximity to free margins a chondrocutaneous helical advancement flap was deemed most appropriate.  Using a sterile surgical marker, the appropriate advancement flap was drawn incorporating the defect and placing the expected incisions within the relaxed skin tension lines where possible.    The area thus outlined was incised deep to adipose tissue with a #15 scalpel blade.  The skin margins were undermined to an appropriate distance in all directions utilizing iris scissors. Anesthesia Type: 1% lidocaine with epinephrine Dermal Autograft Text: The defect edges were debeveled with a #15 scalpel blade.  Given the location of the defect, shape of the defect and the proximity to free margins a dermal autograft was deemed most appropriate.  Using a sterile surgical marker, the primary defect shape was transferred to the donor site. The area thus outlined was incised deep to adipose tissue with a #15 scalpel blade.  The harvested graft was then trimmed of adipose and epidermal tissue until only dermis was left.  The skin graft was then placed in the primary defect and oriented appropriately. Complex Repair And Double M Plasty Text: The defect edges were debeveled with a #15 scalpel blade.  The primary defect was closed partially with a complex linear closure.  Given the location of the remaining defect, shape of the defect and the proximity to free margins a double M plasty was deemed most appropriate for complete closure of the defect.  Using a sterile surgical marker, an appropriate advancement flap was drawn incorporating the defect and placing the expected incisions within the relaxed skin tension lines where possible.    The area thus outlined was incised deep to adipose tissue with a #15 scalpel blade.  The skin margins were undermined to an appropriate distance in all directions utilizing iris scissors. Rhombic Flap Text: The defect edges were debeveled with a #15 scalpel blade.  Given the location of the defect and the proximity to free margins a rhombic flap was deemed most appropriate.  Using a sterile surgical marker, an appropriate rhombic flap was drawn incorporating the defect.    The area thus outlined was incised deep to adipose tissue with a #15 scalpel blade.  The skin margins were undermined to an appropriate distance in all directions utilizing iris scissors. Double O-Z Plasty Text: The defect edges were debeveled with a #15 scalpel blade.  Given the location of the defect, shape of the defect and the proximity to free margins a Double O-Z plasty (double transposition flap) was deemed most appropriate.  Using a sterile surgical marker, the appropriate transposition flaps were drawn incorporating the defect and placing the expected incisions within the relaxed skin tension lines where possible. The area thus outlined was incised deep to adipose tissue with a #15 scalpel blade.  The skin margins were undermined to an appropriate distance in all directions utilizing iris scissors.  Hemostasis was achieved with electrocautery.  The flaps were then transposed into place, one clockwise and the other counterclockwise, and anchored with interrupted buried subcutaneous sutures. V-Y Plasty Text: The defect edges were debeveled with a #15 scalpel blade.  Given the location of the defect, shape of the defect and the proximity to free margins an V-Y advancement flap was deemed most appropriate.  Using a sterile surgical marker, an appropriate advancement flap was drawn incorporating the defect and placing the expected incisions within the relaxed skin tension lines where possible.    The area thus outlined was incised deep to adipose tissue with a #15 scalpel blade.  The skin margins were undermined to an appropriate distance in all directions utilizing iris scissors. Tissue Cultured Epidermal Autograft Text: The defect edges were debeveled with a #15 scalpel blade.  Given the location of the defect, shape of the defect and the proximity to free margins a tissue cultured epidermal autograft was deemed most appropriate.  The graft was then trimmed to fit the size of the defect.  The graft was then placed in the primary defect and oriented appropriately. Crescentic Advancement Flap Text: The defect edges were debeveled with a #15 scalpel blade.  Given the location of the defect and the proximity to free margins a crescentic advancement flap was deemed most appropriate.  Using a sterile surgical marker, the appropriate advancement flap was drawn incorporating the defect and placing the expected incisions within the relaxed skin tension lines where possible.    The area thus outlined was incised deep to adipose tissue with a #15 scalpel blade.  The skin margins were undermined to an appropriate distance in all directions utilizing iris scissors. Bi-Rhombic Flap Text: The defect edges were debeveled with a #15 scalpel blade.  Given the location of the defect and the proximity to free margins a bi-rhombic flap was deemed most appropriate.  Using a sterile surgical marker, an appropriate rhombic flap was drawn incorporating the defect. The area thus outlined was incised deep to adipose tissue with a #15 scalpel blade.  The skin margins were undermined to an appropriate distance in all directions utilizing iris scissors. Intermediate / Complex Repair - Final Wound Length In Cm: 3.3 Skin Substitute Text: The defect edges were debeveled with a #15 scalpel blade.  Given the location of the defect, shape of the defect and the proximity to free margins a skin substitute graft was deemed most appropriate.  The graft material was trimmed to fit the size of the defect. The graft was then placed in the primary defect and oriented appropriately. Complex Repair And W Plasty Text: The defect edges were debeveled with a #15 scalpel blade.  The primary defect was closed partially with a complex linear closure.  Given the location of the remaining defect, shape of the defect and the proximity to free margins a W plasty was deemed most appropriate for complete closure of the defect.  Using a sterile surgical marker, an appropriate advancement flap was drawn incorporating the defect and placing the expected incisions within the relaxed skin tension lines where possible.    The area thus outlined was incised deep to adipose tissue with a #15 scalpel blade.  The skin margins were undermined to an appropriate distance in all directions utilizing iris scissors. Xenograft Text: The defect edges were debeveled with a #15 scalpel blade.  Given the location of the defect, shape of the defect and the proximity to free margins a xenograft was deemed most appropriate.  The graft was then trimmed to fit the size of the defect.  The graft was then placed in the primary defect and oriented appropriately. Complex Repair And Dorsal Nasal Flap Text: The defect edges were debeveled with a #15 scalpel blade.  The primary defect was closed partially with a complex linear closure.  Given the location of the remaining defect, shape of the defect and the proximity to free margins a dorsal nasal flap was deemed most appropriate for complete closure of the defect.  Using a sterile surgical marker, an appropriate flap was drawn incorporating the defect and placing the expected incisions within the relaxed skin tension lines where possible.    The area thus outlined was incised deep to adipose tissue with a #15 scalpel blade.  The skin margins were undermined to an appropriate distance in all directions utilizing iris scissors. Helical Rim Advancement Flap Text: The defect edges were debeveled with a #15 blade scalpel.  Given the location of the defect and the proximity to free margins (helical rim) a double helical rim advancement flap was deemed most appropriate.  Using a sterile surgical marker, the appropriate advancement flaps were drawn incorporating the defect and placing the expected incisions between the helical rim and antihelix where possible.  The area thus outlined was incised through and through with a #15 scalpel blade.  With a skin hook and iris scissors, the flaps were gently and sharply undermined and freed up. H Plasty Text: Given the location of the defect, shape of the defect and the proximity to free margins a H-plasty was deemed most appropriate for repair.  Using a sterile surgical marker, the appropriate advancement arms of the H-plasty were drawn incorporating the defect and placing the expected incisions within the relaxed skin tension lines where possible. The area thus outlined was incised deep to adipose tissue with a #15 scalpel blade. The skin margins were undermined to an appropriate distance in all directions utilizing iris scissors.  The opposing advancement arms were then advanced into place in opposite direction and anchored with interrupted buried subcutaneous sutures. A-T Advancement Flap Text: The defect edges were debeveled with a #15 scalpel blade.  Given the location of the defect, shape of the defect and the proximity to free margins an A-T advancement flap was deemed most appropriate.  Using a sterile surgical marker, an appropriate advancement flap was drawn incorporating the defect and placing the expected incisions within the relaxed skin tension lines where possible.    The area thus outlined was incised deep to adipose tissue with a #15 scalpel blade.  The skin margins were undermined to an appropriate distance in all directions utilizing iris scissors. Complex Repair And Z Plasty Text: The defect edges were debeveled with a #15 scalpel blade.  The primary defect was closed partially with a complex linear closure.  Given the location of the remaining defect, shape of the defect and the proximity to free margins a Z plasty was deemed most appropriate for complete closure of the defect.  Using a sterile surgical marker, an appropriate advancement flap was drawn incorporating the defect and placing the expected incisions within the relaxed skin tension lines where possible.    The area thus outlined was incised deep to adipose tissue with a #15 scalpel blade.  The skin margins were undermined to an appropriate distance in all directions utilizing iris scissors. Purse String (Intermediate) Text: Given the location of the defect and the characteristics of the surrounding skin a purse string intermediate closure was deemed most appropriate.  Undermining was performed circumferentially around the surgical defect.  A purse string suture was then placed and tightened. Complex Repair And Ftsg Text: The defect edges were debeveled with a #15 scalpel blade.  The primary defect was closed partially with a complex linear closure.  Given the location of the defect, shape of the defect and the proximity to free margins a full thickness skin graft was deemed most appropriate to repair the remaining defect.  The graft was trimmed to fit the size of the remaining defect.  The graft was then placed in the primary defect, oriented appropriately, and sutured into place. Bilateral Helical Rim Advancement Flap Text: The defect edges were debeveled with a #15 blade scalpel.  Given the location of the defect and the proximity to free margins (helical rim) a bilateral helical rim advancement flap was deemed most appropriate.  Using a sterile surgical marker, the appropriate advancement flaps were drawn incorporating the defect and placing the expected incisions between the helical rim and antihelix where possible.  The area thus outlined was incised through and through with a #15 scalpel blade.  With a skin hook and iris scissors, the flaps were gently and sharply undermined and freed up. W Plasty Text: The lesion was extirpated to the level of the fat with a #15 scalpel blade.  Given the location of the defect, shape of the defect and the proximity to free margins a W-plasty was deemed most appropriate for repair.  Using a sterile surgical marker, the appropriate transposition arms of the W-plasty were drawn incorporating the defect and placing the expected incisions within the relaxed skin tension lines where possible.    The area thus outlined was incised deep to adipose tissue with a #15 scalpel blade.  The skin margins were undermined to an appropriate distance in all directions utilizing iris scissors.  The opposing transposition arms were then transposed into place in opposite direction and anchored with interrupted buried subcutaneous sutures. O-T Advancement Flap Text: The defect edges were debeveled with a #15 scalpel blade.  Given the location of the defect, shape of the defect and the proximity to free margins an O-T advancement flap was deemed most appropriate.  Using a sterile surgical marker, an appropriate advancement flap was drawn incorporating the defect and placing the expected incisions within the relaxed skin tension lines where possible.    The area thus outlined was incised deep to adipose tissue with a #15 scalpel blade.  The skin margins were undermined to an appropriate distance in all directions utilizing iris scissors. O-Z Flap Text: The defect edges were debeveled with a #15 scalpel blade.  Given the location of the defect, shape of the defect and the proximity to free margins an O-Z flap was deemed most appropriate.  Using a sterile surgical marker, an appropriate transposition flap was drawn incorporating the defect and placing the expected incisions within the relaxed skin tension lines where possible. The area thus outlined was incised deep to adipose tissue with a #15 scalpel blade.  The skin margins were undermined to an appropriate distance in all directions utilizing iris scissors. Complex Repair And Split-Thickness Skin Graft Text: The defect edges were debeveled with a #15 scalpel blade.  The primary defect was closed partially with a complex linear closure.  Given the location of the defect, shape of the defect and the proximity to free margins a split thickness skin graft was deemed most appropriate to repair the remaining defect.  The graft was trimmed to fit the size of the remaining defect.  The graft was then placed in the primary defect, oriented appropriately, and sutured into place. Deep Sutures: 4-0 Vicryl Z Plasty Text: The lesion was extirpated to the level of the fat with a #15 scalpel blade.  Given the location of the defect, shape of the defect and the proximity to free margins a Z-plasty was deemed most appropriate for repair.  Using a sterile surgical marker, the appropriate transposition arms of the Z-plasty were drawn incorporating the defect and placing the expected incisions within the relaxed skin tension lines where possible.    The area thus outlined was incised deep to adipose tissue with a #15 scalpel blade.  The skin margins were undermined to an appropriate distance in all directions utilizing iris scissors.  The opposing transposition arms were then transposed into place in opposite direction and anchored with interrupted buried subcutaneous sutures. Purse String (Simple) Text: Given the location of the defect and the characteristics of the surrounding skin a purse string simple closure was deemed most appropriate.  Undermining was performed circumferentially around the surgical defect.  A purse string suture was then placed and tightened. O-L Flap Text: The defect edges were debeveled with a #15 scalpel blade.  Given the location of the defect, shape of the defect and the proximity to free margins an O-L flap was deemed most appropriate.  Using a sterile surgical marker, an appropriate advancement flap was drawn incorporating the defect and placing the expected incisions within the relaxed skin tension lines where possible.    The area thus outlined was incised deep to adipose tissue with a #15 scalpel blade.  The skin margins were undermined to an appropriate distance in all directions utilizing iris scissors. Ear Star Wedge Flap Text: The defect edges were debeveled with a #15 blade scalpel.  Given the location of the defect and the proximity to free margins (helical rim) an ear star wedge flap was deemed most appropriate.  Using a sterile surgical marker, the appropriate flap was drawn incorporating the defect and placing the expected incisions between the helical rim and antihelix where possible.  The area thus outlined was incised through and through with a #15 scalpel blade. Wound Care: Petrolatum Path Notes (To The Dermatopathologist): Please check margins. Dressing: dry sterile dressing Cheek-To-Nose Interpolation Flap Text: A decision was made to reconstruct the defect utilizing an interpolation axial flap and a staged reconstruction.  A telfa template was made of the defect.  This telfa template was then used to outline the Cheek-To-Nose Interpolation flap.  The donor area for the pedicle flap was then injected with anesthesia.  The flap was excised through the skin and subcutaneous tissue down to the layer of the underlying musculature.  The interpolation flap was carefully excised within this deep plane to maintain its blood supply.  The edges of the donor site were undermined.   The donor site was closed in a primary fashion.  The pedicle was then rotated into position and sutured.  Once the tube was sutured into place, adequate blood supply was confirmed with blanching and refill.  The pedicle was then wrapped with xeroform gauze and dressed appropriately with a telfa and gauze bandage to ensure continued blood supply and protect the attached pedicle. Complex Repair And Single Advancement Flap Text: The defect edges were debeveled with a #15 scalpel blade.  The primary defect was closed partially with a complex linear closure.  Given the location of the remaining defect, shape of the defect and the proximity to free margins a single advancement flap was deemed most appropriate for complete closure of the defect.  Using a sterile surgical marker, an appropriate advancement flap was drawn incorporating the defect and placing the expected incisions within the relaxed skin tension lines where possible.    The area thus outlined was incised deep to adipose tissue with a #15 scalpel blade.  The skin margins were undermined to an appropriate distance in all directions utilizing iris scissors. Complex Repair And Epidermal Autograft Text: The defect edges were debeveled with a #15 scalpel blade.  The primary defect was closed partially with a complex linear closure.  Given the location of the defect, shape of the defect and the proximity to free margins an epidermal autograft was deemed most appropriate to repair the remaining defect.  The graft was trimmed to fit the size of the remaining defect.  The graft was then placed in the primary defect, oriented appropriately, and sutured into place. Banner Transposition Flap Text: The defect edges were debeveled with a #15 scalpel blade.  Given the location of the defect and the proximity to free margins a Banner transposition flap was deemed most appropriate.  Using a sterile surgical marker, an appropriate flap drawn around the defect. The area thus outlined was incised deep to adipose tissue with a #15 scalpel blade.  The skin margins were undermined to an appropriate distance in all directions utilizing iris scissors. Cheek Interpolation Flap Text: A decision was made to reconstruct the defect utilizing an interpolation axial flap and a staged reconstruction.  A telfa template was made of the defect.  This telfa template was then used to outline the Cheek Interpolation flap.  The donor area for the pedicle flap was then injected with anesthesia.  The flap was excised through the skin and subcutaneous tissue down to the layer of the underlying musculature.  The interpolation flap was carefully excised within this deep plane to maintain its blood supply.  The edges of the donor site were undermined.   The donor site was closed in a primary fashion.  The pedicle was then rotated into position and sutured.  Once the tube was sutured into place, adequate blood supply was confirmed with blanching and refill.  The pedicle was then wrapped with xeroform gauze and dressed appropriately with a telfa and gauze bandage to ensure continued blood supply and protect the attached pedicle. Fusiform Excision Additional Text (Leave Blank If You Do Not Want): The margin was drawn around the clinically apparent lesion.  A fusiform shape was then drawn on the skin incorporating the lesion and margins.  Incisions were then made along these lines to the appropriate tissue plane and the lesion was extirpated. Interpolation Flap Text: A decision was made to reconstruct the defect utilizing an interpolation axial flap and a staged reconstruction.  A telfa template was made of the defect.  This telfa template was then used to outline the interpolation flap.  The donor area for the pedicle flap was then injected with anesthesia.  The flap was excised through the skin and subcutaneous tissue down to the layer of the underlying musculature.  The interpolation flap was carefully excised within this deep plane to maintain its blood supply.  The edges of the donor site were undermined.   The donor site was closed in a primary fashion.  The pedicle was then rotated into position and sutured.  Once the tube was sutured into place, adequate blood supply was confirmed with blanching and refill.  The pedicle was then wrapped with xeroform gauze and dressed appropriately with a telfa and gauze bandage to ensure continued blood supply and protect the attached pedicle. V-Y Flap Text: The defect edges were debeveled with a #15 scalpel blade.  Given the location of the defect, shape of the defect and the proximity to free margins a V-Y flap was deemed most appropriate.  Using a sterile surgical marker, an appropriate advancement flap was drawn incorporating the defect and placing the expected incisions within the relaxed skin tension lines where possible.    The area thus outlined was incised deep to adipose tissue with a #15 scalpel blade.  The skin margins were undermined to an appropriate distance in all directions utilizing iris scissors. Complex Repair And Modified Advancement Flap Text: The defect edges were debeveled with a #15 scalpel blade.  The primary defect was closed partially with a complex linear closure.  Given the location of the remaining defect, shape of the defect and the proximity to free margins a modified advancement flap was deemed most appropriate for complete closure of the defect.  Using a sterile surgical marker, an appropriate advancement flap was drawn incorporating the defect and placing the expected incisions within the relaxed skin tension lines where possible.    The area thus outlined was incised deep to adipose tissue with a #15 scalpel blade.  The skin margins were undermined to an appropriate distance in all directions utilizing iris scissors. Complex Repair And Double Advancement Flap Text: The defect edges were debeveled with a #15 scalpel blade.  The primary defect was closed partially with a complex linear closure.  Given the location of the remaining defect, shape of the defect and the proximity to free margins a double advancement flap was deemed most appropriate for complete closure of the defect.  Using a sterile surgical marker, an appropriate advancement flap was drawn incorporating the defect and placing the expected incisions within the relaxed skin tension lines where possible.    The area thus outlined was incised deep to adipose tissue with a #15 scalpel blade.  The skin margins were undermined to an appropriate distance in all directions utilizing iris scissors. Double O-Z Flap Text: The defect edges were debeveled with a #15 scalpel blade.  Given the location of the defect, shape of the defect and the proximity to free margins a Double O-Z flap was deemed most appropriate.  Using a sterile surgical marker, an appropriate transposition flap was drawn incorporating the defect and placing the expected incisions within the relaxed skin tension lines where possible. The area thus outlined was incised deep to adipose tissue with a #15 scalpel blade.  The skin margins were undermined to an appropriate distance in all directions utilizing iris scissors. Complex Repair And Dermal Autograft Text: The defect edges were debeveled with a #15 scalpel blade.  The primary defect was closed partially with a complex linear closure.  Given the location of the defect, shape of the defect and the proximity to free margins an dermal autograft was deemed most appropriate to repair the remaining defect.  The graft was trimmed to fit the size of the remaining defect.  The graft was then placed in the primary defect, oriented appropriately, and sutured into place. Bilobed Flap Text: The defect edges were debeveled with a #15 scalpel blade.  Given the location of the defect and the proximity to free margins a bilobe flap was deemed most appropriate.  Using a sterile surgical marker, an appropriate bilobe flap drawn around the defect.    The area thus outlined was incised deep to adipose tissue with a #15 scalpel blade.  The skin margins were undermined to an appropriate distance in all directions utilizing iris scissors.
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
## 2020-08-26 NOTE — ED PROVIDER NOTE - PHYSICAL EXAMINATION
General: well appearing, hyper-interactive, cooperative, well nourished, NAD  HEENT: pupils equal and reactive, normal external ears bilaterally   Cardiac: RRR, no MRG appreciated  Resp: lungs clear to auscultation bilaterally, symmetric chest wall rise  Abd: soft, nontender, nondistended,   : no CVA tenderness  Neuro: Moving all extremities  Skin:  normal color for race
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
## 2020-08-26 NOTE — ED ADULT NURSE NOTE - HPI (INCLUDE ILLNESS QUALITY, SEVERITY, DURATION, TIMING, CONTEXT, MODIFYING FACTORS, ASSOCIATED SIGNS AND SYMPTOMS)
pt was screened by security for contraband and per [facility] policies. pt was medically cleared by the ed attending.
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
## 2020-08-26 NOTE — ED BEHAVIORAL HEALTH ASSESSMENT NOTE - RISK ASSESSMENT
Low Acute Suicide Risk RF chronic maladaptive attention seeking behaviors, poor frustration tolerance and impulse control.  PF in treatment, stable housing
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
## 2020-08-26 NOTE — CHART NOTE - NSCHARTNOTEFT_GEN_A_CORE
Social work note:  asked by MD Farrell to call group home due to needing a COVID to return. Per the [facility] mandate, pt must be tested for COVID due to living environment. Pt is from [facility] group home: [address], [place], [address] [phone]. Pt's CM is Emily Luna [phone]. Pt is pending a negative COVID test to return. SW to follow
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
## 2020-08-26 NOTE — ED PROVIDER NOTE - ATTENDING CONTRIBUTION TO CARE
The patient seen and examined    Behavior Problem    I, Dusty Schultz, performed the initial face to face bedside interview with this patient regarding history of present illness, review of symptoms and relevant past medical, social and family history.  I completed an independent physical examination.  I was the initial provider who evaluated this patient. I have signed out the follow up of any pending tests (i.e. labs, radiological studies) to the resident.  I have communicated the patient’s plan of care and disposition with the resident..

## 2020-08-27 VITALS
HEART RATE: 88 BPM | TEMPERATURE: 98 F | RESPIRATION RATE: 18 BRPM | SYSTOLIC BLOOD PRESSURE: 128 MMHG | OXYGEN SATURATION: 100 % | DIASTOLIC BLOOD PRESSURE: 81 MMHG

## 2020-08-27 PROCEDURE — 94640 AIRWAY INHALATION TREATMENT: CPT

## 2020-08-27 PROCEDURE — U0003: CPT

## 2020-08-27 PROCEDURE — 80307 DRUG TEST PRSMV CHEM ANLYZR: CPT

## 2020-08-27 PROCEDURE — 93005 ELECTROCARDIOGRAM TRACING: CPT

## 2020-08-27 PROCEDURE — 99285 EMERGENCY DEPT VISIT HI MDM: CPT | Mod: 25

## 2020-08-27 PROCEDURE — G0378: CPT

## 2020-08-27 PROCEDURE — 81001 URINALYSIS AUTO W/SCOPE: CPT

## 2020-08-27 PROCEDURE — 99217: CPT

## 2020-08-27 RX ADMIN — Medication 1 SPRAY(S): at 06:35

## 2020-08-27 RX ADMIN — Medication 1 MILLIGRAM(S): at 06:35

## 2020-08-27 RX ADMIN — LAMOTRIGINE 200 MILLIGRAM(S): 25 TABLET, ORALLY DISINTEGRATING ORAL at 06:35

## 2020-08-27 RX ADMIN — Medication 12.5 MILLIGRAM(S): at 06:35

## 2020-08-27 RX ADMIN — Medication 50 MILLIGRAM(S): at 06:35

## 2020-08-27 NOTE — ED CDU PROVIDER DISPOSITION NOTE - NSFOLLOWUPINSTRUCTIONS_ED_ALL_ED_FT
Please follow up with your facility psychiatrist within the next 2 days.  A copy of your results will be given to you.  Return for any persistent, worsening symptoms, or ANY concerns at all. Please follow up with your facility psychiatrist within the next 2 days.  A copy of your results will be given to you.  Return for any persistent, worsening symptoms, or ANY concerns at all.    Patient can return to day activities and participate in site programs.

## 2020-08-27 NOTE — ED CDU PROVIDER DISPOSITION NOTE - PATIENT PORTAL LINK FT
You can access the FollowMyHealth Patient Portal offered by [facility] by registering at the following website: [email or web address]. By joining [employer]’s FollowMyHealth portal, you will also be able to view your health information using other applications (apps) compatible with our system.

## 2020-08-27 NOTE — ED ADULT NURSE REASSESSMENT NOTE - GENERAL PATIENT STATE
resting/sleeping/comfortable appearance
resting/sleeping/comfortable appearance
resting/sleeping
resting/sleeping

## 2020-08-27 NOTE — ED ADULT NURSE REASSESSMENT NOTE - COMFORT CARE
wait time explained/ambulated to bathroom/plan of care explained/po fluids offered/meal provided
darkened lights/warm blanket provided

## 2020-08-27 NOTE — ED CDU PROVIDER SUBSEQUENT DAY NOTE - HISTORY
Pt with no acute events.  Pt sleeping in NAD, still awaiting COVID results at this time for return to group residence

## 2020-08-27 NOTE — CHART NOTE - NSCHARTNOTEFT_GEN_A_CORE
SW Note: Pt was waiting covid results prior to returning to group home. Covid neg. Spoke with Deloris from [facility], [phone]. They are waiting for extra staff to come in and will call nurses station when they can send someone to  pt.  RN Eder leblanc.

## 2020-08-27 NOTE — ED POST DISCHARGE NOTE - RESULT SUMMARY
no documented call back. all completed results given to on call nurse. no further results pending at this time. advised on fu

## 2020-08-27 NOTE — ED CDU PROVIDER DISPOSITION NOTE - CLINICAL COURSE
Patient signed out to me by Dr. Campo, medically cleared, waiting for covid test prior to discharge.  Patient seen by psychiatry and cleared to return home. Patient signed out to me by Dr. Campo, medically cleared, waiting for covid test prior to discharge.  Patient had urine drug screen and analysis done as well as ekg.  Patient seen by psychiatry and cleared to return home.

## 2020-08-27 NOTE — ED ADULT NURSE REASSESSMENT NOTE - STATUS
awaiting covid results to return to group home.
awaiting discharge, no change
awaiting discharge, no change
awaiting discharge, no change/covid neg

## 2020-08-27 NOTE — ED ADULT NURSE REASSESSMENT NOTE - NS ED NURSE REASSESS COMMENT FT1
Assumed care of patient at 0715.  Patient awake in bed, affect bright, patient childlike, provided breakfast tray and eating in bed.  No attempts to harm self or others and safety maintained.
pt cleared by psychiatry to return home. pt has been calm and compliant to requests. pt has not attempted to harm self or others. pt is awaiting covid test results to return to his group home.
as per Dr. Campo pt's covid neg and can be discharge.  SW made aware
pt's has shown no aggression or self injurious behavior.  pt's appetite good consuming 100% of meals. pt has been compliant with medications administered.

## 2020-08-29 NOTE — ED ADULT TRIAGE NOTE - ACCOMPANIED BY
Melanie Joseana was seen and treated in our emergency department on 8/29/2020  Diagnosis:     Ronny Whittington  may return to work on return date  He may return on this date: 09/02/2020         If you have any questions or concerns, please don't hesitate to call        Jossue Licea MD    ______________________________           _______________          _______________  Hospital Representative                              Date                                Time Immediate family member

## 2020-09-25 ENCOUNTER — EMERGENCY (EMERGENCY)
Facility: HOSPITAL | Age: 25
LOS: 1 days | Discharge: HOME | End: 2020-09-25
Attending: STUDENT IN AN ORGANIZED HEALTH CARE EDUCATION/TRAINING PROGRAM
Payer: MEDICAID

## 2020-09-25 VITALS
RESPIRATION RATE: 18 BRPM | HEART RATE: 110 BPM | HEIGHT: 71 IN | TEMPERATURE: 99 F | SYSTOLIC BLOOD PRESSURE: 130 MMHG | OXYGEN SATURATION: 98 % | DIASTOLIC BLOOD PRESSURE: 80 MMHG | WEIGHT: 130.07 LBS

## 2020-09-25 DIAGNOSIS — F71 MODERATE INTELLECTUAL DISABILITIES: ICD-10-CM

## 2020-09-25 DIAGNOSIS — F63.81 INTERMITTENT EXPLOSIVE DISORDER: ICD-10-CM

## 2020-09-25 PROCEDURE — 73060 X-RAY EXAM OF HUMERUS: CPT | Mod: 26,LT

## 2020-09-25 PROCEDURE — 73110 X-RAY EXAM OF WRIST: CPT

## 2020-09-25 PROCEDURE — 99284 EMERGENCY DEPT VISIT MOD MDM: CPT

## 2020-09-25 PROCEDURE — 73110 X-RAY EXAM OF WRIST: CPT | Mod: 26,RT

## 2020-09-25 PROCEDURE — 73060 X-RAY EXAM OF HUMERUS: CPT

## 2020-09-25 PROCEDURE — 90792 PSYCH DIAG EVAL W/MED SRVCS: CPT

## 2020-09-25 RX ORDER — ACETAMINOPHEN 500 MG
650 TABLET ORAL ONCE
Refills: 0 | Status: DISCONTINUED | OUTPATIENT
Start: 2020-09-25 | End: 2020-09-25

## 2020-09-25 NOTE — ED ADULT NURSE REASSESSMENT NOTE - NS ED NURSE REASSESS COMMENT FT1
Pt in no apparent distress at this time. Pt being evaluated by psych. As per STANTON menchaca, no blood work needed at this time. Orders to be cancelled.
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
## 2020-09-25 NOTE — ED PROVIDER NOTE - CARE PLAN
Principal Discharge DX:	Abrasions of multiple sites  Secondary Diagnosis:	Arm pain  Secondary Diagnosis:	Mood disorder

## 2020-09-25 NOTE — ED BEHAVIORAL HEALTH ASSESSMENT NOTE - DESCRIPTION
HTN, CP, Asthma, GERD Pt lived with adoptive Aunt prior to group homes Pt appears  calm and cooperative, in good spirits. No agitation or aggression since arrival.  He was not in physical distress.
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
## 2020-09-25 NOTE — ED PROVIDER NOTE - PHYSICAL EXAMINATION
Tender to right hand and left humerus, abrasion noted on the palmar aspect of right hand and left 3rd digit, no foreign bodies
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
## 2020-09-25 NOTE — ED PROVIDER NOTE - CROS ED NEURO ALL NEG
[facility] Emergency Department. negative...

## 2020-09-25 NOTE — ED PROVIDER NOTE - PROGRESS NOTE DETAILS
POLO- no fractures noted, seen and evaluated by Psych NP, Np verbalized that he was cleared, pending note, pt no active threat to himself or others, Facility has transportation here at this time and need to get back or else no one can take them back. Pt reassessed, pt feeling better at this time, vss, pt able to walk, talk and vocalized plan of action. Discussed in depth and explained to pt in depth the next steps that need to be taking including proper follow up with PCP or specialists. All incidental findings were discussed with pt as well. Pt verbalized their concerns and all questions were answered. Pt understands dispo and wants discharge. Given good instructions when to return to ED and importance of f/u.
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
## 2020-09-25 NOTE — ED PROVIDER NOTE - PATIENT PORTAL LINK FT
You can access the FollowMyHealth Patient Portal offered by [facility] by registering at the following website: [email or web address]. By joining [employer]’s FollowMyHealth portal, you will also be able to view your health information using other applications (apps) compatible with our system.

## 2020-09-25 NOTE — ED BEHAVIORAL HEALTH ASSESSMENT NOTE - CURRENT MEDICATION
Cogentin 1 mg bid, Metoprolol 25 mg 1/2 tab bid, Lamictal 200 mg bid, Risperdal 3 mg 2 tabs at hs, Thorazine 50mg bid, Clonidine 0,2 hs
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
## 2020-09-25 NOTE — ED BEHAVIORAL HEALTH ASSESSMENT NOTE - OTHER
may be influenced negatively by another resident with frequent behaviors who is a new resident of home no h/o SA or SIB As per chart DR Hudson reports Pt will state he has AH but has never had them. chronically poor limited Staff group home total of 7 residents and staff

## 2020-10-13 NOTE — ED PROVIDER NOTE - CROS ED NEURO POS
[Takes medication as prescribed] : takes [None] : Patient does not have any barriers to medication adherence HEADACHE

## 2020-11-04 ENCOUNTER — TRANSCRIPTION ENCOUNTER (OUTPATIENT)
Age: 25
End: 2020-11-04

## 2020-12-07 NOTE — ED ADULT NURSE NOTE - NS_BH TRG QUESTION1_ED_ALL_ED
Patient's positive result has been appropriately evaluated by the provider pool. Patient was unable to be reached over the phone. A voicemail was left with the patient. Will await a return phone call. Will try to reach patient again tomorrow per protocol. No

## 2020-12-14 NOTE — ED BEHAVIORAL HEALTH ASSESSMENT NOTE - ACTIVATING EVENTS/STRESSORS
Other

## 2021-01-06 ENCOUNTER — EMERGENCY (EMERGENCY)
Facility: HOSPITAL | Age: 26
LOS: 1 days | End: 2021-01-06
Payer: MEDICAID

## 2021-01-06 VITALS
TEMPERATURE: 98 F | HEIGHT: 71 IN | DIASTOLIC BLOOD PRESSURE: 65 MMHG | HEART RATE: 84 BPM | RESPIRATION RATE: 18 BRPM | OXYGEN SATURATION: 99 % | SYSTOLIC BLOOD PRESSURE: 125 MMHG

## 2021-01-06 LAB — SARS-COV-2 RNA SPEC QL NAA+PROBE: SIGNIFICANT CHANGE UP

## 2021-01-06 PROCEDURE — 99283 EMERGENCY DEPT VISIT LOW MDM: CPT

## 2021-01-06 PROCEDURE — U0003: CPT

## 2021-01-06 PROCEDURE — U0005: CPT

## 2021-01-06 NOTE — ED PROVIDER NOTE - CLINICAL SUMMARY MEDICAL DECISION MAKING FREE TEXT BOX
Pt nontoxic appearing, stable vitals, ambulatory with stable saturation without supplemental oxygen. PT does not meet criteria listed in most updated guidelines as per [facility] protocol/algorithm for admission at this time. pt advised about self-quarantine instructions until negative test results and/or symptom resolution. pt advised on hand hygiene, monitoring of symptoms, antipyretic use as well as and fu with primary care provider. Instructions given in pre-printed copy.

## 2021-01-06 NOTE — ED PROVIDER NOTE - OBJECTIVE STATEMENT
COVID ASYMPTOMATIC SWAB  Pt presenting to the ER for COVID-19 testing. Denies fevers chills, loss of taste or smell, URI symptoms, chest pain or shortness of breath, nausea vomiting diarrhea abdominal pain, weakness or fatigue. Eating and drinking normal diet. Normal output. Pt requesting testing at this time. [x] known exposure [] no-known exposure [] travel [x] no travel [ ] smoker [ ] non-smoker

## 2021-01-06 NOTE — ED PROVIDER NOTE - PATIENT PORTAL LINK FT
You can access the FollowMyHealth Patient Portal offered by [facility] by registering at the following website: [email or web address]. By joining [email or web address]’s FollowMyHealth portal, you will also be able to view your health information using other applications (apps) compatible with our system.

## 2021-01-26 ENCOUNTER — EMERGENCY (EMERGENCY)
Facility: HOSPITAL | Age: 26
LOS: 1 days | Discharge: HOME | End: 2021-01-26
Payer: MEDICAID

## 2021-01-26 VITALS
DIASTOLIC BLOOD PRESSURE: 78 MMHG | SYSTOLIC BLOOD PRESSURE: 144 MMHG | HEART RATE: 124 BPM | TEMPERATURE: 100 F | HEIGHT: 71 IN | OXYGEN SATURATION: 98 % | RESPIRATION RATE: 20 BRPM

## 2021-01-26 LAB — SARS-COV-2 RNA SPEC QL NAA+PROBE: SIGNIFICANT CHANGE UP

## 2021-01-26 PROCEDURE — 99283 EMERGENCY DEPT VISIT LOW MDM: CPT

## 2021-01-26 PROCEDURE — U0003: CPT

## 2021-01-26 PROCEDURE — U0005: CPT

## 2021-01-26 NOTE — ED PROVIDER NOTE - CHIEF COMPLAINT
Hospitalist Progress Note      PCP: Sebas Renteria    Date of Admission: 9/22/2020    Chief Complaint: fall at home, back pain, ataxia, unable to ambulate, left side weakness UE and LE        Subjective: ongoing lower back pain today. Unable to ambulate. Coughs with food. Medications:  Reviewed    Infusion Medications    dextrose       Scheduled Medications    insulin glargine  10 Units Subcutaneous Nightly    aspirin  325 mg Oral Once    sodium chloride flush  10 mL Intravenous 2 times per day    enoxaparin  40 mg Subcutaneous Nightly    aspirin  81 mg Oral Daily    Or    aspirin  300 mg Rectal Daily    sucralfate  1 g Oral 3 times per day    clopidogrel  75 mg Oral Daily    DULoxetine  30 mg Oral Daily    losartan  100 mg Oral Daily    pantoprazole  40 mg Oral BID AC    polyethylene glycol  17 g Oral Daily    rosuvastatin  10 mg Oral Daily    tamsulosin  0.4 mg Oral Nightly    insulin lispro  0-6 Units Subcutaneous TID WC    insulin lispro  0-3 Units Subcutaneous Nightly     PRN Meds: traMADol **OR** traMADol, sodium chloride flush, polyethylene glycol, promethazine **OR** ondansetron, perflutren lipid microspheres, glucose, dextrose, glucagon (rDNA), dextrose      Intake/Output Summary (Last 24 hours) at 9/23/2020 1245  Last data filed at 9/22/2020 2003  Gross per 24 hour   Intake 1000 ml   Output --   Net 1000 ml       Physical Exam Performed:    BP (!) 166/86   Pulse 74   Temp 97.9 °F (36.6 °C) (Oral)   Resp 20   Ht 5' 3\" (1.6 m)   Wt 131 lb 9.8 oz (59.7 kg)   SpO2 96%   BMI 23.31 kg/m²     General appearance: No apparent distress, appears stated age and cooperative. HEENT: Pupils equal, round, and reactive to light. Conjunctivae/corneas clear. Neck: Supple, with full range of motion. No jugular venous distention. Trachea midline. Respiratory:  Normal respiratory effort. Clear to auscultation, bilaterally without Rales/Wheezes/Rhonchi.   Cardiovascular: Regular rate and rhythm with normal S1/S2 without murmurs, rubs or gallops. Abdomen: Soft, non-tender, non-distended with normal bowel sounds. Musculoskeletal: No clubbing, cyanosis or edema bilaterally. Full range of motion without deformity. Skin: Skin color, texture, turgor normal.  No rashes or lesions. Neurologic:  LUE and LLE weakness. Pt having difficulty following instruction for neuro exam event with  service due to very [address] Psychiatric: Alert and oriented, thought content mostly appropriate, limited insight. Capillary Refill: Brisk,< 3 seconds   Peripheral Pulses: +2 palpable, equal bilaterally       Labs:   Recent Labs     09/22/20  1137 09/23/20  0513   WBC 7.6 6.7   HGB 14.0 13.2*   HCT 42.1 39.9*    230     Recent Labs     09/22/20  1137 09/23/20  0513    139   K 3.9 3.7    107   CO2 24 22   BUN 17 12   CREATININE 1.4* 1.3   CALCIUM 9.3 8.9     Recent Labs     09/22/20  1137   AST 23   ALT 16   BILITOT 0.7   ALKPHOS 107     Recent Labs     09/22/20  1137   INR 0.92     Recent Labs     09/22/20  1802 09/22/20  2305   TROPONINI <0.01 <0.01       Urinalysis:      Lab Results   Component Value Date    NITRU Negative 09/03/2020    WBCUA 4 09/03/2020    RBCUA 1 09/03/2020    BLOODU Negative 09/03/2020    SPECGRAV 1.017 09/03/2020    GLUCOSEU 100 09/03/2020       Radiology:  MRI brain without contrast   Final Result   Right paramedian pontine acute to subacute infarct. No hemorrhagic conversion      Additionally areas of likely subacute lacunar infarct involving the left   frontal lobe deep white matter as well as the right posterior centrum   semiovale      Chronic small ischemic disease and age related change. XR LUMBAR SPINE (2-3 VIEWS)   Final Result   1. Multilevel degenerative disc disease and facet joint arthropathy   2. Mild anterior wedging L1 not evident on previous CT done 04/28/2020 could   represent a mild acute compression fracture.          CT HEAD WO CONTRAST   Final Result   Low-attenuation in the right aspect the son suspicious for acute ischemia. CTA HEAD NECK W CONTRAST   Final Result   50% stenosis of proximal left internal carotid artery. Severe stenosis of proximal left posterior cerebral artery. XR CHEST PORTABLE   Final Result      No acute abnormality improved aeration lungs compared to the prior study                 Assessment/Plan:    Active Hospital Problems    Diagnosis    Acute cerebrovascular accident [facility]) [I63.9]       Acute Ischemic CVA  Multiple locations, possibly embolic process. Plan:    - neuro involved. - cont telemetry. - pt on plavix and statin when this happened. HTN - permissive HTN. DMII uncontrolled. Cont lantus, ISS. Back Pain. Has L1 compression fracture on XR. Add tramadol for symptom control. PTOT as tolerates.          DVT Prophylaxis: lovenox  Diet: DIET GENERAL; Carb Control: 4 carb choices (60 gms)/meal; Dysphagia Minced and Moist  Code Status: Full Code        Dispo - ask for rehab eval.     Fer Griffin MD The patient is a 25y Male complaining of medical evaluation.

## 2021-01-26 NOTE — ED PROVIDER NOTE - PATIENT PORTAL LINK FT
You can access the FollowMyHealth Patient Portal offered by [facility] by registering at the following website: [email or web address]. By joining [employer]’s FollowMyHealth portal, you will also be able to view your health information using other applications (apps) compatible with our system.
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
## 2021-01-26 NOTE — ED PROVIDER NOTE - PHYSICAL EXAMINATION
Gen: WD/WN, non-toxic  HENT: NCAT  Cardiac: Well perfused  Resp: No resp distress  Skin: no rashes or lesions  Neuro: Awake, alert
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
## 2021-03-10 ENCOUNTER — EMERGENCY (EMERGENCY)
Facility: HOSPITAL | Age: 26
LOS: 1 days | Discharge: HOME | End: 2021-03-10
Payer: MEDICAID

## 2021-03-10 VITALS
SYSTOLIC BLOOD PRESSURE: 143 MMHG | OXYGEN SATURATION: 96 % | HEIGHT: 71 IN | HEART RATE: 103 BPM | TEMPERATURE: 98 F | DIASTOLIC BLOOD PRESSURE: 80 MMHG | RESPIRATION RATE: 18 BRPM

## 2021-03-10 LAB — SARS-COV-2 RNA SPEC QL NAA+PROBE: SIGNIFICANT CHANGE UP

## 2021-03-10 PROCEDURE — U0003: CPT

## 2021-03-10 PROCEDURE — 99282 EMERGENCY DEPT VISIT SF MDM: CPT

## 2021-03-10 PROCEDURE — U0005: CPT

## 2021-03-10 PROCEDURE — 99283 EMERGENCY DEPT VISIT LOW MDM: CPT
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
## 2021-03-10 NOTE — ED PROVIDER NOTE - PATIENT PORTAL LINK FT
You can access the FollowMyHealth Patient Portal offered by [facility] by registering at the following website: [email or web address]. By joining [employer]’s FollowMyHealth portal, you will also be able to view your health information using other applications (apps) compatible with our system.

## 2021-03-18 ENCOUNTER — EMERGENCY (EMERGENCY)
Facility: HOSPITAL | Age: 26
LOS: 1 days | Discharge: HOME | End: 2021-03-18
Payer: MEDICAID

## 2021-03-18 VITALS
DIASTOLIC BLOOD PRESSURE: 73 MMHG | OXYGEN SATURATION: 100 % | HEART RATE: 83 BPM | SYSTOLIC BLOOD PRESSURE: 132 MMHG | HEIGHT: 71 IN | RESPIRATION RATE: 18 BRPM

## 2021-03-18 LAB — SARS-COV-2 RNA SPEC QL NAA+PROBE: SIGNIFICANT CHANGE UP

## 2021-03-18 PROCEDURE — U0003: CPT

## 2021-03-18 PROCEDURE — 99282 EMERGENCY DEPT VISIT SF MDM: CPT

## 2021-03-18 PROCEDURE — 99283 EMERGENCY DEPT VISIT LOW MDM: CPT

## 2021-03-18 PROCEDURE — U0005: CPT

## 2021-03-18 NOTE — ED PROVIDER NOTE - PATIENT PORTAL LINK FT
You can access the FollowMyHealth Patient Portal offered by [facility] by registering at the following website: [email or web address]. By joining [employer]’s FollowMyHealth portal, you will also be able to view your health information using other applications (apps) compatible with our system.

## 2021-04-16 ENCOUNTER — EMERGENCY (EMERGENCY)
Facility: HOSPITAL | Age: 26
LOS: 1 days | Discharge: HOME | End: 2021-04-16
Payer: MEDICAID

## 2021-04-16 VITALS
HEART RATE: 80 BPM | WEIGHT: 130.07 LBS | OXYGEN SATURATION: 98 % | DIASTOLIC BLOOD PRESSURE: 74 MMHG | TEMPERATURE: 99 F | HEIGHT: 71 IN | SYSTOLIC BLOOD PRESSURE: 114 MMHG | RESPIRATION RATE: 20 BRPM

## 2021-04-16 LAB — SARS-COV-2 RNA SPEC QL NAA+PROBE: SIGNIFICANT CHANGE UP

## 2021-04-16 PROCEDURE — 99282 EMERGENCY DEPT VISIT SF MDM: CPT

## 2021-04-16 PROCEDURE — U0003: CPT

## 2021-04-16 PROCEDURE — 99283 EMERGENCY DEPT VISIT LOW MDM: CPT

## 2021-04-16 PROCEDURE — U0005: CPT

## 2021-04-16 NOTE — ED PROVIDER NOTE - PATIENT PORTAL LINK FT
You can access the FollowMyHealth Patient Portal offered by [facility] by registering at the following website: [email or web address]. By joining [employer]’s FollowMyHealth portal, you will also be able to view your health information using other applications (apps) compatible with our system.

## 2021-04-16 NOTE — ED PROVIDER NOTE - OBJECTIVE STATEMENT
25.y/o M presents to ED for COVID testing. presents with facility nurse.  Pt currently asymptomatic. No chest pain, sob, cough, abd pain, n/v/d, headache, dizziness. + sick contacts. No recent travel. Pt well appearing. NAD.

## 2021-05-08 NOTE — ED ADULT TRIAGE NOTE - NS_BH TRG QUESTION7_ED_ALL_ED
Preoperative diagnosis-  1.  Right lower extremity radiculopathy  2. L5-S1 right paracentral disc herniation with severe lateral recess stenosis  3.  Failed conservative management     Post operative diagnosis-  Same as preoperative diagnosis     Procedure performed-  1.  Right L5-S1 laminotomy  2.  Right L5-S1 medial facetectomy  3.  Right L5-S1 microdiscectomy  4.  Right L5-S1 foraminotomy  5.  Lysis of adhesions  6.  Use of intraoperative fluoroscopy with interpretation     Surgeon-  Yessica Fonseca DO     Assistant-  Nalini Landaverde NP who assisted in opening closing and retraction and positioning     Anesthesia-  General anesthesia     Estimated blood loss-  See anesthesia flowsheet     Blood products given intraoperatively-  None     Findings-  Right L5-S1 paracentral disc herniation with lateral recess stenosis compressing the passing by nerve root    Specimen-  None     Drains-  None     Complications-  None immediate     Disposition-  Extubated to PACU     Indications for procedure-  This is a 55-year-old female with right lower extremity radiculopathy that has failed conservative management with right paracentral discrimination at the level of L5-S1 with lateral recess stenosis compressing the passing by nerve root. After discussion with the patient and reviewing the images, the patient elected to proceed with right-sided L5-S1 foraminotomy laminotomy and microdiscectomy.  Possible complications such procedure include but limited to death paralysis injury to nerve roots injury to thecal sac the need for operation postoperative pain bleeding infection CSF fistula that requires operative intervention disc reherniation deep venous versus pulmonary embolism postoperative clinical decline and incision complications.       Description of the procedure-  The patient was transported from the holding area to the operating room with IV fluids started.  Upon entering the operating room IV antibiotics were given.   Patient was intubated.  She was positioned in the prone position on a Gabe table with Yves frame mounted on the table.  All pressure points well-padded.  The level of interest was confirmed.  The back area was cleansed.  Timeout was obtained.  The patient was prepped and draped in standard sterile fashion.  Previously marked incision was injected with quarter Marcaine with epinephrine 1-200,000.  It was incised utilizing 10 scalpel blade.  Dissection continued in a subperiosteal fashion exposing the level of L5-S1.  Intraoperative fluoroscopy obtained to confirm the level of interest.  Self-retaining retractors applied.  The level of interest confirmed again.  Air-powered drill was utilized to initiate laminotomy at the level of right L5-S1 in addition to medial facetectomy also Kerrison punches 2 mm 3 mm were used to complete this part of the procedure.  Under ligamentum flavum was then entered with a blunt instrument More dissector.  Underlying thecal sac was protected with padding.  Ligamentum flavum was resected utilizing Kerrison punches 2 mm and 3 mm.  Passing by nerve root was identified.    Fixed nerve root to the underlying annulus fibrosis was identified.  The nerve root itself was deformed.  With significant adhesions to the thecal sac in addition to the underlying annulus fibrosis.  Utilized meticulous neurosurgical technique with neurosurgical instruments to free the nerve root and mobilize it.  The nerve root and thecal sac retracted medially.  The disc space was entered utilizing 15 scalpel blade.  The disc was then removed in piecemeal fashion with multiple pieces pulled out utilizing pituitary rongeurs and nerve hooks.  Further resection of fragments continued utilizing pituitary rongeurs.  Then we utilized irrigation into the disc space to tease out any free fragments.  After adequate decompression and confirmation that there were no free fragments the area was irrigated with normal  saline and antibiotics.  Hemostasis obtained.  The muscle and the fascia closed utilizing 0 Vicryl the skin closed utilizing 3-0 Vicryl and running 3-0 nylon.  At the end of the procedure all instruments needles cottonoids and gauzes were accounted for x2.                 Anxiety (includes Panic, OCD)/Behavioral Problems (includes ADHD, Oppositionality)

## 2021-08-14 ENCOUNTER — EMERGENCY (EMERGENCY)
Facility: HOSPITAL | Age: 26
LOS: 1 days | Discharge: HOME | End: 2021-08-14
Payer: MEDICAID

## 2021-08-14 VITALS
OXYGEN SATURATION: 99 % | HEART RATE: 85 BPM | SYSTOLIC BLOOD PRESSURE: 120 MMHG | RESPIRATION RATE: 18 BRPM | DIASTOLIC BLOOD PRESSURE: 66 MMHG | TEMPERATURE: 98 F | HEIGHT: 71 IN

## 2021-08-14 LAB — SARS-COV-2 RNA SPEC QL NAA+PROBE: SIGNIFICANT CHANGE UP

## 2021-08-14 PROCEDURE — 99283 EMERGENCY DEPT VISIT LOW MDM: CPT

## 2021-08-14 PROCEDURE — U0005: CPT

## 2021-08-14 PROCEDURE — 99282 EMERGENCY DEPT VISIT SF MDM: CPT

## 2021-08-14 PROCEDURE — U0003: CPT

## 2021-08-14 NOTE — ED PROVIDER NOTE - PATIENT PORTAL LINK FT
You can access the FollowMyHealth Patient Portal offered by [facility] by registering at the following website: [email or web address]. By joining [employer] [facility]’s FollowMyHealth portal, you will also be able to view your health information using other applications (apps) compatible with our system.

## 2021-08-14 NOTE — ED PROVIDER NOTE - OBJECTIVE STATEMENT
requesting covid testing.  Patient is asymptomatic.  Denies chest pain, shortness of breath, vomiting, abdominal pain.  + exposure.  Patient was vaccinated against covid.

## 2021-08-14 NOTE — ED PROVIDER NOTE - PRO INTERPRETER NEED 2
Pt states she didn't feel good, and felt like she was going to pass out. One dose of nitrog was given by ems   English

## 2021-08-28 ENCOUNTER — EMERGENCY (EMERGENCY)
Facility: HOSPITAL | Age: 26
LOS: 1 days | Discharge: HOME | End: 2021-08-28
Attending: EMERGENCY MEDICINE
Payer: MEDICAID

## 2021-08-28 VITALS
DIASTOLIC BLOOD PRESSURE: 88 MMHG | RESPIRATION RATE: 18 BRPM | WEIGHT: 125 LBS | HEART RATE: 112 BPM | HEIGHT: 71 IN | OXYGEN SATURATION: 97 % | SYSTOLIC BLOOD PRESSURE: 133 MMHG | TEMPERATURE: 102 F

## 2021-08-28 LAB
HPIV3 RNA SPEC QL NAA+PROBE: DETECTED
RAPID RVP RESULT: DETECTED
S PYO DNA THROAT QL NAA+PROBE: SIGNIFICANT CHANGE UP
SARS-COV-2 RNA SPEC QL NAA+PROBE: SIGNIFICANT CHANGE UP

## 2021-08-28 PROCEDURE — 71045 X-RAY EXAM CHEST 1 VIEW: CPT | Mod: 26

## 2021-08-28 PROCEDURE — 87651 STREP A DNA AMP PROBE: CPT

## 2021-08-28 PROCEDURE — 87798 DETECT AGENT NOS DNA AMP: CPT

## 2021-08-28 PROCEDURE — 71045 X-RAY EXAM CHEST 1 VIEW: CPT

## 2021-08-28 PROCEDURE — 99283 EMERGENCY DEPT VISIT LOW MDM: CPT | Mod: 25

## 2021-08-28 PROCEDURE — 0225U NFCT DS DNA&RNA 21 SARSCOV2: CPT

## 2021-08-28 PROCEDURE — 99284 EMERGENCY DEPT VISIT MOD MDM: CPT

## 2021-08-28 RX ORDER — IBUPROFEN 200 MG
600 TABLET ORAL ONCE
Refills: 0 | Status: COMPLETED | OUTPATIENT
Start: 2021-08-28 | End: 2021-08-28

## 2021-08-28 RX ADMIN — Medication 600 MILLIGRAM(S): at 16:53

## 2021-08-28 RX ADMIN — Medication 100 MILLIGRAM(S): at 18:26

## 2021-08-28 NOTE — ED PROVIDER NOTE - PATIENT PORTAL LINK FT
You can access the FollowMyHealth Patient Portal offered by [facility] by registering at the following website: [email or web address]. By joining [facility]’s FollowMyHealth portal, you will also be able to view your health information using other applications (apps) compatible with our system.

## 2021-08-28 NOTE — ED PROVIDER NOTE - OBJECTIVE STATEMENT
27 y/o male presents with fever c/o " I don't feel well". history is limited due to baseline mental status. He is here today with group home staff.

## 2021-08-28 NOTE — ED PROVIDER NOTE - ATTENDING CONTRIBUTION TO CARE
The patient seen and examined    Cough    I, Dusty Schultz, performed the initial face to face bedside interview with this patient regarding history of present illness, review of symptoms and relevant past medical, social and family history.  I completed an independent physical examination.  I was the initial provider who evaluated this patient. I have signed out the follow up of any pending tests (i.e. labs, radiological studies) to the ACP.  I have communicated the patient’s plan of care and disposition with the ACP.

## 2021-08-28 NOTE — ED PROVIDER NOTE - NSFOLLOWUPINSTRUCTIONS_ED_ALL_ED_FT
Pneumonia    Pneumonia is an infection of the lungs. Pneumonia may be caused by bacteria, viruses, or funguses. Symptoms include coughing, fever, chest pain when breathing deeply or coughing, shortness of breath, fatigue, or muscle aches. Pneumonia can be diagnosed with a medical history and physical exam, as well as other tests which may include a chest X-ray. If you were prescribed an antibiotic medicine, take it as told by your health care provider and do not stop taking the antibiotic even if you start to feel better. Do not use tobacco products, including cigarettes, chewing tobacco, and e-cigarettes.    SEEK IMMEDIATE MEDICAL CARE IF YOU HAVE ANY OF THE FOLLOWING SYMPTOMS: worsening shortness of breath, worsening chest pain, coughing up blood, change in mental status, lightheadedness/dizziness.     Please follow up with your doctor within 48 hours

## 2021-08-28 NOTE — ED PROVIDER NOTE - CLINICAL SUMMARY MEDICAL DECISION MAKING FREE TEXT BOX
chest x-ray reviewed with Dr. Schultz who recommends tx with doxy for possible pneumonia   precautions discussed, d/c on abx , follow up pmd

## 2021-08-28 NOTE — ED ADULT NURSE NOTE - NSICDXPASTMEDICALHX_GEN_ALL_CORE_FT
PAST MEDICAL HISTORY:  ADHD (attention deficit hyperactivity disorder)     Asthma     Autism     Cerebral palsy     Explosive personality disorder     Mental retardation     Mood disorder     Parkinson disease

## 2021-08-29 NOTE — ED POST DISCHARGE NOTE - DETAILS
Pt called back hospital, confirmed , discussed results of parainfluenza, results faxed to group home

## 2021-08-29 NOTE — ED POST DISCHARGE NOTE - RESULT SUMMARY
attempted to contact the group home, manager unable to provide correct birth date. said that she doesn't have his information on hand. and to either try later or send email. certified letter to be sent regarding results

## 2021-08-30 NOTE — ED BEHAVIORAL HEALTH ASSESSMENT NOTE - IMPULSE CONTROL
[No Acute Distress] : no acute distress [Well Nourished] : well nourished [Well Developed] : well developed [Well-Appearing] : well-appearing [No Respiratory Distress] : no respiratory distress  [No Accessory Muscle Use] : no accessory muscle use [Clear to Auscultation] : lungs were clear to auscultation bilaterally [Normal Rate] : normal rate  [Regular Rhythm] : with a regular rhythm [Normal S1, S2] : normal S1 and S2 [No Murmur] : no murmur heard [No Carotid Bruits] : no carotid bruits [No Edema] : there was no peripheral edema [Soft] : abdomen soft [Non Tender] : non-tender [Non-distended] : non-distended [No Masses] : no abdominal mass palpated [No HSM] : no HSM [Normal Bowel Sounds] : normal bowel sounds [Deep Tendon Reflexes (DTR)] : deep tendon reflexes were 2+ and symmetric [de-identified] : (+)Dermatitic Rash in her bilateral anterior wrists  Impaired

## 2021-09-10 NOTE — ED BEHAVIORAL HEALTH ASSESSMENT NOTE - ELEVATED CHRONIC RISK
From: Glenda Carson  To: Rudolph Cat MD  Sent: 9/9/2021 3:46 PM CDT  Subject: Prescription Question    It is a OneTouch Verio Flex® meter that uses FTL SOLAR test strips. Thanks! No

## 2021-09-27 ENCOUNTER — EMERGENCY (EMERGENCY)
Facility: HOSPITAL | Age: 26
LOS: 1 days | Discharge: HOME | End: 2021-09-27
Payer: MEDICAID

## 2021-09-27 VITALS
TEMPERATURE: 98 F | HEIGHT: 71 IN | SYSTOLIC BLOOD PRESSURE: 118 MMHG | OXYGEN SATURATION: 100 % | DIASTOLIC BLOOD PRESSURE: 71 MMHG | RESPIRATION RATE: 18 BRPM | HEART RATE: 89 BPM

## 2021-09-27 LAB — SARS-COV-2 RNA SPEC QL NAA+PROBE: SIGNIFICANT CHANGE UP

## 2021-09-27 PROCEDURE — U0003: CPT

## 2021-09-27 PROCEDURE — 99282 EMERGENCY DEPT VISIT SF MDM: CPT

## 2021-09-27 PROCEDURE — 99283 EMERGENCY DEPT VISIT LOW MDM: CPT

## 2021-09-27 PROCEDURE — U0005: CPT

## 2021-09-27 NOTE — ED PROVIDER NOTE - OBJECTIVE STATEMENT
Pt presenting to the ER for COVID-19 testing due to exposure at group home. Asymptomatic.  [x] known exposure [] no-known exposure

## 2021-09-27 NOTE — ED ADULT TRIAGE NOTE - CHIEF COMPLAINT QUOTE
Patient arrived to ED today in need for COVID-19 swab to be preformed after exposure as per Karrie hamilton director

## 2021-09-27 NOTE — ED PROVIDER NOTE - PATIENT PORTAL LINK FT
You can access the FollowMyHealth Patient Portal offered by [facility] by registering at the following website: [email or web address]. By joining [employer]’s FollowMyHealth portal, you will also be able to view your health information using other applications (apps) compatible with our system.

## 2021-09-27 NOTE — ED PROVIDER NOTE - CLINICAL SUMMARY MEDICAL DECISION MAKING FREE TEXT BOX
Pt nontoxic appearing, stable vitals, ambulatory with stable saturation without supplemental oxygen. PT does not meet criteria listed in most updated guidelines as per [facility] protocol/algorithm for admission at this time. pt advised about self-quarantine instructions until negative test results and/or symptom resolution. pt advised on hand hygiene, monitoring of symptoms, antipyretic use as well as and fu with primary care provider. Instructions given in pre-printed copy.

## 2021-10-04 ENCOUNTER — EMERGENCY (EMERGENCY)
Facility: HOSPITAL | Age: 26
LOS: 1 days | Discharge: HOME | End: 2021-10-04
Payer: MEDICAID

## 2021-10-04 VITALS
SYSTOLIC BLOOD PRESSURE: 124 MMHG | HEART RATE: 100 BPM | RESPIRATION RATE: 18 BRPM | DIASTOLIC BLOOD PRESSURE: 79 MMHG | HEIGHT: 71 IN | TEMPERATURE: 99 F | OXYGEN SATURATION: 99 %

## 2021-10-04 LAB — SARS-COV-2 RNA SPEC QL NAA+PROBE: SIGNIFICANT CHANGE UP

## 2021-10-04 PROCEDURE — U0003: CPT

## 2021-10-04 PROCEDURE — U0005: CPT

## 2021-10-04 PROCEDURE — 99283 EMERGENCY DEPT VISIT LOW MDM: CPT

## 2021-10-04 PROCEDURE — 99282 EMERGENCY DEPT VISIT SF MDM: CPT

## 2021-10-04 NOTE — ED PROVIDER NOTE - PATIENT PORTAL LINK FT
You can access the FollowMyHealth Patient Portal offered by [facility] by registering at the following website: [email or web address]. By joining [employer]’s FollowMyHealth portal, you will also be able to view your health information using other applications (apps) compatible with our system.

## 2021-10-04 NOTE — ED PROVIDER NOTE - OBJECTIVE STATEMENT
27yo M presenting for covid testing brought in from group home due to exposure at facility. Pt feeling well, no symptoms. Denies fever, chills, cough, sob, cp, body aches, loss of smell/taste.

## 2021-11-18 NOTE — ED ADULT TRIAGE NOTE - BANDS:
Comprehensive Nutrition Assessment    Type and Reason for Visit: Initial, Consult    Nutrition Recommendations/Plan:      Start trophic tube feeding with Nepro     If tolerates well slowly advance to goal: Nepro @ 50 ml/hr with 50 ml water flush q 6 hr    Nutrition Assessment:    Pt admitted with CHF exacerbation. PMHx: CAD-s/p CABG 1 year ago, DM 2, HTN, CKD 4, Diastolic HF. GI symptoms 2-3 weeks PTA including melena, abdominal pain and intermittent vomiting. EGD negative for bleed/cause of anemia. Ascites and (R) pleural effusion-s/p diagnostic thoracentesis today. PEA arest early this morning with suspected anoxic injury. Neurology consulted. SEVERIANO on CKD4-may need RRT soon. Patient appears well-nourished-wife at bedside. Reports pt eating well until about 3 weeks ago when his stomach started to hurt. Noted he doesn't work d/t blindness 2/2 diabetes. A1c indicates poor BG control. BUN, creatinine and phosphorous elevated d/t renal failure. Plan to start trophic tube feeding today. Wife reports pt has not vomited since being in the hospital d/t getting nausea medicine. Recommended goal if pt tolerates trophic feeding: Nepro @ 50 ml/hr with 50 ml water flush q 6 hr. This will provide 1100 ml, 1950 calories, 79 gm protein and 990 ml free water (tube feeding/flush) per day to meet at least 91% estimated needs. Will need to increase protein if pt goes on RRT. Last BM PTA-Miralax ordered yesterday. Add additional agent if no results soon.     Malnutrition Assessment:  Malnutrition Status:  Insufficient data-at risk for malnutrition  Context:  Acute illness     Findings of the 6 clinical characteristics of malnutrition:   Energy Intake:  1 - 75% or less of est energy req for 7 or more days  Weight Loss:  Unable to assess     Body Fat Loss:  No significant body fat loss,     Muscle Mass Loss:  1 - Mild muscle mass loss, Temples (temporalis)  Fluid Accumulation:  1 - Mild, Extremities   Strength:  Not performed Nutritionally Significant Medications: Albumin, Retacrit, Humalog, Protonix, Miralax, Crestor, Zofran prn    Estimated Daily Nutrient Needs:  Energy (kcal): 2125 (25 kcal/kg); Weight Used for Energy Requirements: Current  Protein (g): 68-85 (.8-1g/kg); Increase to 1.2-2.0g/kg if starts RRT Weight Used for Protein Requirements: Current  Fluid (ml/day):  ; Method Used for Fluid Requirements: Standard renal    Nutrition Related Findings:       BM: PTA  Edema: 2+ BLE  Wounds:  None       Current Nutrition Therapies:   Diet: NPO  Additional Caloric Sources:  None  Meal intake:   Patient Vitals for the past 168 hrs:   % Diet Eaten   11/17/21 1432 51 - 75%       Anthropometric Measures:  · Height:  5' 9\" (175.3 cm)  · Current Body Wt:  85 kg (187 lb 6.3 oz) (standing scale 11/16)   · Admission Body Wt:  187 lb 6.3 oz    · Ideal Body Wt:  160:  117.1 %   · BMI Categories:  Overweight (BMI 25.0-29. 9)     Wt Readings from Last 10 Encounters:   11/17/21 85 kg (187 lb 6.3 oz)   08/07/20 83.6 kg (184 lb 3.2 oz)   11/22/19 73.5 kg (162 lb)   08/23/19 74.4 kg (164 lb)   08/05/19 74.8 kg (165 lb)   05/22/19 74.4 kg (164 lb)   04/25/19 73.9 kg (163 lb)   04/03/19 73 kg (161 lb)   03/21/19 73.9 kg (163 lb)   02/21/19 73.5 kg (162 lb)       Nutrition Diagnosis:   · Inadequate oral intake related to cognitive or neurological impairment, impaired respiratory function as evidenced by NPO or clear liquid status due to medical condition, intubation. Nutrition Interventions:   Food and/or Nutrient Delivery: Start tube feeding  Nutrition Education and Counseling: No recommendations at this time  Coordination of Nutrition Care: Continue to monitor while inpatient, Interdisciplinary rounds    Goals: Tolerate EN at goal in next 3-4 days.        Nutrition Monitoring and Evaluation:   Behavioral-Environmental Outcomes: None identified  Food/Nutrient Intake Outcomes: Enteral nutrition intake/tolerance  Physical Signs/Symptoms Outcomes: Biochemical data, GI status, Weight, Fluid status or edema, Hemodynamic status    Discharge Planning:     Too soon to determine     Harper Hdz RD CNSC  Contact: [facility];

## 2021-12-30 NOTE — ED ADULT NURSE NOTE - CHIEF COMPLAINT QUOTE
Please refer to the [facility] ultrasound report in Ob Procedures for additional information regarding today's visit  from group home [facility]

## 2022-01-03 NOTE — ED BEHAVIORAL HEALTH ASSESSMENT NOTE - DETAILS
[No Acute Distress] : no acute distress [Normal Oropharynx] : normal oropharynx [Normal Appearance] : normal appearance [No Neck Mass] : no neck mass [Normal Rate/Rhythm] : normal rate/rhythm chronic behavioral acting out when frustrated or attention seeking.  Destruction of property [Normal S1, S2] : normal s1, s2 [No Murmurs] : no murmurs [No Resp Distress] : no resp distress [Wheeze] : wheeze [Rhonchi] : rhonchi [No Abnormalities] : no abnormalities [Benign] : benign [Normal Gait] : normal gait [No Clubbing] : no clubbing [No Cyanosis] : no cyanosis [No Edema] : no edema [FROM] : FROM [Normal Color/ Pigmentation] : normal color/ pigmentation [No Focal Deficits] : no focal deficits [Oriented x3] : oriented x3 [Normal Affect] : normal affect no h/o SA.  Chronic self harm behaviors, ie head banging for attention and due to poor frustration tolerance. na

## 2022-01-10 NOTE — ED PROVIDER NOTE - NSTIMEPROVIDERCAREINITIATE_GEN_ER
06-Feb-2017 16:55 Constitutional: ill-appearing  EYES: Sclera non-icteric. Conjunctiva non-injected. No discharge.  HENT: NCAT. Fontanelles flat. MMM. Neck supple  CV: tachycardic, +S1, S2  Resp: Increased WOB. CTAB.  GI: Soft, NT/ND, no masses or organomegaly appreciated.  : Normal external female anatomy  MSK: No gross deformities appreciated.  Neuro: Normal muscle tone. Moving all extremities.  Skin: No rashes.

## 2022-01-25 ENCOUNTER — EMERGENCY (EMERGENCY)
Facility: HOSPITAL | Age: 27
LOS: 1 days | Discharge: HOME | End: 2022-01-25
Attending: STUDENT IN AN ORGANIZED HEALTH CARE EDUCATION/TRAINING PROGRAM
Payer: MEDICAID

## 2022-01-25 VITALS
SYSTOLIC BLOOD PRESSURE: 140 MMHG | DIASTOLIC BLOOD PRESSURE: 80 MMHG | HEIGHT: 71 IN | HEART RATE: 84 BPM | TEMPERATURE: 98 F | RESPIRATION RATE: 18 BRPM | WEIGHT: 130.07 LBS

## 2022-01-25 VITALS
TEMPERATURE: 99 F | RESPIRATION RATE: 18 BRPM | DIASTOLIC BLOOD PRESSURE: 68 MMHG | HEART RATE: 80 BPM | SYSTOLIC BLOOD PRESSURE: 105 MMHG | OXYGEN SATURATION: 97 %

## 2022-01-25 LAB
ALBUMIN SERPL ELPH-MCNC: 5.1 G/DL — SIGNIFICANT CHANGE UP (ref 3.3–5.2)
ALP SERPL-CCNC: 66 U/L — SIGNIFICANT CHANGE UP (ref 40–120)
ALT FLD-CCNC: 13 U/L — SIGNIFICANT CHANGE UP
ANION GAP SERPL CALC-SCNC: 11 MMOL/L — SIGNIFICANT CHANGE UP (ref 5–17)
APPEARANCE UR: CLEAR — SIGNIFICANT CHANGE UP
AST SERPL-CCNC: 21 U/L — SIGNIFICANT CHANGE UP
BASOPHILS # BLD AUTO: 0.04 K/UL — SIGNIFICANT CHANGE UP (ref 0–0.2)
BASOPHILS NFR BLD AUTO: 0.9 % — SIGNIFICANT CHANGE UP (ref 0–2)
BILIRUB SERPL-MCNC: 0.5 MG/DL — SIGNIFICANT CHANGE UP (ref 0.4–2)
BILIRUB UR-MCNC: NEGATIVE — SIGNIFICANT CHANGE UP
BUN SERPL-MCNC: 15 MG/DL — SIGNIFICANT CHANGE UP (ref 8–20)
CALCIUM SERPL-MCNC: 10 MG/DL — SIGNIFICANT CHANGE UP (ref 8.6–10.2)
CHLORIDE SERPL-SCNC: 105 MMOL/L — SIGNIFICANT CHANGE UP (ref 98–107)
CO2 SERPL-SCNC: 26 MMOL/L — SIGNIFICANT CHANGE UP (ref 22–29)
COLOR SPEC: YELLOW — SIGNIFICANT CHANGE UP
CREAT SERPL-MCNC: 0.92 MG/DL — SIGNIFICANT CHANGE UP (ref 0.5–1.3)
DIFF PNL FLD: NEGATIVE — SIGNIFICANT CHANGE UP
EOSINOPHIL # BLD AUTO: 0.14 K/UL — SIGNIFICANT CHANGE UP (ref 0–0.5)
EOSINOPHIL NFR BLD AUTO: 3.2 % — SIGNIFICANT CHANGE UP (ref 0–6)
ETHANOL SERPL-MCNC: <10 MG/DL — SIGNIFICANT CHANGE UP (ref 0–9)
GLUCOSE SERPL-MCNC: 93 MG/DL — SIGNIFICANT CHANGE UP (ref 70–99)
GLUCOSE UR QL: NEGATIVE MG/DL — SIGNIFICANT CHANGE UP
HCT VFR BLD CALC: 41.5 % — SIGNIFICANT CHANGE UP (ref 39–50)
HGB BLD-MCNC: 13.3 G/DL — SIGNIFICANT CHANGE UP (ref 13–17)
IMM GRANULOCYTES NFR BLD AUTO: 0.2 % — SIGNIFICANT CHANGE UP (ref 0–1.5)
KETONES UR-MCNC: NEGATIVE — SIGNIFICANT CHANGE UP
LEUKOCYTE ESTERASE UR-ACNC: NEGATIVE — SIGNIFICANT CHANGE UP
LYMPHOCYTES # BLD AUTO: 1.04 K/UL — SIGNIFICANT CHANGE UP (ref 1–3.3)
LYMPHOCYTES # BLD AUTO: 23.8 % — SIGNIFICANT CHANGE UP (ref 13–44)
MCHC RBC-ENTMCNC: 27.1 PG — SIGNIFICANT CHANGE UP (ref 27–34)
MCHC RBC-ENTMCNC: 32 GM/DL — SIGNIFICANT CHANGE UP (ref 32–36)
MCV RBC AUTO: 84.7 FL — SIGNIFICANT CHANGE UP (ref 80–100)
MONOCYTES # BLD AUTO: 0.6 K/UL — SIGNIFICANT CHANGE UP (ref 0–0.9)
MONOCYTES NFR BLD AUTO: 13.7 % — SIGNIFICANT CHANGE UP (ref 2–14)
NEUTROPHILS # BLD AUTO: 2.54 K/UL — SIGNIFICANT CHANGE UP (ref 1.8–7.4)
NEUTROPHILS NFR BLD AUTO: 58.2 % — SIGNIFICANT CHANGE UP (ref 43–77)
NITRITE UR-MCNC: NEGATIVE — SIGNIFICANT CHANGE UP
PH UR: 7 — SIGNIFICANT CHANGE UP (ref 5–8)
PLATELET # BLD AUTO: 228 K/UL — SIGNIFICANT CHANGE UP (ref 150–400)
POTASSIUM SERPL-MCNC: 5 MMOL/L — SIGNIFICANT CHANGE UP (ref 3.5–5.3)
POTASSIUM SERPL-SCNC: 5 MMOL/L — SIGNIFICANT CHANGE UP (ref 3.5–5.3)
PROT SERPL-MCNC: 6.5 G/DL — LOW (ref 6.6–8.7)
PROT UR-MCNC: NEGATIVE — SIGNIFICANT CHANGE UP
RAPID RVP RESULT: SIGNIFICANT CHANGE UP
RBC # BLD: 4.9 M/UL — SIGNIFICANT CHANGE UP (ref 4.2–5.8)
RBC # FLD: 13.3 % — SIGNIFICANT CHANGE UP (ref 10.3–14.5)
SARS-COV-2 RNA SPEC QL NAA+PROBE: SIGNIFICANT CHANGE UP
SODIUM SERPL-SCNC: 141 MMOL/L — SIGNIFICANT CHANGE UP (ref 135–145)
SP GR SPEC: 1 — LOW (ref 1.01–1.02)
TROPONIN T SERPL-MCNC: <0.01 NG/ML — SIGNIFICANT CHANGE UP (ref 0–0.06)
UROBILINOGEN FLD QL: NEGATIVE MG/DL — SIGNIFICANT CHANGE UP
WBC # BLD: 4.37 K/UL — SIGNIFICANT CHANGE UP (ref 3.8–10.5)
WBC # FLD AUTO: 4.37 K/UL — SIGNIFICANT CHANGE UP (ref 3.8–10.5)

## 2022-01-25 PROCEDURE — 85025 COMPLETE CBC W/AUTO DIFF WBC: CPT

## 2022-01-25 PROCEDURE — 36415 COLL VENOUS BLD VENIPUNCTURE: CPT

## 2022-01-25 PROCEDURE — 99285 EMERGENCY DEPT VISIT HI MDM: CPT

## 2022-01-25 PROCEDURE — 99284 EMERGENCY DEPT VISIT MOD MDM: CPT | Mod: 25

## 2022-01-25 PROCEDURE — 0225U NFCT DS DNA&RNA 21 SARSCOV2: CPT

## 2022-01-25 PROCEDURE — 71045 X-RAY EXAM CHEST 1 VIEW: CPT

## 2022-01-25 PROCEDURE — 71045 X-RAY EXAM CHEST 1 VIEW: CPT | Mod: 26

## 2022-01-25 PROCEDURE — 93005 ELECTROCARDIOGRAM TRACING: CPT

## 2022-01-25 PROCEDURE — 80053 COMPREHEN METABOLIC PANEL: CPT

## 2022-01-25 PROCEDURE — 84484 ASSAY OF TROPONIN QUANT: CPT

## 2022-01-25 PROCEDURE — 81003 URINALYSIS AUTO W/O SCOPE: CPT

## 2022-01-25 PROCEDURE — 87086 URINE CULTURE/COLONY COUNT: CPT

## 2022-01-25 PROCEDURE — 93010 ELECTROCARDIOGRAM REPORT: CPT

## 2022-01-25 PROCEDURE — 80307 DRUG TEST PRSMV CHEM ANLYZR: CPT

## 2022-01-25 RX ORDER — ACETAMINOPHEN 500 MG
650 TABLET ORAL ONCE
Refills: 0 | Status: COMPLETED | OUTPATIENT
Start: 2022-01-25 | End: 2022-01-25

## 2022-01-25 RX ORDER — SODIUM CHLORIDE 9 MG/ML
500 INJECTION INTRAMUSCULAR; INTRAVENOUS; SUBCUTANEOUS ONCE
Refills: 0 | Status: COMPLETED | OUTPATIENT
Start: 2022-01-25 | End: 2022-01-25

## 2022-01-25 RX ADMIN — SODIUM CHLORIDE 500 MILLILITER(S): 9 INJECTION INTRAMUSCULAR; INTRAVENOUS; SUBCUTANEOUS at 13:51

## 2022-01-25 RX ADMIN — Medication 650 MILLIGRAM(S): at 13:51

## 2022-01-25 NOTE — ED PROVIDER NOTE - ATTENDING CONTRIBUTION TO CARE
27 y/o M pt with hx of  CP, Intellectual disability, MR, aggressive behavior presents for aggressive behaviour at day program. Pt here cooperative NAD. Pt states he's been having a cough and some chest pain recently.  Unable to identify alleviating or exacerbating factors. Pt denies fevers/chills, ha, loc, focal neuro deficits, sob/palp,  abd pain/n/v/d, urinary symptoms, recent travel and sick contacts.  Const: Awake, alert and oriented. In no acute distress. Well appearing.  HEENT: NC/AT. Moist mucous membranes. No hemotympanum, normal TM bilaterally  Eyes: No scleral icterus. EOMI.  Neck:. Soft and supple. Full ROM without pain.  Cardiac: Regular rate and regular rhythm. +S1/S2. Peripheral pulses 2+ and symmetric. No LE edema.  Resp: Speaking in full sentences. No evidence of respiratory distress. No wheezes, rales or rhonchi.  Abd: Soft, non-tender, non-distended. Normal bowel sounds in all 4 quadrants. No guarding or rebound.  Back: Spine midline and non-tender. No CVAT.  Skin: No rashes, abrasions or lacerations.  Lymph: No cervical lymphadenopathy.  Neuro: Awake, alert & oriented. Moves all extremities symmetrically.  pt low risk MACE heart score 0, no acute ischemic changes on ekg, no acute findings on cxr, at baseline, stable for dc with follow up

## 2022-01-25 NOTE — ED PROVIDER NOTE - PATIENT PORTAL LINK FT
You can access the FollowMyHealth Patient Portal offered by [facility] by registering at the following website: [email or web address]. By joining [employer]’s FollowMyHealth portal, you will also be able to view your health information using other applications (apps) compatible with our system.

## 2022-01-25 NOTE — ED ADULT TRIAGE NOTE - CHIEF COMPLAINT QUOTE
pt BIBA from family service for reports of having a violent outburst, requesting psych eval. pt calm cooperative and denies SI/HI. has hx of autism, ADHD, impulse control disorder as well as intermittent explosive disorder. pt reports he is calmer now. no other complaints.

## 2022-01-25 NOTE — ED ADULT NURSE NOTE - OBJECTIVE STATEMENT
pt brought to ED for aggressive behavoir and outburst. pt arrives to RN calm and cooperative. pt states he was angry but "calmed himself down." pt c/o headache at this time. pt able to ambulate independently without assistance. no obvious signs of injury noted at this time.

## 2022-01-25 NOTE — ED PROVIDER NOTE - CLINICAL SUMMARY MEDICAL DECISION MAKING FREE TEXT BOX
Pt presenting to ED for evaluation of aggressive behavior, now calm and cooperative albeit hyperinteractive which may be pt's baseline. Attempted to reach out to [facility] for additional hx, awaiting call back. Pt with myriad of complaints, will evaluate and medically clear, +/- psych eval, d/w with [facility] staff, reeval.

## 2022-01-25 NOTE — ED PROVIDER NOTE - OBJECTIVE STATEMENT
25 y/o M pt with hx of  CP, Intellectual disability, MR, aggressive behavior presenting to the ED for evaluation for aggressive behavior 27 y/o M pt with hx of  CP, Intellectual disability, MR, aggressive behavior presenting to the ED for evaluation for aggressive behavior. Per triage note, pt had a violent outburst while at his day program. EMS unable to provide details, but pt states he feels better now. Admits to being a "little worked up" earlier. Currently is c/o chest pain cough, dysuria, all of which have been present for days. Pt denies any dyspnea, fevers, n/v/d, abdominal pain, headache, congestion, sore throat, neck pain, back pain, weakness, numbness, tingling, dizziness, syncope, or other complaint.

## 2022-01-25 NOTE — ED PROVIDER NOTE - PROGRESS NOTE DETAILS
Spoke with Lio Carbajal, pt's  over the phone. States he is unable to get in contact with pt's day program to provided details, but given than pt is back to baseline now is ok having him picked up. Will call back with details if he is able to contact day program, otherwise will have staff come pick pt up around 1530 today. - Jakob Bradford, PGY-3

## 2022-01-25 NOTE — ED PROVIDER NOTE - PHYSICAL EXAMINATION
Gen: no acute distress, hyperinteractive  Head: normocephalic, atraumatic  Lung: CTAB, no respiratory distress, no wheezing, rales, rhonchi  CV: normal s1/s2, rrr,   Abd: soft, no tenderness, non-distended  MSK: No edema, no visible deformities, full range of motion in all 4 extremities  Neuro: No focal neurologic deficits  Skin: No rash   Psych: normal affect

## 2022-01-26 LAB
CULTURE RESULTS: SIGNIFICANT CHANGE UP
SPECIMEN SOURCE: SIGNIFICANT CHANGE UP

## 2022-03-16 NOTE — ED ADULT TRIAGE NOTE - PAIN: PRESENCE, MLM
denies pain/discomfort increase Zoloft to 40mg daily liquid; not taking Vyvanse or Atarax at this time

## 2022-03-21 NOTE — ED BEHAVIORAL HEALTH ASSESSMENT NOTE - ADDITIONAL DETAILS / COMMENTS

## 2022-04-02 NOTE — ED CDU PROVIDER INITIAL DAY NOTE - NS ED ROS FT
CONSTITUTIONAL: No fevers, no chills  Eyes: No vision changes  Cardiovascular: No Chest pain  Respiratory: No SOB  Gastrointestinal: No n/v/c/d, no abd pain  Genitourinary: no dysuria, no hematuria  SKIN: no rashes.  MSK: no weakness, no myalgias, no arthralgias  NEURO: no headache, no weakness, no numbness  PSYCHIATRIC: no known mental health issues. 16

## 2022-04-08 NOTE — ED PROVIDER NOTE - COLLISION WITH
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 Plan: OU: Discussed diagnosis in detail with patient. Symptoms with pain in the mornings are consistent with recurrent corneal erosion.  Begin [address] 128 tasia QHS and use warm compresses car Doxepin Counseling:  Patient advised that the medication is sedating and not to drive a car after taking this medication. Patient informed of potential adverse effects including but not limited to dry mouth, urinary retention, and blurry vision.  The patient verbalized understanding of the proper use and possible adverse effects of doxepin.  All of the patient's questions and concerns were addressed.

## 2022-04-13 ENCOUNTER — EMERGENCY (EMERGENCY)
Facility: HOSPITAL | Age: 27
LOS: 1 days | Discharge: HOME | End: 2022-04-13
Attending: EMERGENCY MEDICINE
Payer: MEDICAID

## 2022-04-13 VITALS
OXYGEN SATURATION: 97 % | DIASTOLIC BLOOD PRESSURE: 65 MMHG | TEMPERATURE: 98 F | HEIGHT: 71 IN | HEART RATE: 88 BPM | SYSTOLIC BLOOD PRESSURE: 131 MMHG | WEIGHT: 119.93 LBS | RESPIRATION RATE: 18 BRPM

## 2022-04-13 LAB
ALBUMIN SERPL ELPH-MCNC: 4.7 G/DL — SIGNIFICANT CHANGE UP (ref 3.3–5.2)
ALP SERPL-CCNC: 60 U/L — SIGNIFICANT CHANGE UP (ref 40–120)
ALT FLD-CCNC: 15 U/L — SIGNIFICANT CHANGE UP
ANION GAP SERPL CALC-SCNC: 13 MMOL/L — SIGNIFICANT CHANGE UP (ref 5–17)
AST SERPL-CCNC: 20 U/L — SIGNIFICANT CHANGE UP
BASOPHILS # BLD AUTO: 0.02 K/UL — SIGNIFICANT CHANGE UP (ref 0–0.2)
BASOPHILS NFR BLD AUTO: 0.5 % — SIGNIFICANT CHANGE UP (ref 0–2)
BILIRUB SERPL-MCNC: 0.5 MG/DL — SIGNIFICANT CHANGE UP (ref 0.4–2)
BUN SERPL-MCNC: 11.4 MG/DL — SIGNIFICANT CHANGE UP (ref 8–20)
CALCIUM SERPL-MCNC: 9.3 MG/DL — SIGNIFICANT CHANGE UP (ref 8.6–10.2)
CHLORIDE SERPL-SCNC: 102 MMOL/L — SIGNIFICANT CHANGE UP (ref 98–107)
CO2 SERPL-SCNC: 25 MMOL/L — SIGNIFICANT CHANGE UP (ref 22–29)
CREAT SERPL-MCNC: 0.9 MG/DL — SIGNIFICANT CHANGE UP (ref 0.5–1.3)
EGFR: 122 ML/MIN/1.73M2 — SIGNIFICANT CHANGE UP
EOSINOPHIL # BLD AUTO: 0.07 K/UL — SIGNIFICANT CHANGE UP (ref 0–0.5)
EOSINOPHIL NFR BLD AUTO: 1.9 % — SIGNIFICANT CHANGE UP (ref 0–6)
GLUCOSE SERPL-MCNC: 115 MG/DL — HIGH (ref 70–99)
HCT VFR BLD CALC: 42.2 % — SIGNIFICANT CHANGE UP (ref 39–50)
HGB BLD-MCNC: 13.4 G/DL — SIGNIFICANT CHANGE UP (ref 13–17)
IMM GRANULOCYTES NFR BLD AUTO: 0.3 % — SIGNIFICANT CHANGE UP (ref 0–1.5)
LYMPHOCYTES # BLD AUTO: 0.95 K/UL — LOW (ref 1–3.3)
LYMPHOCYTES # BLD AUTO: 25.3 % — SIGNIFICANT CHANGE UP (ref 13–44)
MCHC RBC-ENTMCNC: 27.3 PG — SIGNIFICANT CHANGE UP (ref 27–34)
MCHC RBC-ENTMCNC: 31.8 GM/DL — LOW (ref 32–36)
MCV RBC AUTO: 86.1 FL — SIGNIFICANT CHANGE UP (ref 80–100)
MONOCYTES # BLD AUTO: 0.46 K/UL — SIGNIFICANT CHANGE UP (ref 0–0.9)
MONOCYTES NFR BLD AUTO: 12.3 % — SIGNIFICANT CHANGE UP (ref 2–14)
NEUTROPHILS # BLD AUTO: 2.24 K/UL — SIGNIFICANT CHANGE UP (ref 1.8–7.4)
NEUTROPHILS NFR BLD AUTO: 59.7 % — SIGNIFICANT CHANGE UP (ref 43–77)
PLATELET # BLD AUTO: 213 K/UL — SIGNIFICANT CHANGE UP (ref 150–400)
POTASSIUM SERPL-MCNC: 4.4 MMOL/L — SIGNIFICANT CHANGE UP (ref 3.5–5.3)
POTASSIUM SERPL-SCNC: 4.4 MMOL/L — SIGNIFICANT CHANGE UP (ref 3.5–5.3)
PROT SERPL-MCNC: 6.3 G/DL — LOW (ref 6.6–8.7)
RAPID RVP RESULT: SIGNIFICANT CHANGE UP
RBC # BLD: 4.9 M/UL — SIGNIFICANT CHANGE UP (ref 4.2–5.8)
RBC # FLD: 13.4 % — SIGNIFICANT CHANGE UP (ref 10.3–14.5)
SARS-COV-2 RNA SPEC QL NAA+PROBE: SIGNIFICANT CHANGE UP
SODIUM SERPL-SCNC: 140 MMOL/L — SIGNIFICANT CHANGE UP (ref 135–145)
WBC # BLD: 3.75 K/UL — LOW (ref 3.8–10.5)
WBC # FLD AUTO: 3.75 K/UL — LOW (ref 3.8–10.5)

## 2022-04-13 PROCEDURE — 0225U NFCT DS DNA&RNA 21 SARSCOV2: CPT

## 2022-04-13 PROCEDURE — 71045 X-RAY EXAM CHEST 1 VIEW: CPT

## 2022-04-13 PROCEDURE — 96374 THER/PROPH/DIAG INJ IV PUSH: CPT

## 2022-04-13 PROCEDURE — 36415 COLL VENOUS BLD VENIPUNCTURE: CPT

## 2022-04-13 PROCEDURE — 80053 COMPREHEN METABOLIC PANEL: CPT

## 2022-04-13 PROCEDURE — 99284 EMERGENCY DEPT VISIT MOD MDM: CPT | Mod: 25

## 2022-04-13 PROCEDURE — 99284 EMERGENCY DEPT VISIT MOD MDM: CPT

## 2022-04-13 PROCEDURE — 71045 X-RAY EXAM CHEST 1 VIEW: CPT | Mod: 26

## 2022-04-13 PROCEDURE — 85025 COMPLETE CBC W/AUTO DIFF WBC: CPT

## 2022-04-13 RX ORDER — SODIUM CHLORIDE 9 MG/ML
1000 INJECTION INTRAMUSCULAR; INTRAVENOUS; SUBCUTANEOUS ONCE
Refills: 0 | Status: COMPLETED | OUTPATIENT
Start: 2022-04-13 | End: 2022-04-13

## 2022-04-13 RX ORDER — ACETAMINOPHEN 500 MG
650 TABLET ORAL ONCE
Refills: 0 | Status: COMPLETED | OUTPATIENT
Start: 2022-04-13 | End: 2022-04-13

## 2022-04-13 RX ORDER — KETOROLAC TROMETHAMINE 30 MG/ML
15 SYRINGE (ML) INJECTION ONCE
Refills: 0 | Status: DISCONTINUED | OUTPATIENT
Start: 2022-04-13 | End: 2022-04-13

## 2022-04-13 RX ADMIN — Medication 650 MILLIGRAM(S): at 13:08

## 2022-04-13 RX ADMIN — Medication 15 MILLIGRAM(S): at 13:08

## 2022-04-13 RX ADMIN — SODIUM CHLORIDE 2000 MILLILITER(S): 9 INJECTION INTRAMUSCULAR; INTRAVENOUS; SUBCUTANEOUS at 13:08

## 2022-04-13 NOTE — ED PROVIDER NOTE - OBJECTIVE STATEMENT
26yom w/ CP p/w multiple complaints. States for a few days he has had pain in his throat, chest, and abdomen, complains of associated cough and diarrhea. Believes someone else was sick in his group home

## 2022-04-13 NOTE — ED PROVIDER NOTE - NSFOLLOWUPINSTRUCTIONS_ED_ALL_ED_FT
Take ibuprofen 400mg every 6 hours and/or acetaminophen 650mg every 6 hours as needed for aches or pains  Return to the ER with any new, worsening or persistent symptoms.

## 2022-04-13 NOTE — ED ADULT NURSE NOTE - CHIEF COMPLAINT QUOTE
C/o abdominal pain. +Nausea. Denies vomiting and diarrhea. Hx of ADHD, autism, and intermittent explosive disorder.

## 2022-04-13 NOTE — ED ADULT TRIAGE NOTE - CHIEF COMPLAINT QUOTE
[facility]  [address] [address]  Phone: [phone]  Fax: [phone] [address]        August 2, 2021     Patient: Tommy Corbin   YOB: 1962   Date of Visit: 8/2/2021       To Whom it May Concern:    Michaelregan Artemio was seen in my clinic on 8/2/2021. She may return to work on 08/03/2021. If you have any questions or concerns, please don't hesitate to call.     Sincerely,         JEANNA Garcia C/o abdominal pain. +Nausea. Denies vomiting and diarrhea. Hx of ADHD, autism, and intermittent explosive disorder.

## 2022-04-13 NOTE — ED PROVIDER NOTE - PATIENT PORTAL LINK FT
You can access the FollowMyHealth Patient Portal offered by [facility] by registering at the following website: [email or web address]. By joining [employer]’s FollowMyHealth portal, you will also be able to view your health information using other applications (apps) compatible with our system.

## 2022-04-13 NOTE — ED PROVIDER NOTE - CLINICAL SUMMARY MEDICAL DECISION MAKING FREE TEXT BOX
Constellation of symptoms likely viral syndrome, clear lungs and benign abdomen on exam, reassuring labs and vitals, resolution of symptoms with conservative measures.

## 2022-05-27 ENCOUNTER — EMERGENCY (EMERGENCY)
Facility: HOSPITAL | Age: 27
LOS: 1 days | Discharge: HOME | End: 2022-05-27
Attending: STUDENT IN AN ORGANIZED HEALTH CARE EDUCATION/TRAINING PROGRAM
Payer: MEDICAID

## 2022-05-27 VITALS
DIASTOLIC BLOOD PRESSURE: 74 MMHG | RESPIRATION RATE: 18 BRPM | WEIGHT: 128.97 LBS | TEMPERATURE: 98 F | OXYGEN SATURATION: 97 % | HEART RATE: 109 BPM | SYSTOLIC BLOOD PRESSURE: 135 MMHG | HEIGHT: 71 IN

## 2022-05-27 PROCEDURE — 99283 EMERGENCY DEPT VISIT LOW MDM: CPT

## 2022-05-27 PROCEDURE — 99282 EMERGENCY DEPT VISIT SF MDM: CPT

## 2022-05-27 NOTE — ED PROVIDER NOTE - CPE EDP MUSC NORM
- - -
Constitutional: + fevers/chills, no sick contacts  Eyes: No visual changes, eye pain or discharge. No photophobia  ENMT: No hearing changes, pain, discharge or infections. No sore throat or drooling.  Neck:  No neck pain or stiffness. No limited ROM  Cardiac: No SOB or edema. + CP  Respiratory: No cough or respiratory distress. No hemoptysis. No history of asthma or RAD  GI: + nausea, vomiting, no diarrhea, +abdominal pain  : No dysuria, frequency or burning. No discharge  MS: + myalgia, muscle weakness, joint pain or back pain  Neuro: No headache or weakness. No LOC  Skin: No skin rash  Endo: no diabetes or thyroid dysfunction  Heme: no abnormal bleeding or clotting  Except as documented in the HPI, all other systems are negative

## 2022-05-27 NOTE — ED PROVIDER NOTE - NS ED ATTENDING STATEMENT MOD
This was a shared visit with the JACI. I reviewed and verified the documentation and independently performed the documented:

## 2022-05-27 NOTE — ED ADULT TRIAGE NOTE - CHIEF COMPLAINT QUOTE
Pt BIBA, ambulatory into triage, c/o pain to right hand to glass, small laceration noted to pinky, from  group home
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
## 2022-05-27 NOTE — ED PROVIDER NOTE - NSFOLLOWUPINSTRUCTIONS_ED_ALL_ED_FT
Continue with OTC pain medication as needed( Ibuprofen )   Followup with Group home Physician / Medical staff
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
## 2022-05-27 NOTE — ED PROVIDER NOTE - PATIENT PORTAL LINK FT
You can access the FollowMyHealth Patient Portal offered by [facility] by registering at the following website: [email or web address]. By joining [employer]’s FollowMyHealth portal, you will also be able to view your health information using other applications (apps) compatible with our system.

## 2022-07-07 ENCOUNTER — EMERGENCY (EMERGENCY)
Facility: HOSPITAL | Age: 27
LOS: 1 days | Discharge: HOME | End: 2022-07-07
Attending: EMERGENCY MEDICINE
Payer: MEDICAID

## 2022-07-07 VITALS
HEIGHT: 71 IN | TEMPERATURE: 98 F | HEART RATE: 97 BPM | OXYGEN SATURATION: 98 % | RESPIRATION RATE: 18 BRPM | SYSTOLIC BLOOD PRESSURE: 130 MMHG | DIASTOLIC BLOOD PRESSURE: 78 MMHG

## 2022-07-07 PROCEDURE — 99284 EMERGENCY DEPT VISIT MOD MDM: CPT

## 2022-07-07 RX ORDER — ACETAMINOPHEN 500 MG
975 TABLET ORAL ONCE
Refills: 0 | Status: COMPLETED | OUTPATIENT
Start: 2022-07-07 | End: 2022-07-07

## 2022-07-07 RX ADMIN — Medication 975 MILLIGRAM(S): at 23:12

## 2022-07-07 NOTE — ED PROVIDER NOTE - PHYSICAL EXAMINATION
Gen: Well appearing in NAD  Head: NC/AT, EOMI  Neck: trachea midline  Card: regular rate and rhythm  Resp:  CTAB  Abd: soft, non-tender  Ext: no deformities  Neuro:  A&Ox3, walking with steady in ED.   Skin:  Warm and dry as visualized, abrasion to right 5th digit  Psych:  Normal affect and mood

## 2022-07-07 NOTE — ED PROVIDER NOTE - OBJECTIVE STATEMENT
27 y/o male with PMHx of Cerebral Palsy, MR, cherelle presents to the ED after he was sent in by group home after he was hitting his head against he wall because he was upset. Pt c/o dizziness and lightheadedness. 25 y/o male with PMHx of Cerebral Palsy, MR, cherelle presents to the ED after he was sent in by group home after he was hitting his head against he wall because he was upset. Pt c/o dizziness and lightheadedness. Pt states he hears voices chronically, telling him now to calm down. 27 y/o male with PMHx of Cerebral Palsy, MR, cherelle presents to the ED after he was sent in by group home after he was hitting his head against he wall because he was upset. Pt c/o dizziness and lightheadedness. Pt states he hears voices chronically, before they were telling him to hit his head, now they are calm and telling him not to hit his head. States he was angry earlier but not anymore.

## 2022-07-07 NOTE — ED PROVIDER NOTE - PATIENT PORTAL LINK FT
You can access the FollowMyHealth Patient Portal offered by [facility] by registering at the following website: [email or web address]. By joining [employer]’s FollowMyHealth portal, you will also be able to view your health information using other applications (apps) compatible with our system.

## 2022-07-07 NOTE — ED PROVIDER NOTE - ATTENDING CONTRIBUTION TO CARE
Daniel: I performed a face to face bedside interview with patient regarding history of present illness, review of symptoms and past medical history. I completed an independent physical exam and ordered tests/medications as needed.  I have discussed patient's plan of care with the resident. The resident assisted in  executing the discussed plan. I was available for any questions or issues that may have arose during the execution of the plan of care.

## 2022-07-07 NOTE — ED PROVIDER NOTE - PROGRESS NOTE DETAILS
Daniel MANZANO: patient feeling better, eating a sandwich and drinking water. Will arrange for transport back to group home. Darwin: No thoughts of self harm, SI, HI in ED. [facility] will transport patient back to group home. Ambulating independently in ED. Tolerating PO. Stable for dc. Patient understands return precautions and f/u.

## 2022-07-07 NOTE — ED ADULT TRIAGE NOTE - CHIEF COMPLAINT QUOTE
pt sent from group home due to head banging and agitation. pt complaining of headache and hearing voices. states "its loud in my head". Hx cerebral palsy, bipolar.

## 2022-07-08 PROCEDURE — 99282 EMERGENCY DEPT VISIT SF MDM: CPT

## 2022-08-20 ENCOUNTER — EMERGENCY (EMERGENCY)
Facility: HOSPITAL | Age: 27
LOS: 1 days | Discharge: HOME | End: 2022-08-20
Attending: STUDENT IN AN ORGANIZED HEALTH CARE EDUCATION/TRAINING PROGRAM
Payer: MEDICAID

## 2022-08-20 VITALS
HEART RATE: 101 BPM | DIASTOLIC BLOOD PRESSURE: 72 MMHG | RESPIRATION RATE: 20 BRPM | WEIGHT: 134.92 LBS | HEIGHT: 71 IN | OXYGEN SATURATION: 98 % | TEMPERATURE: 99 F | SYSTOLIC BLOOD PRESSURE: 145 MMHG

## 2022-08-20 PROCEDURE — 99283 EMERGENCY DEPT VISIT LOW MDM: CPT

## 2022-08-20 NOTE — ED ADULT TRIAGE NOTE - CHIEF COMPLAINT QUOTE
Lacerations to right ankle, no active bleeding noted, patient had episode of aggressiveness, currently calm and cooperative. Patient is a resident of [facility] home.

## 2022-08-20 NOTE — CHART NOTE - NSCHARTNOTEFT_GEN_A_CORE
SW Note: Worker alerted by PA that pt is medically cleared for d/c back to group home at [facility] ([address], [facility]). [facility] and spoke with supervisor Geno, who requested that the pts D/C note be emailed to her at [email or web address]. Physical copies to be sent over as well. Worker contacted patsy (Yanira) and confirmed a lette for transport back (Confirmation number [id number]). Worker requested to set up lette with confirmation number through [facility]. Worker contacted [facility] (Ayala) and arranged a lette for transport. RN to be made aware. Transport letter to be left with transfer packet. No other SW needs at this time.

## 2022-08-20 NOTE — ED PROVIDER NOTE - ATTENDING APP SHARED VISIT CONTRIBUTION OF CARE
Pt sent from group home complaining of small wound to R ankle.    R ankle with minimal superficial abrasion.     plan - wound cleaned and bandaged. will d/c back to group home.

## 2022-08-20 NOTE — ED PROVIDER NOTE - OBJECTIVE STATEMENT
27M resident at [facility] presenting for right ankle wound. Pt otherwise denies fever/chills, c/p, sob, abd pain, n/v/c/d, dysuria, numbness/tingling/weakness and has no other complaints at this time.

## 2022-08-20 NOTE — ED PROVIDER NOTE - PATIENT PORTAL LINK FT
You can access the FollowMyHealth Patient Portal offered by [facility] by registering at the following website: [email or web address]. By joining [employer]’s FollowMyHealth portal, you will also be able to view your health information using other applications (apps) compatible with our system.

## 2022-08-27 ENCOUNTER — EMERGENCY (EMERGENCY)
Facility: HOSPITAL | Age: 27
LOS: 1 days | Discharge: HOME | End: 2022-08-27
Attending: STUDENT IN AN ORGANIZED HEALTH CARE EDUCATION/TRAINING PROGRAM
Payer: MEDICAID

## 2022-08-27 VITALS
TEMPERATURE: 99 F | HEART RATE: 131 BPM | SYSTOLIC BLOOD PRESSURE: 136 MMHG | OXYGEN SATURATION: 96 % | DIASTOLIC BLOOD PRESSURE: 74 MMHG | WEIGHT: 119.93 LBS | RESPIRATION RATE: 20 BRPM | HEIGHT: 71 IN

## 2022-08-27 LAB
ALBUMIN SERPL ELPH-MCNC: 5.2 G/DL — SIGNIFICANT CHANGE UP (ref 3.3–5.2)
ALP SERPL-CCNC: 66 U/L — SIGNIFICANT CHANGE UP (ref 40–120)
ALT FLD-CCNC: 15 U/L — SIGNIFICANT CHANGE UP
ANION GAP SERPL CALC-SCNC: 13 MMOL/L — SIGNIFICANT CHANGE UP (ref 5–17)
APAP SERPL-MCNC: <3 UG/ML — LOW (ref 10–26)
AST SERPL-CCNC: 27 U/L — SIGNIFICANT CHANGE UP
BASOPHILS # BLD AUTO: 0.04 K/UL — SIGNIFICANT CHANGE UP (ref 0–0.2)
BASOPHILS NFR BLD AUTO: 0.4 % — SIGNIFICANT CHANGE UP (ref 0–2)
BILIRUB SERPL-MCNC: 0.6 MG/DL — SIGNIFICANT CHANGE UP (ref 0.4–2)
BUN SERPL-MCNC: 12.5 MG/DL — SIGNIFICANT CHANGE UP (ref 8–20)
CALCIUM SERPL-MCNC: 10.1 MG/DL — SIGNIFICANT CHANGE UP (ref 8.4–10.5)
CHLORIDE SERPL-SCNC: 103 MMOL/L — SIGNIFICANT CHANGE UP (ref 98–107)
CO2 SERPL-SCNC: 26 MMOL/L — SIGNIFICANT CHANGE UP (ref 22–29)
CREAT SERPL-MCNC: 0.97 MG/DL — SIGNIFICANT CHANGE UP (ref 0.5–1.3)
EGFR: 110 ML/MIN/1.73M2 — SIGNIFICANT CHANGE UP
EOSINOPHIL # BLD AUTO: 0.06 K/UL — SIGNIFICANT CHANGE UP (ref 0–0.5)
EOSINOPHIL NFR BLD AUTO: 0.6 % — SIGNIFICANT CHANGE UP (ref 0–6)
ETHANOL SERPL-MCNC: <10 MG/DL — SIGNIFICANT CHANGE UP (ref 0–9)
GLUCOSE SERPL-MCNC: 97 MG/DL — SIGNIFICANT CHANGE UP (ref 70–99)
HCT VFR BLD CALC: 43.5 % — SIGNIFICANT CHANGE UP (ref 39–50)
HGB BLD-MCNC: 13.8 G/DL — SIGNIFICANT CHANGE UP (ref 13–17)
IMM GRANULOCYTES NFR BLD AUTO: 0.3 % — SIGNIFICANT CHANGE UP (ref 0–1.5)
LYMPHOCYTES # BLD AUTO: 1.69 K/UL — SIGNIFICANT CHANGE UP (ref 1–3.3)
LYMPHOCYTES # BLD AUTO: 18 % — SIGNIFICANT CHANGE UP (ref 13–44)
MCHC RBC-ENTMCNC: 27 PG — SIGNIFICANT CHANGE UP (ref 27–34)
MCHC RBC-ENTMCNC: 31.7 GM/DL — LOW (ref 32–36)
MCV RBC AUTO: 85 FL — SIGNIFICANT CHANGE UP (ref 80–100)
MONOCYTES # BLD AUTO: 0.88 K/UL — SIGNIFICANT CHANGE UP (ref 0–0.9)
MONOCYTES NFR BLD AUTO: 9.4 % — SIGNIFICANT CHANGE UP (ref 2–14)
NEUTROPHILS # BLD AUTO: 6.69 K/UL — SIGNIFICANT CHANGE UP (ref 1.8–7.4)
NEUTROPHILS NFR BLD AUTO: 71.3 % — SIGNIFICANT CHANGE UP (ref 43–77)
PLATELET # BLD AUTO: 262 K/UL — SIGNIFICANT CHANGE UP (ref 150–400)
POTASSIUM SERPL-MCNC: 4.9 MMOL/L — SIGNIFICANT CHANGE UP (ref 3.5–5.3)
POTASSIUM SERPL-SCNC: 4.9 MMOL/L — SIGNIFICANT CHANGE UP (ref 3.5–5.3)
PROT SERPL-MCNC: 6.9 G/DL — SIGNIFICANT CHANGE UP (ref 6.6–8.7)
RBC # BLD: 5.12 M/UL — SIGNIFICANT CHANGE UP (ref 4.2–5.8)
RBC # FLD: 14.1 % — SIGNIFICANT CHANGE UP (ref 10.3–14.5)
SALICYLATES SERPL-MCNC: <0.6 MG/DL — LOW (ref 10–20)
SODIUM SERPL-SCNC: 142 MMOL/L — SIGNIFICANT CHANGE UP (ref 135–145)
WBC # BLD: 9.39 K/UL — SIGNIFICANT CHANGE UP (ref 3.8–10.5)
WBC # FLD AUTO: 9.39 K/UL — SIGNIFICANT CHANGE UP (ref 3.8–10.5)

## 2022-08-27 PROCEDURE — 99285 EMERGENCY DEPT VISIT HI MDM: CPT

## 2022-08-27 PROCEDURE — 93010 ELECTROCARDIOGRAM REPORT: CPT

## 2022-08-27 RX ORDER — METOPROLOL TARTRATE 50 MG
12.5 TABLET ORAL
Refills: 0 | Status: DISCONTINUED | OUTPATIENT
Start: 2022-08-27 | End: 2022-09-01

## 2022-08-27 RX ORDER — LORATADINE 10 MG/1
10 TABLET ORAL DAILY
Refills: 0 | Status: DISCONTINUED | OUTPATIENT
Start: 2022-08-27 | End: 2022-09-01

## 2022-08-27 RX ORDER — RISPERIDONE 4 MG/1
6 TABLET ORAL AT BEDTIME
Refills: 0 | Status: DISCONTINUED | OUTPATIENT
Start: 2022-08-27 | End: 2022-09-01

## 2022-08-27 RX ORDER — CHLORPROMAZINE HCL 10 MG
50 TABLET ORAL
Refills: 0 | Status: DISCONTINUED | OUTPATIENT
Start: 2022-08-27 | End: 2022-09-01

## 2022-08-27 RX ORDER — BENZTROPINE MESYLATE 1 MG
1 TABLET ORAL
Refills: 0 | Status: DISCONTINUED | OUTPATIENT
Start: 2022-08-27 | End: 2022-09-01

## 2022-08-27 RX ORDER — LAMOTRIGINE 25 MG/1
200 TABLET, ORALLY DISINTEGRATING ORAL
Refills: 0 | Status: DISCONTINUED | OUTPATIENT
Start: 2022-08-27 | End: 2022-09-01

## 2022-08-27 RX ORDER — FLUTICASONE PROPIONATE 50 MCG
1 SPRAY, SUSPENSION NASAL
Refills: 0 | Status: DISCONTINUED | OUTPATIENT
Start: 2022-08-27 | End: 2022-09-01

## 2022-08-27 RX ADMIN — Medication 1 SPRAY(S): at 00:15

## 2022-08-27 RX ADMIN — Medication 1 MILLIGRAM(S): at 22:37

## 2022-08-27 RX ADMIN — Medication 50 MILLIGRAM(S): at 22:37

## 2022-08-27 RX ADMIN — Medication 12.5 MILLIGRAM(S): at 22:37

## 2022-08-27 RX ADMIN — Medication 1 TABLET(S): at 22:37

## 2022-08-27 RX ADMIN — LAMOTRIGINE 200 MILLIGRAM(S): 25 TABLET, ORALLY DISINTEGRATING ORAL at 22:38

## 2022-08-27 RX ADMIN — Medication 0.3 MILLIGRAM(S): at 22:37

## 2022-08-27 RX ADMIN — RISPERIDONE 6 MILLIGRAM(S): 4 TABLET ORAL at 22:38

## 2022-08-28 LAB
AMPHET UR-MCNC: NEGATIVE — SIGNIFICANT CHANGE UP
APPEARANCE UR: ABNORMAL
BACTERIA # UR AUTO: ABNORMAL
BARBITURATES UR SCN-MCNC: NEGATIVE — SIGNIFICANT CHANGE UP
BENZODIAZ UR-MCNC: NEGATIVE — SIGNIFICANT CHANGE UP
BILIRUB UR-MCNC: NEGATIVE — SIGNIFICANT CHANGE UP
COCAINE METAB.OTHER UR-MCNC: NEGATIVE — SIGNIFICANT CHANGE UP
COLOR SPEC: YELLOW — SIGNIFICANT CHANGE UP
DIFF PNL FLD: NEGATIVE — SIGNIFICANT CHANGE UP
EPI CELLS # UR: SIGNIFICANT CHANGE UP
GLUCOSE UR QL: NEGATIVE MG/DL — SIGNIFICANT CHANGE UP
KETONES UR-MCNC: NEGATIVE — SIGNIFICANT CHANGE UP
LEUKOCYTE ESTERASE UR-ACNC: ABNORMAL
METHADONE UR-MCNC: NEGATIVE — SIGNIFICANT CHANGE UP
NITRITE UR-MCNC: NEGATIVE — SIGNIFICANT CHANGE UP
OPIATES UR-MCNC: NEGATIVE — SIGNIFICANT CHANGE UP
PCP SPEC-MCNC: SIGNIFICANT CHANGE UP
PCP UR-MCNC: NEGATIVE — SIGNIFICANT CHANGE UP
PH UR: 7 — SIGNIFICANT CHANGE UP (ref 5–8)
PROT UR-MCNC: NEGATIVE — SIGNIFICANT CHANGE UP
RBC CASTS # UR COMP ASSIST: NEGATIVE /HPF — SIGNIFICANT CHANGE UP (ref 0–4)
SARS-COV-2 RNA SPEC QL NAA+PROBE: SIGNIFICANT CHANGE UP
SP GR SPEC: 1.01 — SIGNIFICANT CHANGE UP (ref 1.01–1.02)
THC UR QL: NEGATIVE — SIGNIFICANT CHANGE UP
UROBILINOGEN FLD QL: NEGATIVE MG/DL — SIGNIFICANT CHANGE UP
WBC UR QL: ABNORMAL /HPF (ref 0–5)

## 2022-08-28 PROCEDURE — 99218: CPT

## 2022-08-28 PROCEDURE — 99283 EMERGENCY DEPT VISIT LOW MDM: CPT

## 2022-08-28 RX ADMIN — Medication 1 SPRAY(S): at 18:05

## 2022-08-28 RX ADMIN — Medication 12.5 MILLIGRAM(S): at 08:04

## 2022-08-28 RX ADMIN — LAMOTRIGINE 200 MILLIGRAM(S): 25 TABLET, ORALLY DISINTEGRATING ORAL at 09:25

## 2022-08-28 RX ADMIN — Medication 0.3 MILLIGRAM(S): at 07:52

## 2022-08-28 RX ADMIN — Medication 1 MILLIGRAM(S): at 07:51

## 2022-08-28 RX ADMIN — Medication 0.3 MILLIGRAM(S): at 17:59

## 2022-08-28 RX ADMIN — Medication 50 MILLIGRAM(S): at 09:25

## 2022-08-28 RX ADMIN — Medication 1 TABLET(S): at 12:34

## 2022-08-28 RX ADMIN — LAMOTRIGINE 200 MILLIGRAM(S): 25 TABLET, ORALLY DISINTEGRATING ORAL at 18:01

## 2022-08-28 RX ADMIN — Medication 1 MILLIGRAM(S): at 17:59

## 2022-08-28 RX ADMIN — Medication 50 MILLIGRAM(S): at 18:01

## 2022-08-28 RX ADMIN — Medication 1 SPRAY(S): at 09:26

## 2022-08-28 RX ADMIN — Medication 12.5 MILLIGRAM(S): at 17:59
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
## 2022-08-28 NOTE — ED ADULT NURSE NOTE - NSICDXPASTMEDICALHX_GEN_ALL_CORE_FT
(3) no apparent problem PAST MEDICAL HISTORY:  ADHD (attention deficit hyperactivity disorder)     Asthma     Autism     Cerebral palsy     Explosive personality disorder     Mental retardation     Mood disorder     Parkinson disease
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
## 2022-08-28 NOTE — ED ADULT NURSE REASSESSMENT NOTE - NS ED NURSE REASSESS COMMENT FT1
Patient remains stable while in the ED, calm and cooperative, walking in and out of the unit to bathroom, takes his meds without any resistance. Rossana nd evaluated by [facility] and cleared by [facility] but ED MD unable to dc patient because there are no staff to  patient from the group home. VS remains stable.
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
## 2022-08-28 NOTE — ED BEHAVIORAL HEALTH ASSESSMENT NOTE - OTHER
As per chart DR Hudson reports Pt will state he has AH but has never had them. n/a psychosocial; no h/o SA or SIB Group home peers [facility], not under arrest limited chronically poor
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
## 2022-08-28 NOTE — ED ADULT NURSE REASSESSMENT NOTE - NS ED NURSE REASSESS COMMENT FT1
Assumed care to pt, received report from [facility]CE [facility]. Pt resting in bed with even and unlabored breathing,  eating dinner, calm and cooperative.
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
## 2022-08-28 NOTE — ED ADULT NURSE NOTE - NSIMPLEMENTINTERV_GEN_ALL_ED
Implemented All Fall Risk Interventions:  [place] to call system. Call bell, personal items and telephone within reach. Instruct patient to call for assistance. Room bathroom lighting operational. Non-slip footwear when patient is off stretcher. Physically safe environment: no spills, clutter or unnecessary equipment. Stretcher in lowest position, wheels locked, appropriate side rails in place. Provide visual cue, wrist band, yellow gown, etc. Monitor gait and stability. Monitor for mental status changes and reorient to person, place, and time. Review medications for side effects contributing to fall risk. Reinforce activity limits and safety measures with patient and family.

## 2022-08-28 NOTE — ED BEHAVIORAL HEALTH ASSESSMENT NOTE - DESCRIPTION
Pt appears  calm and cooperative, in good spirits. No agitation or aggression since arrival.  He was not in physical distress.  Vital Signs Last 24 Hrs  T(C): 36.7 (28 Aug 2022 07:31), Max: 37.2 (28 Aug 2022 01:54)  T(F): 98.1 (28 Aug 2022 07:31), Max: 98.9 (28 Aug 2022 01:54)  HR: 76 (28 Aug 2022 07:31) (74 - 131)  BP: 118/63 (28 Aug 2022 07:31) (97/62 - 136/74)  BP(mean): 73 (28 Aug 2022 01:54) (73 - 73)  RR: 18 (28 Aug 2022 07:31) (16 - 20)  SpO2: 100% (28 Aug 2022 07:31) (96% - 100%)    Parameters below as of 28 Aug 2022 07:31  Patient On (Oxygen Delivery Method): room air HTN, CP, Asthma, GERD Pt lived with adoptive Aunt prior to group homes
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
## 2022-08-28 NOTE — ED PROVIDER NOTE - OBJECTIVE STATEMENT
27y M w/ hx CP, MR, aggressive behavior, presents from group home for behavioral outburst. Per group home staff, pt got upset today and became aggressive with staff. Pt currently calm and cooperative, denies any complaints. Per staff, pt has had increased episodes of aggression over the past week.

## 2022-08-28 NOTE — ED CDU PROVIDER INITIAL DAY NOTE - GASTROINTESTINAL, MLM
"Assessment/Plan:    Essential hypertension  Doing well.  Not side effects.   - check BMP   - return to recheck in 6 months.     Type 2 diabetes mellitus without complication, without long-term current use of insulin (H)  Anticipate improvement.  He has achieved weight loss.  He is practicing fasting which at times has been helpful.     - check A1c   - continue metformin   - return to clinic based on labs.    Chronic kidney disease, stage 2 (mild)  BMP will likely be normal.  Recheck.      Return in about 3 months (around 12/13/2019) for Diabetes, Hypertension.    Venkata Benson MD  _______________________________    Chief Complaint   Patient presents with     Follow-up     diabetes/weight loss last weight 256     Subjective: Alejandro Crow is a 72 y.o. year old male who returns to clinic for the following chronic complaints/concerns:     Weight / DM:   - \"I lost some weight.\"   - fasting from time to time.  Longest ~48 hours.    -  Able to walk without pain.  Fully rehabilitated.     - \"really strong.\"   - no lightheadedness.  No dizziness.     - practicing mindfulness   - fishing with grandchild.   - no numbness no tingling.      Review of systems is negative except for as shown in the HPI.    The following portions of the patient's history were reviewed and updated as appropriate: allergies, current medications, past medical history and problem list.    Objective:    weight is 243 lb (110.2 kg) (abnormal). His blood pressure is 128/80 and his pulse is 68.   Gen: No acute distress, pleasant.  Cardiac: Regular rate and rhythm, normal S1/S2, no murmurs of the gallops  Respiratory: Clear to auscultation bilaterally.  Psych: Normal affect    No results found for this or any previous visit (from the past 24 hour(s)).    Additional History from Old Records Summarized (2): no  Decision to Obtain Records (1): no  Radiology Tests Summarized or Ordered (1): no  Labs Reviewed or Ordered (1): no  Medicine Test Summarized or " Ordered (1): no  Independent Review of EKG or X-RAY(2 each): no    This note has been dictated using voice recognition software. Any grammatical or context distortions are unintentional and inherent to the software   Abdomen soft, non-tender, no rebound, no guarding.
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
## 2022-08-28 NOTE — ED PROVIDER NOTE - NS ED ROS FT
Constitutional: no fever, no chills  Eyes: no vision changes  ENT: no nasal congestion, no sore throat  CV: no chest pain  Resp: no cough, no shortness of breath  GI: no abdominal pain, no vomiting, no diarrhea  : no dysuria  MSK: no joint pain  Skin: no rash, +abrasion  Neuro: no headache, no focal weakness, no paresthesias

## 2022-08-28 NOTE — ED PROVIDER NOTE - PHYSICAL EXAMINATION
Constitutional: Awake, alert, in no acute distress  Eyes: no scleral icterus  HENT: normocephalic, atraumatic, moist oral mucosa  Neck: supple  CV: RRR, no murmur, 2+ distal pulses in all extremities  Pulm: non-labored respirations, CTAB  Abdomen: soft, non-tender, non-distended  Extremities: no edema, no deformity  Skin: no rash, no jaundice, +superficial abrasion R forearm  Neuro: AAOx3, moving all extremities equally

## 2022-08-28 NOTE — ED BEHAVIORAL HEALTH ASSESSMENT NOTE - CURRENT MEDICATION
Per previous notes:  Cogentin 1 mg bid, Metoprolol 25 mg 1/2 tab bid, Lamictal 200 mg bid, Risperdal 3 mg 2 tabs at hs, Thorazine 50mg bid, Clonidine 0,2 hs

## 2022-08-29 VITALS
OXYGEN SATURATION: 100 % | HEART RATE: 73 BPM | SYSTOLIC BLOOD PRESSURE: 106 MMHG | DIASTOLIC BLOOD PRESSURE: 58 MMHG | RESPIRATION RATE: 18 BRPM | TEMPERATURE: 98 F

## 2022-08-29 PROCEDURE — 81001 URINALYSIS AUTO W/SCOPE: CPT

## 2022-08-29 PROCEDURE — 94640 AIRWAY INHALATION TREATMENT: CPT

## 2022-08-29 PROCEDURE — 93005 ELECTROCARDIOGRAM TRACING: CPT

## 2022-08-29 PROCEDURE — 36415 COLL VENOUS BLD VENIPUNCTURE: CPT

## 2022-08-29 PROCEDURE — 99285 EMERGENCY DEPT VISIT HI MDM: CPT

## 2022-08-29 PROCEDURE — G0378: CPT

## 2022-08-29 PROCEDURE — U0005: CPT

## 2022-08-29 PROCEDURE — 80307 DRUG TEST PRSMV CHEM ANLYZR: CPT

## 2022-08-29 PROCEDURE — 85025 COMPLETE CBC W/AUTO DIFF WBC: CPT

## 2022-08-29 PROCEDURE — U0003: CPT

## 2022-08-29 PROCEDURE — 80053 COMPREHEN METABOLIC PANEL: CPT

## 2022-08-29 PROCEDURE — 99217: CPT

## 2022-08-29 RX ADMIN — LAMOTRIGINE 200 MILLIGRAM(S): 25 TABLET, ORALLY DISINTEGRATING ORAL at 06:33

## 2022-08-29 RX ADMIN — Medication 1 SPRAY(S): at 06:34

## 2022-08-29 RX ADMIN — Medication 1 MILLIGRAM(S): at 06:33

## 2022-08-29 RX ADMIN — Medication 12.5 MILLIGRAM(S): at 06:32

## 2022-08-29 RX ADMIN — Medication 50 MILLIGRAM(S): at 06:33

## 2022-08-29 RX ADMIN — Medication 0.3 MILLIGRAM(S): at 06:33

## 2022-08-29 NOTE — ED ADULT NURSE REASSESSMENT NOTE - NS ED NURSE REASSESS COMMENT FT1
Pt resting in bed with even and unlabored breathing , a&ox3.NAD. VSS. sitting up eating breakfast safety measures maintained

## 2022-08-29 NOTE — ED CDU PROVIDER DISPOSITION NOTE - PATIENT PORTAL LINK FT
You can access the FollowMyHealth Patient Portal offered by [facility] by registering at the following website: [email or web address]. By joining [employer]’s FollowMyHealth portal, you will also be able to view your health information using other applications (apps) compatible with our system.
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
## 2022-08-29 NOTE — CHART NOTE - NSCHARTNOTEFT_GEN_A_CORE
ESTEBAN Note: SW aware that pt is medically and behaviorally cleared to return to his group home. ESTEBAN placed call to  [facility] [phone] to discuss, Moira states they have adequate staff and are able to accept pt back today, staff requesting SW arrange margareth for pt to return home, confirmed address on fs is address to group home, states there is a ramp to enter house. ESTEBAN emailed d/c papers to [email or web address] as requested.    ESTEBAN requested ambulance pickup via  clerical, RN aware of need to give report to EMS for BH safety questions, no other SW services identified

## 2022-09-17 ENCOUNTER — EMERGENCY (EMERGENCY)
Facility: HOSPITAL | Age: 27
LOS: 1 days | Discharge: HOME | End: 2022-09-17
Attending: EMERGENCY MEDICINE
Payer: MEDICAID

## 2022-09-17 VITALS
RESPIRATION RATE: 22 BRPM | TEMPERATURE: 98 F | DIASTOLIC BLOOD PRESSURE: 89 MMHG | OXYGEN SATURATION: 98 % | SYSTOLIC BLOOD PRESSURE: 150 MMHG | HEIGHT: 71 IN | HEART RATE: 115 BPM | WEIGHT: 128.97 LBS

## 2022-09-17 VITALS
DIASTOLIC BLOOD PRESSURE: 68 MMHG | OXYGEN SATURATION: 98 % | SYSTOLIC BLOOD PRESSURE: 107 MMHG | HEART RATE: 97 BPM | RESPIRATION RATE: 19 BRPM | TEMPERATURE: 98 F

## 2022-09-17 DIAGNOSIS — F84.0 AUTISTIC DISORDER: ICD-10-CM

## 2022-09-17 DIAGNOSIS — F79 UNSPECIFIED INTELLECTUAL DISABILITIES: ICD-10-CM

## 2022-09-17 LAB
ALBUMIN SERPL ELPH-MCNC: 4.9 G/DL — SIGNIFICANT CHANGE UP (ref 3.3–5.2)
ALP SERPL-CCNC: 62 U/L — SIGNIFICANT CHANGE UP (ref 40–120)
ALT FLD-CCNC: 14 U/L — SIGNIFICANT CHANGE UP
ANION GAP SERPL CALC-SCNC: 12 MMOL/L — SIGNIFICANT CHANGE UP (ref 5–17)
APAP SERPL-MCNC: <3 UG/ML — LOW (ref 10–26)
AST SERPL-CCNC: 18 U/L — SIGNIFICANT CHANGE UP
BASOPHILS # BLD AUTO: 0.06 K/UL — SIGNIFICANT CHANGE UP (ref 0–0.2)
BASOPHILS NFR BLD AUTO: 1.6 % — SIGNIFICANT CHANGE UP (ref 0–2)
BILIRUB SERPL-MCNC: 0.4 MG/DL — SIGNIFICANT CHANGE UP (ref 0.4–2)
BUN SERPL-MCNC: 13.6 MG/DL — SIGNIFICANT CHANGE UP (ref 8–20)
CALCIUM SERPL-MCNC: 9.7 MG/DL — SIGNIFICANT CHANGE UP (ref 8.4–10.5)
CHLORIDE SERPL-SCNC: 104 MMOL/L — SIGNIFICANT CHANGE UP (ref 98–107)
CK MB CFR SERPL CALC: 1.7 NG/ML — SIGNIFICANT CHANGE UP (ref 0–6.7)
CK SERPL-CCNC: 234 U/L — HIGH (ref 30–200)
CO2 SERPL-SCNC: 26 MMOL/L — SIGNIFICANT CHANGE UP (ref 22–29)
CREAT SERPL-MCNC: 0.81 MG/DL — SIGNIFICANT CHANGE UP (ref 0.5–1.3)
EGFR: 125 ML/MIN/1.73M2 — SIGNIFICANT CHANGE UP
EOSINOPHIL # BLD AUTO: 0.1 K/UL — SIGNIFICANT CHANGE UP (ref 0–0.5)
EOSINOPHIL NFR BLD AUTO: 2.7 % — SIGNIFICANT CHANGE UP (ref 0–6)
ETHANOL SERPL-MCNC: <10 MG/DL — SIGNIFICANT CHANGE UP (ref 0–9)
GLUCOSE SERPL-MCNC: 93 MG/DL — SIGNIFICANT CHANGE UP (ref 70–99)
HCT VFR BLD CALC: 42.6 % — SIGNIFICANT CHANGE UP (ref 39–50)
HGB BLD-MCNC: 13.7 G/DL — SIGNIFICANT CHANGE UP (ref 13–17)
IMM GRANULOCYTES NFR BLD AUTO: 0.3 % — SIGNIFICANT CHANGE UP (ref 0–0.9)
LYMPHOCYTES # BLD AUTO: 0.96 K/UL — LOW (ref 1–3.3)
LYMPHOCYTES # BLD AUTO: 25.5 % — SIGNIFICANT CHANGE UP (ref 13–44)
MCHC RBC-ENTMCNC: 27.2 PG — SIGNIFICANT CHANGE UP (ref 27–34)
MCHC RBC-ENTMCNC: 32.2 GM/DL — SIGNIFICANT CHANGE UP (ref 32–36)
MCV RBC AUTO: 84.5 FL — SIGNIFICANT CHANGE UP (ref 80–100)
MONOCYTES # BLD AUTO: 0.54 K/UL — SIGNIFICANT CHANGE UP (ref 0–0.9)
MONOCYTES NFR BLD AUTO: 14.4 % — HIGH (ref 2–14)
NEUTROPHILS # BLD AUTO: 2.09 K/UL — SIGNIFICANT CHANGE UP (ref 1.8–7.4)
NEUTROPHILS NFR BLD AUTO: 55.5 % — SIGNIFICANT CHANGE UP (ref 43–77)
PLATELET # BLD AUTO: 206 K/UL — SIGNIFICANT CHANGE UP (ref 150–400)
POTASSIUM SERPL-MCNC: 4.9 MMOL/L — SIGNIFICANT CHANGE UP (ref 3.5–5.3)
POTASSIUM SERPL-SCNC: 4.9 MMOL/L — SIGNIFICANT CHANGE UP (ref 3.5–5.3)
PROT SERPL-MCNC: 6.5 G/DL — LOW (ref 6.6–8.7)
RAPID RVP RESULT: SIGNIFICANT CHANGE UP
RBC # BLD: 5.04 M/UL — SIGNIFICANT CHANGE UP (ref 4.2–5.8)
RBC # FLD: 14.2 % — SIGNIFICANT CHANGE UP (ref 10.3–14.5)
SALICYLATES SERPL-MCNC: <0.6 MG/DL — LOW (ref 10–20)
SARS-COV-2 RNA SPEC QL NAA+PROBE: SIGNIFICANT CHANGE UP
SODIUM SERPL-SCNC: 142 MMOL/L — SIGNIFICANT CHANGE UP (ref 135–145)
TSH SERPL-MCNC: 0.73 UIU/ML — SIGNIFICANT CHANGE UP (ref 0.27–4.2)
WBC # BLD: 3.76 K/UL — LOW (ref 3.8–10.5)
WBC # FLD AUTO: 3.76 K/UL — LOW (ref 3.8–10.5)

## 2022-09-17 PROCEDURE — 85025 COMPLETE CBC W/AUTO DIFF WBC: CPT

## 2022-09-17 PROCEDURE — 99284 EMERGENCY DEPT VISIT MOD MDM: CPT

## 2022-09-17 PROCEDURE — 82550 ASSAY OF CK (CPK): CPT

## 2022-09-17 PROCEDURE — 36415 COLL VENOUS BLD VENIPUNCTURE: CPT

## 2022-09-17 PROCEDURE — 80307 DRUG TEST PRSMV CHEM ANLYZR: CPT

## 2022-09-17 PROCEDURE — 80053 COMPREHEN METABOLIC PANEL: CPT

## 2022-09-17 PROCEDURE — 84443 ASSAY THYROID STIM HORMONE: CPT

## 2022-09-17 PROCEDURE — 99285 EMERGENCY DEPT VISIT HI MDM: CPT

## 2022-09-17 PROCEDURE — 93010 ELECTROCARDIOGRAM REPORT: CPT

## 2022-09-17 PROCEDURE — 93005 ELECTROCARDIOGRAM TRACING: CPT

## 2022-09-17 PROCEDURE — 0225U NFCT DS DNA&RNA 21 SARSCOV2: CPT

## 2022-09-17 PROCEDURE — 82553 CREATINE MB FRACTION: CPT

## 2022-09-17 PROCEDURE — 90792 PSYCH DIAG EVAL W/MED SRVCS: CPT

## 2022-09-17 RX ORDER — CEPHALEXIN 500 MG
250 CAPSULE ORAL
Refills: 0 | Status: DISCONTINUED | OUTPATIENT
Start: 2022-09-17 | End: 2022-09-17
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
## 2022-09-17 NOTE — ED ADULT TRIAGE NOTE - CHIEF COMPLAINT QUOTE
PT accompanied by SCPD 1186 pt states he wasn't being a good boy today and was punching and throwing things at the group home where he lives, pt co pain to his right hand. pt punched a light socket and threw a computer screen as staff. as per SCP pt gets aggressive with female staff. PT accompanied by SCPD 1186 pt states he wasn't being a good boy today and was punching and throwing things at the group home where he lives, pt co pain to his right hand. pt punched a light socket and threw a computer screen as staff. as per SCP pt gets aggressive with female staff. pt calm and cooperative at triage PT accompanied by SCPD 1186 pt states he wasn't being a good boy today and was punching and throwing things at the group home where he lives, pt co pain to his right hand. pt punched a light socket and threw a computer screen as staff. as per SCP pt gets aggressive with female staff. pt calm and cooperative at triage. pt denies any SI/HI AOX3

## 2022-09-17 NOTE — ED ADULT TRIAGE NOTE - LAW ENFORCEMENT NAME
Problem: Communication  Goal: The ability to communicate needs accurately and effectively will improve  Outcome: PROGRESSING AS EXPECTED  Discuss POC with pt, update POC on whiteboard, instruct the use of the call light to communicate needs with RN and CNA's.       Problem: Knowledge Deficit  Goal: Knowledge of disease process/condition, treatment plan, diagnostic tests, and medications will improve  Outcome: PROGRESSING AS EXPECTED  Educated pt on POC, activities, encouraging pt to ask questions, providing answerers to pt questions, educating pt about medications, encouraging pt evolvement in care process, and continuing with current POC.         
Problem: Safety  Goal: Will remain free from injury  Outcome: PROGRESSING AS EXPECTED  Reviewed patient's mobility status, discussed with care team of patient needs, verifying appropriate safety precautions in place, providing patient education, ensuring call lights are within reach, non-slip socks in use, evaluating needs alarms & monitoring every shift, continuing with current plan of care.      Problem: Knowledge Deficit  Goal: Knowledge of disease process/condition, treatment plan, diagnostic tests, and medications will improve  Outcome: PROGRESSING AS EXPECTED  Educated patient about POC, activities, encouraging patient to ask questions, providing answers to patient's questions, educating patient about medications, encouraging patient involvement in care process. Continuing with current POC.        
[facility]

## 2022-09-17 NOTE — ED BEHAVIORAL HEALTH ASSESSMENT NOTE - SUMMARY
Patient a 27 y/p male, domiciled in a group home,  psychiatric hx of MR, Autism, no drug, no self mutilation, no medical issues was bib/EMS activated by staff as she was aggressive at the group home.    Patient in bed, alert, smiling at times, inappropriate, limited answers to questions, endorses that he would behave well, would like to go home and did mention that he was aggressive at group home. As per staff at the group home she mentions that he hit a staff etc. and was send in here for evaluation. No aggression noted, smiles and somewhat pre-occupied to go home. He was here in the past and was discharged. Spoke with supervisor Moira at [phone] and explained to her that patient to be discharged back to group home. There were no instances of self Mutilation noted for the patient. No aggression noted, no prn medication needed. He mentions that he has good sleep/appetite.    Plan: Discharge back to group home          No meds changes

## 2022-09-17 NOTE — ED BEHAVIORAL HEALTH ASSESSMENT NOTE - HPI (INCLUDE ILLNESS QUALITY, SEVERITY, DURATION, TIMING, CONTEXT, MODIFYING FACTORS, ASSOCIATED SIGNS AND SYMPTOMS)
Patient a 27 y/p male, domiciled in a group home,  psychiatric hx of MR, Autism, no drug, no self mutilation, no medical issues was bib/EMS activated by staff as she was aggressive at the group home.    Patient in bed, alert, smiling at times, inappropriate, limited answers to questions, endorses that he would behave well, would like to go home and did mention that he was aggressive at group home. As per staff at the group home she mentions that he hit a staff etc. and was send in here for evaluation. No aggression noted, smiles and somewhat pre-occupied to go home. He was here in the past and was discharged. Spoke with supervisor Moira at [phone] and explained to her that patient to be discharged back to group home. There were no instances of self Mutilation noted for the patient. No aggression noted, no prn medication needed. He mentions that he has good sleep/appetite.

## 2022-09-17 NOTE — ED ADULT NURSE NOTE - HPI (INCLUDE ILLNESS QUALITY, SEVERITY, DURATION, TIMING, CONTEXT, MODIFYING FACTORS, ASSOCIATED SIGNS AND SYMPTOMS)
Patient agitated at group residence. Has multiple ED visits for similar behaviors in the past. Childlike and hyperverbal during intake, difficult to understand at times due to his pressured speech and Cerebral Palsy. Denied current thoughts to harm self/others. Psychiatrist evaluated patient and deemed pt at baseline and could be discharged home. In good behavioral control during ED visit.

## 2022-09-17 NOTE — ED PROVIDER NOTE - NS ED MD DISPO DISCHARGE
Patient reports that her feet are numb but this is her usual numbness.ambulated patient around dept with assist. Steady gait with no difficulty walking noted   Other

## 2022-09-17 NOTE — CHART NOTE - NSCHARTNOTEFT_GEN_A_CORE
SW informed by Dr. Rowe patient is psychiatrically cleared for discharge. Dr. Rowe spoke with Moira, Manager at [facility] [phone] who has accepted patient for return. Moira reports they do not have the staffing to come pick patient up and requested patient transported home. Referral sent through [employer] to [facility] to arrange ambulette for discharge. SW received call from Leonel at [facility], [phone] who reports as patient was seen for psychiatric reasons, patient will require an ambulance. All information confirmed. Ambulance confirmed for next available . Dr. Rowe and RN advised of same. No further SW needs at this time.

## 2022-09-17 NOTE — ED ADULT NURSE NOTE - NSIMPLEMENTINTERV_GEN_ALL_ED
Implemented All Universal Safety Interventions:  [place] to call system. Call bell, personal items and telephone within reach. Instruct patient to call for assistance. Room bathroom lighting operational. Non-slip footwear when patient is off stretcher. Physically safe environment: no spills, clutter or unnecessary equipment. Stretcher in lowest position, wheels locked, appropriate side rails in place.

## 2022-09-17 NOTE — ED PROVIDER NOTE - OBJECTIVE STATEMENT
28 y/o M with h/o MR, cerebral palsy , autism sent from group home for episode of aggression and was hitting staff and hit the laptops and light switches. Pt is calm here and admits to aggression and states that he didn't wanted to take medication and does not remember which medication. No fever, chills, fall or trauma. No nausea, vomiting

## 2022-09-17 NOTE — ED PROVIDER NOTE - PATIENT PORTAL LINK FT
You can access the FollowMyHealth Patient Portal offered by [facility] by registering at the following website: [email or web address]. By joining [facility]’s FollowMyHealth portal, you will also be able to view your health information using other applications (apps) compatible with our system.

## 2022-09-17 NOTE — ED PROVIDER NOTE - NEURO NEGATIVE STATEMENT, MLM
no loss of consciousness, no gait abnormality, no headache, no sensory deficits, and no weakness. baseline delay and cognitive issues

## 2022-09-20 NOTE — ED PROVIDER NOTE - CPE EDP SKIN NORM
Cardiology Outpatient Follow-Up Note - Roxana Kapoor 62 y o  female MRN: [id number]      Assessment/Plan:  1  Chronic diastolic congestive heart failure (HCC)  Her weights have been stable on the present diuretic regimen  It is hard to discern how symptomatic she is from diastolic CHF with how advanced her lung disease is  We will maintain present therapy for now, torsemide 60 mg b i d   Continue potassium 20 mEq b i d     We will recheck an echo to reassess RVSP and general cardiac function  Check BMP as she has been on this new dose of torsemide for some time  - Echo complete w/ contrast if indicated; Future  - Basic metabolic panel    2  Pulmonary hypertension ([facility] [address] )  Probably mixed group 2 due to left-sided heart disease in group 3 due to chronic lung disease  Would not recommend pulmonary vasodilators at this time given left-sided heart disease  Recheck echo  - Echo complete w/ contrast if indicated; Future    3  Paroxysmal atrial fibrillation ([facility] [address] )  She is in atrial fibrillation, was not even aware  Flecainide therapy therefore not effective  We will pursue more aggressive rate control  Stop flecainide, increase metoprolol XL to 100 mg daily   - POCT ECG    4  Chronic respiratory failure with hypoxia (HCC)    5  Essential hypertension  - metoprolol succinate (TOPROL-XL) 100 mg 24 hr tablet; Take 1 tablet (100 mg total) by mouth daily  Dispense: 30 tablet; Refill: 11      We will see Mattie gomez in 3 months for routine follow-up  Subjective:     HPI: Roxana Kapoor is a 62y o  year old female who presented to me initially on 9/9/21 for further evaluation of CHF with preserved LVEF, pulmonary hypertension by echo estimation, and atrial fibrillation on flecainide therapy  She also has chronic hypoxic respiratory failure due to asthma COPD overlap syndrome and is followed by Pulmonology       I referred her for RHC which was performed on 9/30/21   The study showed severely elevated right (RAP 20mmHg) and left (PCWP 30mmHg) sided filling pressure  There was severe post-capillary pulmonary hypertension (PAP 80/38/52) with a PVR of 2 7WU  Cardiac output was normal  The study was suggestive of primarily group II pulmonary hypertension due to left sided heart disease and elevated filling pressure       After her RHC her torsemide was increased from 40mg BID to 60mg BID  She had not noticed much of a difference  She has significant baseline RICCI  She still rests frequently after minimal exertion due to dyspnea  We had subsequently booster with metolazone for 5 days and plan to see her back to follow up for improvement  She did miss her scheduled follow-up and now cannot remember if the metolazone boosting significantly change her symptoms  She has remained on torsemide 60 mg b i d  since our last visit  She has persistent severe dyspnea with minimal exertion, wears oxygen chronically  In regards to her atrial fibrillation, she does not think she feels her atrial fibrillation  She thinks every now and then she might feel her heart running fast, but no significant palpitations  ROS:  Review of Systems:  Review of Systems   Constitutional: Negative for activity change and fatigue  HENT: Negative for facial swelling  Eyes: Negative for visual disturbance  Respiratory: Negative for chest tightness and shortness of breath  Cardiovascular: Negative for chest pain, palpitations and leg swelling  Gastrointestinal: Negative for nausea and vomiting  Musculoskeletal: Negative for myalgias  Skin: Negative for pallor  Allergic/Immunologic: Negative for immunocompromised state  Neurological: Negative for dizziness, syncope and light-headedness  Psychiatric/Behavioral: Negative for agitation and confusion  The patient is not nervous/anxious              Objective:     Vitals:   Vitals:    09/20/22 1427   BP: 122/76   BP Location: Right arm   Patient Position: Sitting   Cuff Size: Large   SpO2: 90%   Weight: 129 kg (285 lb 3 2 oz)   Height: 5' 3" (1 6 m)    Body surface area is 2 25 meters squared  Wt Readings from Last 3 Encounters:   09/20/22 129 kg (285 lb 3 2 oz)   08/17/22 131 kg (289 lb)   07/28/22 131 kg (289 lb)       Physical Exam:  General: Israel Wray is a chronically ill morbidly obese female, sitting in a wheelchair, with nasal cannula oxygen  HEENT: moist mucous membranes, EOMI  Neck:  Unable to assess JVD, supple, trachea midline  Cardiovascular:  Distant S1/2, irregularly irregular rhythm, tachycardia, no significant murmurs  Pulmonary: normal respiratory effort, on nasal cannula oxygen, distant lung sounds, shallow breaths  Abdomen: soft and nondistended  Extremities:  Trace lower extremity edema  Warm and well perfused extremities  Neuro: no focal motor deficits, AAOx3 (person, place, time)  Psych: Normal mood and affect, cooperative      Medications (at the START of this encounter):   Outpatient Medications Prior to Visit   Medication Sig Dispense Refill    albuterol (2 5 mg/3 mL) 0 083 % nebulizer solution Take 3 mL (2 5 mg total) by nebulization every 6 (six) hours as needed for wheezing or shortness of breath 1080 mL 3    albuterol (ProAir HFA) 90 mcg/act inhaler Inhale 2 puffs every 6 (six) hours as needed for wheezing or shortness of breath 8 5 g 3    albuterol (PROVENTIL HFA,VENTOLIN HFA) 90 mcg/act inhaler Inhale 2 puffs every 4 (four) hours as needed for wheezing 1 Inhaler 4    ascorbic acid (VITAMIN C) 1000 MG tablet Take 1,000 mg by mouth daily      atorvastatin (LIPITOR) 40 mg tablet TAKE 1 TABLET BY MOUTH EVERY DAY 30 tablet 5    atorvastatin (LIPITOR) 80 mg tablet TAKE 1/2 TABLET DAILY 90 tablet 1    Blood Glucose Monitoring Suppl (CareWire CONTOUR NEXT MONITOR) w/Device KIT by Does not apply route daily      Cholecalciferol 1000 units tablet Take 1,000 Units by mouth daily      Contour Next Test test strip USE TO TEST BLOOD SUGAR 3 TIMES A  strip 3    [employer] Lancets Thin 26G MISC USE 3 (THREE) TIMES A  each 5    cyanocobalamin (VITAMIN B-12) 100 mcg tablet Take by mouth daily      Eliquis 5 MG TAKE 1 TABLET BY MOUTH TWICE A DAY 60 tablet 5    escitalopram (LEXAPRO) 20 mg tablet TAKE 1 TABLET BY MOUTH EVERY DAY 90 tablet 0    ferrous sulfate 220 (44 Fe) mg/5 mL solution TAKE 5 ML (220 MG TOTAL) BY MOUTH 2 (TWO) TIMES A DAY BEFORE MEALS 473 mL 2    flecainide (TAMBOCOR) 100 mg tablet TAKE 1 TABLET BY MOUTH EVERY 12 HOURS 60 tablet 5    Flovent  MCG/ACT inhaler INHALE 2 PUFFS 2 (TWO) TIMES A DAY RINSE MOUTH AFTER USE  12 g 6    fluticasone (FLONASE) 50 mcg/act nasal spray 1 spray into each nostril 2 (two) times a day 1 Bottle 3    glycopyrrolate-formoterol (BEVESPI AEROSPHERE) 9-4 8 MCG/ACT inhaler Inhale 2 puffs 2 (two) times a day 10 7 g 5    insulin lispro (HumaLOG) 100 units/mL injection pen INJECT 12 UNITS UNDER THE SKIN 3 (THREE) TIMES A DAY WITH MEALS 30 mL 1    Insulin Pen Needle (BD Pen Needle Love 2nd Gen) 32G X 4 MM MISC Use daily at bedtime Use 4 new needles daily   400 each 3    Klor-Con M20 20 MEQ tablet TAKE 1 TABLET BY MOUTH 2 TIMES A DAY   60 tablet 5    Lantus SoloStar 100 units/mL SOPN INJECT 28 UNITS UNDER THE SKIN DAILY AT BEDTIME 30 mL 1    levalbuterol (XOPENEX) 1 25 mg/0 5 mL nebulizer solution Take 0 5 mL (1 25 mg total) by nebulization 2 (two) times a day 60 vial 0    loratadine (CLARITIN) 10 mg tablet Take by mouth      LORazepam (ATIVAN) 0 5 mg tablet TAKE 2 TABLETS BY MOUTH AT BEDTIME AND 1 EVERY 6 HOURS AS NEEDED FOR ANXIETY 90 tablet 0    Magnesium 400 MG CAPS Take by mouth 1 BID      metFORMIN (GLUCOPHAGE-XR) 500 mg 24 hr tablet TAKE 2 TABLETS BY MOUTH TWICE A DAY WITH FOOD 360 tablet 1    metolazone (ZAROXOLYN) 5 mg tablet Take 1 tablet (5 mg total) by mouth daily 5 tablet 0    metoprolol succinate (TOPROL-XL) 50 mg 24 hr tablet TAKE 1 TABLET BY MOUTH EVERY DAY 90 tablet 1    mometasone (ELOCON) 0 1 % cream Apply topically daily for 7 days 45 g 0    montelukast (SINGULAIR) 10 mg tablet TAKE 1 TABLET BY MOUTH DAILY AT BEDTIME 90 tablet 3    naloxone (NARCAN) 4 mg/0 1 mL nasal spray Administer 1 spray into a nostril  If breathing does not return to normal or if breathing difficulty resumes after 2-3 minutes, give another dose in the other nostril using a new spray  1 each 1    nystatin (MYCOSTATIN) cream Apply topically 2 (two) times a day for 10 days 30 g 0    omeprazole (PriLOSEC) 40 MG capsule Take 1 capsule (40 mg total) by mouth daily 90 capsule 3    potassium chloride (K-DUR,KLOR-CON) 20 mEq tablet Take 1 tablet (20 mEq total) by mouth 2 (two) times a day for 3 days In addition to standing dose  6 tablet 0    torsemide (DEMADEX) 20 mg tablet Take 3 tablets (60 mg total) by mouth 2 (two) times a day 180 tablet 5    traZODone (DESYREL) 150 mg tablet TAKE 1 TABLET BY MOUTH EVERYDAY AT BEDTIME 90 tablet 0    Trulicity 2 78 SR/8 0ZC SOPN INJECT 0 5 MILLILITERS UNDER THE SKIN ONE TIME PER WEEK 2 mL 5     No facility-administered medications prior to visit  Labs & Results:        EKG personally reviewed:  EKG in the office today shows atrial fibrillation, 100 bpm, abnormal study  Time Spent:  Total time (face-to-face and non-face-to-face) spent on today's visit was 20 minutes  This includes preparation for the visits (i e  reviewing test results), performance of a medically appropriate history and examination, and orders for medications, tests or other procedures  This time is exclusive of procedures performed and time spent teaching  This note was completed in part utilizing [employer] direct voice recognition software  Grammatical errors, random word insertion, spelling mistakes, occasional wrong word or "sound-alike" substitutions and incomplete sentences may be an occasional consequence of the system secondary to software limitations, ambient noise and hardware issues   At the time of dictation, efforts were made to edit, clarify and /or correct errors  Please read the chart carefully and recognize, using context, where substitutions have occurred  If you have any questions or concerns about the context, text or information contained within the body of this dictation, please contact myself, the provider, for further clarification  normal...

## 2022-10-16 ENCOUNTER — INPATIENT (INPATIENT)
Facility: HOSPITAL | Age: 27
LOS: 1 days | Discharge: HOME | DRG: 641 | End: 2022-10-18
Attending: INTERNAL MEDICINE | Admitting: STUDENT IN AN ORGANIZED HEALTH CARE EDUCATION/TRAINING PROGRAM
Payer: MEDICAID

## 2022-10-16 VITALS
DIASTOLIC BLOOD PRESSURE: 93 MMHG | HEIGHT: 71 IN | SYSTOLIC BLOOD PRESSURE: 155 MMHG | OXYGEN SATURATION: 97 % | TEMPERATURE: 98 F | HEART RATE: 157 BPM | RESPIRATION RATE: 22 BRPM

## 2022-10-16 DIAGNOSIS — A41.9 SEPSIS, UNSPECIFIED ORGANISM: ICD-10-CM

## 2022-10-16 LAB
ALBUMIN SERPL ELPH-MCNC: 5.5 G/DL — HIGH (ref 3.3–5.2)
ALP SERPL-CCNC: 71 U/L — SIGNIFICANT CHANGE UP (ref 40–120)
ALT FLD-CCNC: 20 U/L — SIGNIFICANT CHANGE UP
ANION GAP SERPL CALC-SCNC: 16 MMOL/L — SIGNIFICANT CHANGE UP (ref 5–17)
APPEARANCE UR: CLEAR — SIGNIFICANT CHANGE UP
APTT BLD: 34.4 SEC — SIGNIFICANT CHANGE UP (ref 27.5–35.5)
AST SERPL-CCNC: 24 U/L — SIGNIFICANT CHANGE UP
BACTERIA # UR AUTO: ABNORMAL
BASOPHILS # BLD AUTO: 0.05 K/UL — SIGNIFICANT CHANGE UP (ref 0–0.2)
BASOPHILS NFR BLD AUTO: 1 % — SIGNIFICANT CHANGE UP (ref 0–2)
BILIRUB SERPL-MCNC: 0.3 MG/DL — LOW (ref 0.4–2)
BILIRUB UR-MCNC: NEGATIVE — SIGNIFICANT CHANGE UP
BUN SERPL-MCNC: 12.1 MG/DL — SIGNIFICANT CHANGE UP (ref 8–20)
CALCIUM SERPL-MCNC: 10.1 MG/DL — SIGNIFICANT CHANGE UP (ref 8.4–10.5)
CHLORIDE SERPL-SCNC: 104 MMOL/L — SIGNIFICANT CHANGE UP (ref 98–107)
CO2 SERPL-SCNC: 28 MMOL/L — SIGNIFICANT CHANGE UP (ref 22–29)
COLOR SPEC: YELLOW — SIGNIFICANT CHANGE UP
CREAT SERPL-MCNC: 1.07 MG/DL — SIGNIFICANT CHANGE UP (ref 0.5–1.3)
DIFF PNL FLD: NEGATIVE — SIGNIFICANT CHANGE UP
EGFR: 98 ML/MIN/1.73M2 — SIGNIFICANT CHANGE UP
EOSINOPHIL # BLD AUTO: 0.06 K/UL — SIGNIFICANT CHANGE UP (ref 0–0.5)
EOSINOPHIL NFR BLD AUTO: 1.3 % — SIGNIFICANT CHANGE UP (ref 0–6)
EPI CELLS # UR: SIGNIFICANT CHANGE UP
GLUCOSE SERPL-MCNC: 92 MG/DL — SIGNIFICANT CHANGE UP (ref 70–99)
GLUCOSE UR QL: NEGATIVE MG/DL — SIGNIFICANT CHANGE UP
HCT VFR BLD CALC: 46 % — SIGNIFICANT CHANGE UP (ref 39–50)
HGB BLD-MCNC: 14.4 G/DL — SIGNIFICANT CHANGE UP (ref 13–17)
IMM GRANULOCYTES NFR BLD AUTO: 0.2 % — SIGNIFICANT CHANGE UP (ref 0–0.9)
INR BLD: 1.1 RATIO — SIGNIFICANT CHANGE UP (ref 0.88–1.16)
KETONES UR-MCNC: NEGATIVE — SIGNIFICANT CHANGE UP
LACTATE BLDV-MCNC: 5.7 MMOL/L — CRITICAL HIGH (ref 0.5–2)
LACTATE BLDV-MCNC: 6.1 MMOL/L — CRITICAL HIGH (ref 0.5–2)
LACTATE SERPL-SCNC: 3 MMOL/L — HIGH (ref 0.5–2)
LACTATE SERPL-SCNC: 3.1 MMOL/L — HIGH (ref 0.5–2)
LACTATE SERPL-SCNC: 3.6 MMOL/L — HIGH (ref 0.5–2)
LEUKOCYTE ESTERASE UR-ACNC: ABNORMAL
LYMPHOCYTES # BLD AUTO: 1.13 K/UL — SIGNIFICANT CHANGE UP (ref 1–3.3)
LYMPHOCYTES # BLD AUTO: 23.7 % — SIGNIFICANT CHANGE UP (ref 13–44)
MCHC RBC-ENTMCNC: 27.1 PG — SIGNIFICANT CHANGE UP (ref 27–34)
MCHC RBC-ENTMCNC: 31.3 GM/DL — LOW (ref 32–36)
MCV RBC AUTO: 86.5 FL — SIGNIFICANT CHANGE UP (ref 80–100)
MONOCYTES # BLD AUTO: 0.58 K/UL — SIGNIFICANT CHANGE UP (ref 0–0.9)
MONOCYTES NFR BLD AUTO: 12.2 % — SIGNIFICANT CHANGE UP (ref 2–14)
NEUTROPHILS # BLD AUTO: 2.94 K/UL — SIGNIFICANT CHANGE UP (ref 1.8–7.4)
NEUTROPHILS NFR BLD AUTO: 61.6 % — SIGNIFICANT CHANGE UP (ref 43–77)
NITRITE UR-MCNC: NEGATIVE — SIGNIFICANT CHANGE UP
PH UR: 6.5 — SIGNIFICANT CHANGE UP (ref 5–8)
PLATELET # BLD AUTO: 260 K/UL — SIGNIFICANT CHANGE UP (ref 150–400)
POTASSIUM SERPL-MCNC: 5 MMOL/L — SIGNIFICANT CHANGE UP (ref 3.5–5.3)
POTASSIUM SERPL-SCNC: 5 MMOL/L — SIGNIFICANT CHANGE UP (ref 3.5–5.3)
PROT SERPL-MCNC: 7.1 G/DL — SIGNIFICANT CHANGE UP (ref 6.6–8.7)
PROT UR-MCNC: 30 MG/DL
PROTHROM AB SERPL-ACNC: 12.8 SEC — SIGNIFICANT CHANGE UP (ref 10.5–13.4)
RAPID RVP RESULT: SIGNIFICANT CHANGE UP
RBC # BLD: 5.32 M/UL — SIGNIFICANT CHANGE UP (ref 4.2–5.8)
RBC # FLD: 13.8 % — SIGNIFICANT CHANGE UP (ref 10.3–14.5)
RBC CASTS # UR COMP ASSIST: SIGNIFICANT CHANGE UP /HPF (ref 0–4)
SARS-COV-2 RNA SPEC QL NAA+PROBE: SIGNIFICANT CHANGE UP
SODIUM SERPL-SCNC: 148 MMOL/L — HIGH (ref 135–145)
SP GR SPEC: 1.01 — SIGNIFICANT CHANGE UP (ref 1.01–1.02)
TROPONIN T SERPL-MCNC: <0.01 NG/ML — SIGNIFICANT CHANGE UP (ref 0–0.06)
UROBILINOGEN FLD QL: NEGATIVE MG/DL — SIGNIFICANT CHANGE UP
WBC # BLD: 4.77 K/UL — SIGNIFICANT CHANGE UP (ref 3.8–10.5)
WBC # FLD AUTO: 4.77 K/UL — SIGNIFICANT CHANGE UP (ref 3.8–10.5)
WBC UR QL: SIGNIFICANT CHANGE UP /HPF (ref 0–5)

## 2022-10-16 PROCEDURE — 99223 1ST HOSP IP/OBS HIGH 75: CPT

## 2022-10-16 PROCEDURE — 70450 CT HEAD/BRAIN W/O DYE: CPT | Mod: 26,MA

## 2022-10-16 PROCEDURE — 93010 ELECTROCARDIOGRAM REPORT: CPT

## 2022-10-16 PROCEDURE — 71045 X-RAY EXAM CHEST 1 VIEW: CPT | Mod: 26

## 2022-10-16 PROCEDURE — 99291 CRITICAL CARE FIRST HOUR: CPT

## 2022-10-16 RX ORDER — SODIUM CHLORIDE 9 MG/ML
2200 INJECTION INTRAMUSCULAR; INTRAVENOUS; SUBCUTANEOUS ONCE
Refills: 0 | Status: COMPLETED | OUTPATIENT
Start: 2022-10-16 | End: 2022-10-16

## 2022-10-16 RX ORDER — ACETAMINOPHEN 500 MG
650 TABLET ORAL ONCE
Refills: 0 | Status: DISCONTINUED | OUTPATIENT
Start: 2022-10-16 | End: 2022-10-16

## 2022-10-16 RX ORDER — LAMOTRIGINE 25 MG/1
200 TABLET, ORALLY DISINTEGRATING ORAL
Refills: 0 | Status: DISCONTINUED | OUTPATIENT
Start: 2022-10-16 | End: 2022-10-18

## 2022-10-16 RX ORDER — METOPROLOL TARTRATE 50 MG
1 TABLET ORAL
Qty: 0 | Refills: 0 | DISCHARGE

## 2022-10-16 RX ORDER — CHLORPROMAZINE HCL 10 MG
50 TABLET ORAL
Refills: 0 | Status: DISCONTINUED | OUTPATIENT
Start: 2022-10-16 | End: 2022-10-18

## 2022-10-16 RX ORDER — METOPROLOL TARTRATE 50 MG
12.5 TABLET ORAL
Refills: 0 | Status: DISCONTINUED | OUTPATIENT
Start: 2022-10-16 | End: 2022-10-18

## 2022-10-16 RX ORDER — MULTIVIT-MIN/FERROUS GLUCONATE 9 MG/15 ML
1 LIQUID (ML) ORAL
Qty: 0 | Refills: 0 | DISCHARGE

## 2022-10-16 RX ORDER — CHLORPROMAZINE HCL 10 MG
1 TABLET ORAL
Qty: 0 | Refills: 0 | DISCHARGE

## 2022-10-16 RX ORDER — AZITHROMYCIN 500 MG/1
500 TABLET, FILM COATED ORAL ONCE
Refills: 0 | Status: COMPLETED | OUTPATIENT
Start: 2022-10-16 | End: 2022-10-16

## 2022-10-16 RX ORDER — MAGNESIUM SULFATE 500 MG/ML
2 VIAL (ML) INJECTION ONCE
Refills: 0 | Status: COMPLETED | OUTPATIENT
Start: 2022-10-16 | End: 2022-10-16

## 2022-10-16 RX ORDER — BENZTROPINE MESYLATE 1 MG
1 TABLET ORAL
Refills: 0 | Status: DISCONTINUED | OUTPATIENT
Start: 2022-10-16 | End: 2022-10-18

## 2022-10-16 RX ORDER — CEFTRIAXONE 500 MG/1
1000 INJECTION, POWDER, FOR SOLUTION INTRAMUSCULAR; INTRAVENOUS ONCE
Refills: 0 | Status: COMPLETED | OUTPATIENT
Start: 2022-10-16 | End: 2022-10-16

## 2022-10-16 RX ORDER — IPRATROPIUM/ALBUTEROL SULFATE 18-103MCG
3 AEROSOL WITH ADAPTER (GRAM) INHALATION ONCE
Refills: 0 | Status: COMPLETED | OUTPATIENT
Start: 2022-10-16 | End: 2022-10-16

## 2022-10-16 RX ORDER — ACETAMINOPHEN 500 MG
1000 TABLET ORAL ONCE
Refills: 0 | Status: COMPLETED | OUTPATIENT
Start: 2022-10-16 | End: 2022-10-16

## 2022-10-16 RX ORDER — RISPERIDONE 4 MG/1
0 TABLET ORAL
Qty: 0 | Refills: 0 | DISCHARGE

## 2022-10-16 RX ADMIN — Medication 1 MILLIGRAM(S): at 18:23

## 2022-10-16 RX ADMIN — Medication 12.5 MILLIGRAM(S): at 18:21

## 2022-10-16 RX ADMIN — SODIUM CHLORIDE 2200 MILLILITER(S): 9 INJECTION INTRAMUSCULAR; INTRAVENOUS; SUBCUTANEOUS at 11:00

## 2022-10-16 RX ADMIN — Medication 1000 MILLIGRAM(S): at 20:35

## 2022-10-16 RX ADMIN — AZITHROMYCIN 255 MILLIGRAM(S): 500 TABLET, FILM COATED ORAL at 13:35

## 2022-10-16 RX ADMIN — Medication 400 MILLIGRAM(S): at 18:23

## 2022-10-16 RX ADMIN — Medication 3 MILLILITER(S): at 11:59

## 2022-10-16 RX ADMIN — Medication 50 MILLIGRAM(S): at 18:21

## 2022-10-16 RX ADMIN — SODIUM CHLORIDE 2200 MILLILITER(S): 9 INJECTION INTRAMUSCULAR; INTRAVENOUS; SUBCUTANEOUS at 13:00

## 2022-10-16 RX ADMIN — LAMOTRIGINE 200 MILLIGRAM(S): 25 TABLET, ORALLY DISINTEGRATING ORAL at 18:21

## 2022-10-16 RX ADMIN — Medication 150 GRAM(S): at 11:26

## 2022-10-16 RX ADMIN — Medication 125 MILLIGRAM(S): at 11:07

## 2022-10-16 RX ADMIN — CEFTRIAXONE 100 MILLIGRAM(S): 500 INJECTION, POWDER, FOR SOLUTION INTRAMUSCULAR; INTRAVENOUS at 11:59

## 2022-10-16 NOTE — ED PROVIDER NOTE - ATTENDING CONTRIBUTION TO CARE
Jeannine MANZANO- 28 Y/O M with h/p CP, MR, bipolar disorder , htn, asthma p/w fever, chills, dysuria and loss of appetite for 2-3 days and today he fell and hit his head and has been feeling sob. Pt has mild cough and is vaccinated and boosted for covid. No nausea, vomiting but is sweating profusely and feels very dehydrated and weak. Pt lives in a group home and able to provide his own history as well.    Pt is alert, sick appearing male with febrile to touch and chills and tachycardia, b/l poor aeration,  mild tachypnea, abd soft, nt, nd, neuro exam aox3, cn 2-12 intact, baseline cognitive deficits from MR, skin warm, sweaty and febrile to touch with good cap refill, dry oral mucosa, abrasion on both legs form fall, no scalp hematoma ot step off    plan to treat as sepsis, treat for acute asthma, will cover for pna and uti and admit, will do ct head for fall and hit to head

## 2022-10-16 NOTE — H&P ADULT - NSHPPHYSICALEXAM_GEN_ALL_CORE
PHYSICAL EXAM:    General: in no acute distress  Eyes: PERRLA, EOMI; conjunctiva and sclera clear  Head: Normocephalic; atraumatic  ENMT: No nasal discharge; airway clear  Neck: Supple; non tender; no masses  Respiratory: No wheezes, rales or rhonchi  Cardiovascular: Regular rate and rhythm. S1 and S2 Normal; No murmurs, gallops or rubs  Gastrointestinal: Soft non-tender non-distended; Normal bowel sounds  Genitourinary: No costovertebral angle tenderness  Extremities: Normal range of motion, No clubbing, cyanosis or edema  Vascular: Peripheral pulses palpable 2+ bilaterally  Neurological: Alert and oriented x4  Skin: Warm and dry. No acute rash  Lymph Nodes: No acute cervical adenopathy  Musculoskeletal: Normal gait, tone, without deformities  Psychiatric: Cooperative and appropriate PHYSICAL EXAM:  General: in no acute distress  Eyes: PERRLA, EOMI; conjunctiva and sclera clear  Head: Normocephalic; atraumatic  ENMT: No nasal discharge; airway clear  Neck: Supple; non tender; no masses  Respiratory: No wheezes, rales or rhonchi  Cardiovascular: Regular rate and rhythm. S1 and S2 Normal; No murmurs, gallops or rubs  Gastrointestinal: Soft non-tender non-distended; Normal bowel sounds  Extremities: Normal range of motion, No clubbing, cyanosis or edema; scratches on both shins no signs of cellulitis  Vascular: Peripheral pulses palpable 2+ bilaterally  Neurological: Alert and oriented x4  Skin: Warm and dry. No acute rash  Lymph Nodes: No acute cervical adenopathy  Musculoskeletal: Normal gait, tone, without deformities  Psychiatric: Cooperative and appropriate

## 2022-10-16 NOTE — ED PROVIDER NOTE - NSCAREINITIATED _GEN_ER
Juan Marques(Resident) Erivedge Counseling- I discussed with the patient the risks of Erivedge including but not limited to nausea, vomiting, diarrhea, constipation, weight loss, changes in the sense of taste, decreased appetite, muscle spasms, and hair loss.  The patient verbalized understanding of the proper use and possible adverse effects of Erivedge.  All of the patient's questions and concerns were addressed.

## 2022-10-16 NOTE — H&P ADULT - ASSESSMENT
28 yo male with CP and behavioral issues on thorazine and lamotrigine who presents from group home after a fall. While in the ED he had a fever and tachycardia. No other SIRs, no clear source of infection. Patient complained to the ED of shortness of breath. Not tachypneic and not on O2. He was noted to have elevated lactate. No hypotension. Patient started on azithromycin and ceftriaxone for suspected UTI/Pneumonia (no infiltrate on CXR and UA bland). Medicine called to admit.    At this time patient able to communicate and states that he only has some epigastric discomfort, didn't LOC on fall, didn't hit his head. Scraped both knees.    #SIRS; rule out sepsis  #elevated lactate  - admit to medicine  - continue fluids  - hold abx until culture results  - check CBC in the AM  - monitor clinically for symptoms    #CP with behavioral disturbances  - continue thorazine  - continue lamotrigine    #DVT ppx  - SCD  - ambulation    DISPO: anticipate quick turn around back to group home if ruled out for sepsis.

## 2022-10-16 NOTE — ED ADULT TRIAGE NOTE - CHIEF COMPLAINT QUOTE
PT BIBA from assisted living. Per EMS, pt was uncooperative at facility, refusing to eat breakfast. PT states SOB and difficulty breathing. States he fell. small scrapes noted to B/L lower extremities. PT anxious appearing

## 2022-10-16 NOTE — H&P ADULT - HISTORY OF PRESENT ILLNESS
26 yo male with CP and behavioral issues on thorazine and lamotrigine who presents from group home after a fall. While in the ED he had a fever and tachycardia. No other SIRs, no clear source of infection. Patient complained to the ED of shortness of breath. Not tachypneic and not on O2. He was noted to have elevated lactate. No hypotension. Patient started on azithromycin and ceftriaxone for suspected UTI/Pneumonia (no infiltrate on CXR and UA bland). Medicine called to admit.    At this time patient able to communicate and states that he only has some epigastric discomfort, didn't LOC on fall, didn't hit his head. Scraped both knees.

## 2022-10-16 NOTE — ED ADULT NURSE NOTE - OBJECTIVE STATEMENT
Pt is alert and oriented. Pt states that he fell at his group home and hit his head and twisted his left ankle. Pt has an abrasion to the bilateral shins. Pt states that he has pain in his bilateral shins and let ankle. Pt states that he has been having sob and burning with urination. Pt denies sob, chest pain, nausea, vomiting, and dizziness. Pt resp are even and unlabored, skin color pita for race. Pt updated on plan of care. Pt resting on cm.

## 2022-10-16 NOTE — ED ADULT NURSE NOTE - NSIMPLEMENTINTERV_GEN_ALL_ED
Implemented All Fall Risk Interventions:  [place] to call system. Call bell, personal items and telephone within reach. Instruct patient to call for assistance. Room bathroom lighting operational. Non-slip footwear when patient is off stretcher. Physically safe environment: no spills, clutter or unnecessary equipment. Stretcher in lowest position, wheels locked, appropriate side rails in place. Provide visual cue, wrist band, yellow gown, etc. Monitor gait and stability. Monitor for mental status changes and reorient to person, place, and time. Review medications for side effects contributing to fall risk. Reinforce activity limits and safety measures with patient and family.

## 2022-10-16 NOTE — ED PROVIDER NOTE - PHYSICAL EXAMINATION
General: NAD, well appearing  HEENT: Normocephalic, atraumatic  Neck: No apparent stiffness or JVD, no midline tenderness  Pulm: Chest wall symmetric and nontender, lungs clear to ascultation   Cardiac: Fast rate and regular rhythm  Abdomen: Nontender and nondistended  Skin: Skin is warm, dry and intact without rashes or lesions.  Neuro: No motor or sensory deficits above reported baseline  MSK: No deformity or tenderness

## 2022-10-16 NOTE — ED PROVIDER NOTE - OBJECTIVE STATEMENT
28 y/o M with h/o MR, cerebral palsy, autism presents from group home with reports of 3 days of feeling unwell, weakness, decreased appetite, SOB, and dysuria.  Patient fell and skinned his knee, states he hit his head.  Patient limited historian but reportedly at baseline.

## 2022-10-16 NOTE — PATIENT PROFILE ADULT - STATED REASON FOR ADMISSION
[FreeTextEntry1] : 9/4/2020\par JESSICA BOUCHER is a 34 year old male being called for a HIV follow-up visit. Patient is taking Biktarvy daily.  Patient states he has had high blood pressure for years starting age 13 with elevated BP. Seen by pediatric cardiologist Dr. Burgess at [facility]. needs to see cardiology ASAP . \par Pt would like to hold off on restarting  Atenolol at 25 mg daily. Pt declines flu vaccine\par Plan: \par 1) Continue Biktarvy \par DiazePAM 2 MG Oral Tablet; 1 or 2 tabs po q12 hrs prn anxiety MDD:8mg\par Escitalopram Oxalate 5 MG Oral Tablet; 1 tab po daily\par Vitamin D daily\par 2) needs CARDIOLOGY ASAP\par 3) needs labs see in 6 months\par 4) needs social work today\par 5) treat presuptively with Ceftriaxone 250mg and azithromycin ( pt states does not feel Augmenting ever caused a rash or allergic reaction) \par \par 10/14/2019----JESSICA BOUCHER is a 33 year old male being seen for a HIV follow-up visit. Multiple partners. Needs to check allergy first. all labs today see in 4 months. treat presumtivly with azithromycin and ceftriaxone. Pt seeing Adonis Gamboa for anxiety and depression. No family Hx of colon Cancer. Pt states allergy to augmentin long time ago but did not think it was anything other than rash?and states not sure if it was an allergy to the medication as he was ill at the time. \par  \par History of Present Illness\par 33 male with H/O PCP PNA, diagnosed after with HIV , off prophylaxis\par On Biktarvy for HIV and no PPx, says he is complaint with his ART. Will miss a dose here and there due to him being a  and travels to [place] on a regular basis. Patient seen on 2/15/19 with acute Varicella rash all over his body. confirmed by swab PCR. Treated with Valtrex. \par In addition to the Varicella, he underwent STI screening and his anal chlamydia and GC were both positive. He was treated with IM CTX and azithro x1 and 3 weeks doxy but only completed 2 weeks as he developed "stomach bug" with severe vomiting which self resolved. \par \par He returns today for follow up. He still complains of some off and on burning with urination. No fevers, chills, flank or abd pain.\par \par Sexual History: The patient is sexually active and is not monogamous. The patient has oral and anal intercourse with men. \par Occupation: . \par Lives with mom and sister in grandmothers home but going to be evicted soon \par  \par \par  \par Active Problems\par Acquired immune deficiency syndrome (042) (B20)\par Acute otitis media (382.9) (H66.90)\par Anxiety as acute reaction to exceptional stress (308.0) (F41.1,F43.0)\par Bilateral impacted cerumen (380.4) (H61.23)\par Chlamydia infection (079.98) (A74.9)\par Conjunctivitis (372.30) (H10.9)\par Diarrhea (787.91) (R19.7)\par Drug rash (693.0) (L27.0)\par Elevated serum creatinine (790.99) (R79.89)\par Essential hypertension (401.9) (I10)\par Exposure to communicable diseases (V01.9) (Z20.9)\par Gonorrhea (098.0) (A54.9)\par Panic attack (300.01) (F41.0)\par 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)\par Pharyngitis (462) (J02.9)\par Pneumonia (486) (J18.9)\par Rectal/anal ulcer (569.41) (K62.6)\par Skin infection (686.9) (L08.9)\par Testicular swelling (608.86) (N50.89)\par Urticaria, acute (708.8) (L50.8)\par UTI (urinary tract infection) (599.0) (N39.0)\par Vitamin D deficiency (268.9) (E55.9)\par \par Past Medical History\par History of complete eye exam (V15.89) (Z92.89)\par History of dental examination (V15.89) (Z92.89)\par History of hypertension (V12.59) (Z86.79)\par History of HIV (human immunodeficiency virus infection) (V08) (B20)\par \par Current Meds\par Atenolol 25mg\par Biktarvy -25 MG Oral Tablet; TAKE ONE TABLET BY MOUTH DAILY\par DiazePAM 2 MG Oral Tablet; 1 or 2 tabs po q12 hrs prn anxiety MDD:8mg\par Escitalopram Oxalate 5 MG Oral Tablet; 1 tab po daily\par Vitamin D daily\par \par Allergies\par Augmentin TABS ( possible rash) \par Codeine Derivatives\par \par Review of Systems\par \par Genitourinary: as noted in HPI. \par Constitutional, Eyes, ENT, Cardiovascular, Respiratory, Gastrointestinal, Musculoskeletal, Integumentary, Neurological, Psychiatric and Heme/Lymph are otherwise negative. \par \par 
fall

## 2022-10-16 NOTE — ED PROVIDER NOTE - CLINICAL SUMMARY MEDICAL DECISION MAKING FREE TEXT BOX
28 y/o M with h/o MR, cerebral palsy, autism presents with fever and concern for infection, Patient given IV fluids and abx medication.  EKG labs and imaging performed to evaluate the patient.

## 2022-10-17 LAB
ANION GAP SERPL CALC-SCNC: 13 MMOL/L — SIGNIFICANT CHANGE UP (ref 5–17)
BUN SERPL-MCNC: 14.7 MG/DL — SIGNIFICANT CHANGE UP (ref 8–20)
CALCIUM SERPL-MCNC: 9.6 MG/DL — SIGNIFICANT CHANGE UP (ref 8.4–10.5)
CHLORIDE SERPL-SCNC: 104 MMOL/L — SIGNIFICANT CHANGE UP (ref 98–107)
CO2 SERPL-SCNC: 25 MMOL/L — SIGNIFICANT CHANGE UP (ref 22–29)
CREAT SERPL-MCNC: 0.87 MG/DL — SIGNIFICANT CHANGE UP (ref 0.5–1.3)
EGFR: 122 ML/MIN/1.73M2 — SIGNIFICANT CHANGE UP
GLUCOSE SERPL-MCNC: 92 MG/DL — SIGNIFICANT CHANGE UP (ref 70–99)
HCT VFR BLD CALC: 43.1 % — SIGNIFICANT CHANGE UP (ref 39–50)
HGB BLD-MCNC: 13.4 G/DL — SIGNIFICANT CHANGE UP (ref 13–17)
LACTATE SERPL-SCNC: 1.2 MMOL/L — SIGNIFICANT CHANGE UP (ref 0.5–2)
LACTATE SERPL-SCNC: 4.7 MMOL/L — CRITICAL HIGH (ref 0.5–2)
MCHC RBC-ENTMCNC: 27.3 PG — SIGNIFICANT CHANGE UP (ref 27–34)
MCHC RBC-ENTMCNC: 31.1 GM/DL — LOW (ref 32–36)
MCV RBC AUTO: 87.8 FL — SIGNIFICANT CHANGE UP (ref 80–100)
PLATELET # BLD AUTO: 275 K/UL — SIGNIFICANT CHANGE UP (ref 150–400)
POTASSIUM SERPL-MCNC: 4.8 MMOL/L — SIGNIFICANT CHANGE UP (ref 3.5–5.3)
POTASSIUM SERPL-SCNC: 4.8 MMOL/L — SIGNIFICANT CHANGE UP (ref 3.5–5.3)
RBC # BLD: 4.91 M/UL — SIGNIFICANT CHANGE UP (ref 4.2–5.8)
RBC # FLD: 13.9 % — SIGNIFICANT CHANGE UP (ref 10.3–14.5)
SODIUM SERPL-SCNC: 142 MMOL/L — SIGNIFICANT CHANGE UP (ref 135–145)
WBC # BLD: 10.23 K/UL — SIGNIFICANT CHANGE UP (ref 3.8–10.5)
WBC # FLD AUTO: 10.23 K/UL — SIGNIFICANT CHANGE UP (ref 3.8–10.5)

## 2022-10-17 PROCEDURE — 99232 SBSQ HOSP IP/OBS MODERATE 35: CPT

## 2022-10-17 RX ORDER — SODIUM CHLORIDE 9 MG/ML
1000 INJECTION INTRAMUSCULAR; INTRAVENOUS; SUBCUTANEOUS ONCE
Refills: 0 | Status: COMPLETED | OUTPATIENT
Start: 2022-10-17 | End: 2022-10-17

## 2022-10-17 RX ORDER — KETOROLAC TROMETHAMINE 30 MG/ML
15 SYRINGE (ML) INJECTION ONCE
Refills: 0 | Status: DISCONTINUED | OUTPATIENT
Start: 2022-10-17 | End: 2022-10-17

## 2022-10-17 RX ADMIN — Medication 15 MILLIGRAM(S): at 03:48

## 2022-10-17 RX ADMIN — SODIUM CHLORIDE 1000 MILLILITER(S): 9 INJECTION INTRAMUSCULAR; INTRAVENOUS; SUBCUTANEOUS at 04:30

## 2022-10-17 RX ADMIN — Medication 1 MILLIGRAM(S): at 05:15

## 2022-10-17 RX ADMIN — Medication 50 MILLIGRAM(S): at 05:15

## 2022-10-17 RX ADMIN — LAMOTRIGINE 200 MILLIGRAM(S): 25 TABLET, ORALLY DISINTEGRATING ORAL at 05:16

## 2022-10-17 RX ADMIN — Medication 12.5 MILLIGRAM(S): at 19:02

## 2022-10-17 RX ADMIN — Medication 15 MILLIGRAM(S): at 02:48

## 2022-10-17 RX ADMIN — Medication 12.5 MILLIGRAM(S): at 05:16

## 2022-10-17 RX ADMIN — Medication 50 MILLIGRAM(S): at 19:00

## 2022-10-17 RX ADMIN — LAMOTRIGINE 200 MILLIGRAM(S): 25 TABLET, ORALLY DISINTEGRATING ORAL at 19:01

## 2022-10-17 RX ADMIN — Medication 1 MILLIGRAM(S): at 19:02

## 2022-10-17 NOTE — PROGRESS NOTE ADULT - SUBJECTIVE AND OBJECTIVE BOX
CC:     INTERVAL HPI/OVERNIGHT EVENTS:    Vital Signs Last 24 Hrs  T(C): 36.7 (17 Oct 2022 15:26), Max: 37 (17 Oct 2022 11:33)  T(F): 98 (17 Oct 2022 15:), Max: 98.6 (17 Oct 2022 11:33)  HR: 98 (17 Oct 2022 15:26) (77 - 118)  BP: 131/66 (17 Oct 2022 15:26) (111/66 - 147/89)  BP(mean): --  RR: 17 (17 Oct 2022 15:26) (16 - 18)  SpO2: 97% (17 Oct 2022 15:) (97% - 99%)    Parameters below as of 17 Oct 2022 15:  Patient On (Oxygen Delivery Method): room air    PHYSICAL EXAM:  General: in no acute distress  Eyes: PERRLA, EOMI; conjunctiva and sclera clear  Head: Normocephalic; atraumatic  ENMT: No nasal discharge; airway clear  Neck: Supple; non tender; no masses  Respiratory: No wheezes, rales or rhonchi  Cardiovascular: Regular rate and rhythm. S1 and S2 Normal; No murmurs, gallops or rubs  Gastrointestinal: Soft non-tender non-distended; Normal bowel sounds  Genitourinary: No costovertebral angle tenderness  Extremities: Normal range of motion, No clubbing, cyanosis or edema  Vascular: Peripheral pulses palpable 2+ bilaterally  Neurological: Alert and oriented x4  Skin: Warm and dry. No acute rash  Lymph Nodes: No acute cervical adenopathy  Musculoskeletal: Normal gait, tone, without deformities  Psychiatric: Cooperative and appropriate    I&O's Detail    CARDIAC MARKERS ( 16 Oct 2022 10:45 )  x     / <0.01 ng/mL / x     / x     / x                            13.4   10.23 )-----------( 275      ( 17 Oct 2022 02:35 )             43.1     17 Oct 2022 02:35    142    |  104    |  14.7   ----------------------------<  92     4.8     |  25.0   |  0.87     Ca    9.6        17 Oct 2022 02:35    TPro  7.1    /  Alb  5.5    /  TBili  0.3    /  DBili  x      /  AST  24     /  ALT  20     /  AlkPhos  71     16 Oct 2022 10:45    PT/INR - ( 16 Oct 2022 10:45 )   PT: 12.8 sec;   INR: 1.10 ratio      PTT - ( 16 Oct 2022 10:45 )  PTT:34.4 sec  CAPILLARY BLOOD GLUCOSE    LIVER FUNCTIONS - ( 16 Oct 2022 10:45 )  Alb: 5.5 g/dL / Pro: 7.1 g/dL / ALK PHOS: 71 U/L / ALT: 20 U/L / AST: 24 U/L / GGT: x           Urinalysis Basic - ( 16 Oct 2022 11:20 )    Color: Yellow / Appearance: Clear / S.015 / pH: x  Gluc: x / Ketone: Negative  / Bili: Negative / Urobili: Negative mg/dL   Blood: x / Protein: 30 mg/dL / Nitrite: Negative   Leuk Esterase: Trace / RBC: 0-2 /HPF / WBC 3-5 /HPF   Sq Epi: x / Non Sq Epi: Occasional / Bacteria: Few        MEDICATIONS  (STANDING):  benztropine 1 milliGRAM(s) Oral two times a day  chlorproMAZINE    Tablet 50 milliGRAM(s) Oral two times a day  lamoTRIgine 200 milliGRAM(s) Oral two times a day  metoprolol tartrate 12.5 milliGRAM(s) Oral two times a day    MEDICATIONS  (PRN):      RADIOLOGY & ADDITIONAL TESTS:

## 2022-10-17 NOTE — PROGRESS NOTE ADULT - ASSESSMENT
28 yo male with CP and behavioral issues on thorazine and lamotrigine who presents from group home after a fall. While in the ED he had a fever and tachycardia. No other SIRs, no clear source of infection. Patient complained to the ED of shortness of breath. Not tachypneic and not on O2. He was noted to have elevated lactate. No hypotension. Patient started on azithromycin and ceftriaxone for suspected UTI/Pneumonia (no infiltrate on CXR and UA bland). Medicine called to admit.    At this time patient able to communicate and states that he only has some epigastric discomfort, didn't LOC on fall, didn't hit his head. Scraped both knees.    #SIRS; rule out sepsis  #elevated lactate  - hold abx until culture results  - check CBC in the AM  - monitor clinically for symptoms  - lactate now improved after bolus over night    #CP with behavioral disturbances  - continue thorazine  - continue lamotrigine    #DVT ppx  - SCD  - ambulation    DISPO: anticipate quick turn around back to group home if ruled out for sepsis.

## 2022-10-18 ENCOUNTER — TRANSCRIPTION ENCOUNTER (OUTPATIENT)
Age: 27
End: 2022-10-18

## 2022-10-18 VITALS
RESPIRATION RATE: 18 BRPM | SYSTOLIC BLOOD PRESSURE: 124 MMHG | DIASTOLIC BLOOD PRESSURE: 85 MMHG | HEART RATE: 97 BPM | OXYGEN SATURATION: 97 % | TEMPERATURE: 98 F

## 2022-10-18 LAB
ANION GAP SERPL CALC-SCNC: 10 MMOL/L — SIGNIFICANT CHANGE UP (ref 5–17)
BUN SERPL-MCNC: 17 MG/DL — SIGNIFICANT CHANGE UP (ref 8–20)
CALCIUM SERPL-MCNC: 9.2 MG/DL — SIGNIFICANT CHANGE UP (ref 8.4–10.5)
CHLORIDE SERPL-SCNC: 105 MMOL/L — SIGNIFICANT CHANGE UP (ref 98–107)
CO2 SERPL-SCNC: 26 MMOL/L — SIGNIFICANT CHANGE UP (ref 22–29)
CREAT SERPL-MCNC: 0.95 MG/DL — SIGNIFICANT CHANGE UP (ref 0.5–1.3)
CULTURE RESULTS: SIGNIFICANT CHANGE UP
EGFR: 113 ML/MIN/1.73M2 — SIGNIFICANT CHANGE UP
GLUCOSE SERPL-MCNC: 82 MG/DL — SIGNIFICANT CHANGE UP (ref 70–99)
HCT VFR BLD CALC: 40.8 % — SIGNIFICANT CHANGE UP (ref 39–50)
HGB BLD-MCNC: 12.7 G/DL — LOW (ref 13–17)
MCHC RBC-ENTMCNC: 27 PG — SIGNIFICANT CHANGE UP (ref 27–34)
MCHC RBC-ENTMCNC: 31.1 GM/DL — LOW (ref 32–36)
MCV RBC AUTO: 86.8 FL — SIGNIFICANT CHANGE UP (ref 80–100)
PLATELET # BLD AUTO: 215 K/UL — SIGNIFICANT CHANGE UP (ref 150–400)
POTASSIUM SERPL-MCNC: 4.3 MMOL/L — SIGNIFICANT CHANGE UP (ref 3.5–5.3)
POTASSIUM SERPL-SCNC: 4.3 MMOL/L — SIGNIFICANT CHANGE UP (ref 3.5–5.3)
RBC # BLD: 4.7 M/UL — SIGNIFICANT CHANGE UP (ref 4.2–5.8)
RBC # FLD: 13.9 % — SIGNIFICANT CHANGE UP (ref 10.3–14.5)
SODIUM SERPL-SCNC: 141 MMOL/L — SIGNIFICANT CHANGE UP (ref 135–145)
SPECIMEN SOURCE: SIGNIFICANT CHANGE UP
WBC # BLD: 4.97 K/UL — SIGNIFICANT CHANGE UP (ref 3.8–10.5)
WBC # FLD AUTO: 4.97 K/UL — SIGNIFICANT CHANGE UP (ref 3.8–10.5)

## 2022-10-18 PROCEDURE — 99239 HOSP IP/OBS DSCHRG MGMT >30: CPT

## 2022-10-18 PROCEDURE — 71045 X-RAY EXAM CHEST 1 VIEW: CPT

## 2022-10-18 PROCEDURE — 94640 AIRWAY INHALATION TREATMENT: CPT

## 2022-10-18 PROCEDURE — 85610 PROTHROMBIN TIME: CPT

## 2022-10-18 PROCEDURE — 84484 ASSAY OF TROPONIN QUANT: CPT

## 2022-10-18 PROCEDURE — 0225U NFCT DS DNA&RNA 21 SARSCOV2: CPT

## 2022-10-18 PROCEDURE — 80048 BASIC METABOLIC PNL TOTAL CA: CPT

## 2022-10-18 PROCEDURE — 87086 URINE CULTURE/COLONY COUNT: CPT

## 2022-10-18 PROCEDURE — 70450 CT HEAD/BRAIN W/O DYE: CPT | Mod: MA

## 2022-10-18 PROCEDURE — 85025 COMPLETE CBC W/AUTO DIFF WBC: CPT

## 2022-10-18 PROCEDURE — 87040 BLOOD CULTURE FOR BACTERIA: CPT

## 2022-10-18 PROCEDURE — 80053 COMPREHEN METABOLIC PANEL: CPT

## 2022-10-18 PROCEDURE — 81001 URINALYSIS AUTO W/SCOPE: CPT

## 2022-10-18 PROCEDURE — 96361 HYDRATE IV INFUSION ADD-ON: CPT

## 2022-10-18 PROCEDURE — 93005 ELECTROCARDIOGRAM TRACING: CPT

## 2022-10-18 PROCEDURE — 96375 TX/PRO/DX INJ NEW DRUG ADDON: CPT

## 2022-10-18 PROCEDURE — 96374 THER/PROPH/DIAG INJ IV PUSH: CPT

## 2022-10-18 PROCEDURE — 99291 CRITICAL CARE FIRST HOUR: CPT

## 2022-10-18 PROCEDURE — 85730 THROMBOPLASTIN TIME PARTIAL: CPT

## 2022-10-18 PROCEDURE — 83605 ASSAY OF LACTIC ACID: CPT

## 2022-10-18 PROCEDURE — 36415 COLL VENOUS BLD VENIPUNCTURE: CPT

## 2022-10-18 PROCEDURE — 85027 COMPLETE CBC AUTOMATED: CPT

## 2022-10-18 RX ADMIN — Medication 1 MILLIGRAM(S): at 15:17

## 2022-10-18 RX ADMIN — Medication 1 MILLIGRAM(S): at 05:24

## 2022-10-18 RX ADMIN — LAMOTRIGINE 200 MILLIGRAM(S): 25 TABLET, ORALLY DISINTEGRATING ORAL at 05:25

## 2022-10-18 RX ADMIN — LAMOTRIGINE 200 MILLIGRAM(S): 25 TABLET, ORALLY DISINTEGRATING ORAL at 15:18

## 2022-10-18 RX ADMIN — Medication 50 MILLIGRAM(S): at 15:17

## 2022-10-18 RX ADMIN — Medication 12.5 MILLIGRAM(S): at 05:25

## 2022-10-18 RX ADMIN — Medication 12.5 MILLIGRAM(S): at 15:18

## 2022-10-18 RX ADMIN — Medication 50 MILLIGRAM(S): at 05:25

## 2022-10-18 NOTE — DISCHARGE NOTE PROVIDER - HOSPITAL COURSE
28 yo male with CP and behavioral issues on thorazine and lamotrigine who presents from group home after a fall. While in the ED he had a fever and tachycardia. No other SIRs, no clear source of infection. Patient complained to the ED of shortness of breath. Not tachypneic and not on O2. He was noted to have elevated lactate. No hypotension. Patient started on azithromycin and ceftriaxone for suspected UTI/Pneumonia (no infiltrate on CXR and UA bland). Medicine called to admit.  cultures negative and lactic acidosis resolved post fluids    stable for dc

## 2022-10-18 NOTE — DISCHARGE NOTE PROVIDER - NSDCMRMEDTOKEN_GEN_ALL_CORE_FT
albuterol CFC free 90 mcg/inh inhalation aerosol: 2 puff(s) inhaled every 6 hours, As needed, Shortness of Breath  Cogentin: 1 milligram(s) orally 2 times a day  EpiPen 2-Ward 0.3 mg injectable kit: 1 application injectable prn, As Needed  lamoTRIgine 200 mg oral tablet: 1 tab(s) orally 2 times a day  Metoprolol Tartrate 25 mg oral tablet: 0.5 tab(s) orally 2 times a day  Thorazine 50 mg oral tablet: orally 2 times a day

## 2022-10-18 NOTE — DISCHARGE NOTE PROVIDER - NSDCCPCAREPLAN_GEN_ALL_CORE_FT
PRINCIPAL DISCHARGE DIAGNOSIS  Diagnosis: Lactic acidosis  Assessment and Plan of Treatment: negative infectious workup.   possibly induced by medications  follow up with primary care doctor

## 2022-10-18 NOTE — DISCHARGE NOTE NURSING/CASE MANAGEMENT/SOCIAL WORK - NSDCPEFALRISK_GEN_ALL_CORE
For information on Fall & Injury Prevention, visit: [email or web address] OR  [email or web address] OR  https://www.cdc.gov/steadi/patient.html

## 2022-10-18 NOTE — DISCHARGE NOTE NURSING/CASE MANAGEMENT/SOCIAL WORK - PATIENT PORTAL LINK FT
You can access the FollowMyHealth Patient Portal offered by [facility] by registering at the following website: [email or web address]. By joining [employer]’s FollowMyHealth portal, you will also be able to view your health information using other applications (apps) compatible with our system.

## 2022-10-18 NOTE — DISCHARGE NOTE NURSING/CASE MANAGEMENT/SOCIAL WORK - NSDCVIVACCINE_GEN_ALL_CORE_FT
Tdap; 16-Jan-2019 18:33; Sheila Abdi (CECE); [employer]; [id number] (Exp. Date: 02-Nov-2020); IntraMuscular; Deltoid Right.; 0.5 milliLiter(s); VIS (VIS Published: 09-May-2013, VIS Presented: 16-Jan-2019);

## 2022-10-21 LAB
CULTURE RESULTS: SIGNIFICANT CHANGE UP
CULTURE RESULTS: SIGNIFICANT CHANGE UP
SPECIMEN SOURCE: SIGNIFICANT CHANGE UP
SPECIMEN SOURCE: SIGNIFICANT CHANGE UP

## 2022-11-02 ENCOUNTER — EMERGENCY (EMERGENCY)
Facility: HOSPITAL | Age: 27
LOS: 1 days | Discharge: HOME | End: 2022-11-02
Attending: EMERGENCY MEDICINE
Payer: MEDICAID

## 2022-11-02 VITALS
OXYGEN SATURATION: 100 % | SYSTOLIC BLOOD PRESSURE: 131 MMHG | DIASTOLIC BLOOD PRESSURE: 58 MMHG | HEART RATE: 99 BPM | TEMPERATURE: 99 F | HEIGHT: 71 IN | RESPIRATION RATE: 18 BRPM

## 2022-11-02 PROCEDURE — 99282 EMERGENCY DEPT VISIT SF MDM: CPT

## 2022-11-02 PROCEDURE — 99283 EMERGENCY DEPT VISIT LOW MDM: CPT

## 2022-11-02 NOTE — ED PROVIDER NOTE - PROGRESS NOTE DETAILS
Odalis Cramer for ED attending, Dr. Kim: spoke to anders from [facility], ok to d/c pt back via medicaid cab

## 2022-11-02 NOTE — ED PROVIDER NOTE - OBJECTIVE STATEMENT
26 y/o male with PMHx of Cerebral Palsy, Autism presents to the ED from his group home after he got upset and hit his head against the wall. Pt with no complaints now, calm and cooperative in ED.

## 2022-11-02 NOTE — ED ADULT TRIAGE NOTE - CHIEF COMPLAINT QUOTE
pt BIBEMS with [facility] after pt had "psychotic episode" and began banging his head on wall. -LOC, no obvious signs of trauma. pt calm and cooperative at this time. pt from [facility] without aide present. pt to be placed on 1:1 for safety.

## 2022-11-02 NOTE — ED ADULT NURSE NOTE - OBJECTIVE STATEMENT
pt BIBEMS with [facility] after pt had "psychotic episode" and began banging his head on wall. -LOC, no obvious signs of trauma. pt calm and cooperative at this time. pt from [facility] without aide present. pt to be placed on 1:1 for safety.

## 2022-11-02 NOTE — ED PROVIDER NOTE - PATIENT PORTAL LINK FT
You can access the FollowMyHealth Patient Portal offered by [facility] by registering at the following website: [email or web address]. By joining [employer]’s FollowMyHealth portal, you will also be able to view your health information using other applications (apps) compatible with our system.

## 2022-11-02 NOTE — ED ADULT NURSE NOTE - CHIEF COMPLAINT QUOTE
pt BIBEMS with [facility] after pt had "psychotic episode" and began banging his head on wall. -LOC, no obvious signs of trauma. pt calm and cooperative at this time. pt from [facility] without aide present. pt to be placed on 1:1 for safety.

## 2022-11-02 NOTE — ED ADULT NURSE NOTE - NSIMPLEMENTINTERV_GEN_ALL_ED
Implemented All Universal Safety Interventions:  [place] to call system. Call bell, personal items and telephone within reach. Instruct patient to call for assistance. Room bathroom lighting operational. Non-slip footwear when patient is off stretcher. Physically safe environment: no spills, clutter or unnecessary equipment. Stretcher in lowest position, wheels locked, appropriate side rails in place.

## 2022-11-02 NOTE — ED ADULT NURSE NOTE - CAS DISC DELAYS
----- Message from Irasema lisarobertbucky sent at 10/25/2018  4:13 PM CDT -----  Contact: daughterPriti Pimentel - [phone]  Pt's daughter calling to verify if information was sent to [employer] regarding pt's disability.  She states the deadline is tomorrow. Please contact back at earliest convenience.   
Left message that Paperwork has been faxed and ready for .  
Other

## 2022-11-04 ENCOUNTER — EMERGENCY (EMERGENCY)
Facility: HOSPITAL | Age: 27
LOS: 1 days | Discharge: HOME | End: 2022-11-04
Attending: EMERGENCY MEDICINE
Payer: MEDICAID

## 2022-11-04 VITALS
HEART RATE: 91 BPM | SYSTOLIC BLOOD PRESSURE: 123 MMHG | TEMPERATURE: 98 F | DIASTOLIC BLOOD PRESSURE: 69 MMHG | RESPIRATION RATE: 18 BRPM | OXYGEN SATURATION: 98 %

## 2022-11-04 VITALS
OXYGEN SATURATION: 98 % | HEART RATE: 62 BPM | SYSTOLIC BLOOD PRESSURE: 167 MMHG | HEIGHT: 71 IN | DIASTOLIC BLOOD PRESSURE: 92 MMHG | TEMPERATURE: 99 F | RESPIRATION RATE: 18 BRPM

## 2022-11-04 LAB
ALBUMIN SERPL ELPH-MCNC: 5.1 G/DL — SIGNIFICANT CHANGE UP (ref 3.3–5.2)
ALP SERPL-CCNC: 64 U/L — SIGNIFICANT CHANGE UP (ref 40–120)
ALT FLD-CCNC: 16 U/L — SIGNIFICANT CHANGE UP
ANION GAP SERPL CALC-SCNC: 12 MMOL/L — SIGNIFICANT CHANGE UP (ref 5–17)
AST SERPL-CCNC: 23 U/L — SIGNIFICANT CHANGE UP
BILIRUB SERPL-MCNC: 0.6 MG/DL — SIGNIFICANT CHANGE UP (ref 0.4–2)
BUN SERPL-MCNC: 14.3 MG/DL — SIGNIFICANT CHANGE UP (ref 8–20)
CALCIUM SERPL-MCNC: 9.4 MG/DL — SIGNIFICANT CHANGE UP (ref 8.4–10.5)
CHLORIDE SERPL-SCNC: 103 MMOL/L — SIGNIFICANT CHANGE UP (ref 96–108)
CO2 SERPL-SCNC: 25 MMOL/L — SIGNIFICANT CHANGE UP (ref 22–29)
CREAT SERPL-MCNC: 0.84 MG/DL — SIGNIFICANT CHANGE UP (ref 0.5–1.3)
EGFR: 123 ML/MIN/1.73M2 — SIGNIFICANT CHANGE UP
GLUCOSE SERPL-MCNC: 85 MG/DL — SIGNIFICANT CHANGE UP (ref 70–99)
HCT VFR BLD CALC: 41.2 % — SIGNIFICANT CHANGE UP (ref 39–50)
HGB BLD-MCNC: 13.2 G/DL — SIGNIFICANT CHANGE UP (ref 13–17)
MCHC RBC-ENTMCNC: 27.2 PG — SIGNIFICANT CHANGE UP (ref 27–34)
MCHC RBC-ENTMCNC: 32 GM/DL — SIGNIFICANT CHANGE UP (ref 32–36)
MCV RBC AUTO: 84.9 FL — SIGNIFICANT CHANGE UP (ref 80–100)
PLATELET # BLD AUTO: 235 K/UL — SIGNIFICANT CHANGE UP (ref 150–400)
POTASSIUM SERPL-MCNC: 4.5 MMOL/L — SIGNIFICANT CHANGE UP (ref 3.5–5.3)
POTASSIUM SERPL-SCNC: 4.5 MMOL/L — SIGNIFICANT CHANGE UP (ref 3.5–5.3)
PROT SERPL-MCNC: 6.4 G/DL — LOW (ref 6.6–8.7)
RBC # BLD: 4.85 M/UL — SIGNIFICANT CHANGE UP (ref 4.2–5.8)
RBC # FLD: 13.3 % — SIGNIFICANT CHANGE UP (ref 10.3–14.5)
SODIUM SERPL-SCNC: 140 MMOL/L — SIGNIFICANT CHANGE UP (ref 135–145)
WBC # BLD: 5.71 K/UL — SIGNIFICANT CHANGE UP (ref 3.8–10.5)
WBC # FLD AUTO: 5.71 K/UL — SIGNIFICANT CHANGE UP (ref 3.8–10.5)

## 2022-11-04 PROCEDURE — 80053 COMPREHEN METABOLIC PANEL: CPT

## 2022-11-04 PROCEDURE — 85027 COMPLETE CBC AUTOMATED: CPT

## 2022-11-04 PROCEDURE — 99285 EMERGENCY DEPT VISIT HI MDM: CPT

## 2022-11-04 PROCEDURE — 36415 COLL VENOUS BLD VENIPUNCTURE: CPT

## 2022-11-04 PROCEDURE — 99283 EMERGENCY DEPT VISIT LOW MDM: CPT

## 2022-11-04 RX ORDER — RISPERIDONE 4 MG/1
3 TABLET ORAL ONCE
Refills: 0 | Status: COMPLETED | OUTPATIENT
Start: 2022-11-04 | End: 2022-11-04

## 2022-11-04 RX ORDER — BENZTROPINE MESYLATE 1 MG
1 TABLET ORAL ONCE
Refills: 0 | Status: COMPLETED | OUTPATIENT
Start: 2022-11-04 | End: 2022-11-04

## 2022-11-04 RX ORDER — CHLORPROMAZINE HCL 10 MG
50 TABLET ORAL ONCE
Refills: 0 | Status: COMPLETED | OUTPATIENT
Start: 2022-11-04 | End: 2022-11-04

## 2022-11-04 RX ORDER — ONDANSETRON 8 MG/1
4 TABLET, FILM COATED ORAL ONCE
Refills: 0 | Status: COMPLETED | OUTPATIENT
Start: 2022-11-04 | End: 2022-11-04

## 2022-11-04 RX ADMIN — RISPERIDONE 3 MILLIGRAM(S): 4 TABLET ORAL at 21:35

## 2022-11-04 RX ADMIN — ONDANSETRON 4 MILLIGRAM(S): 8 TABLET, FILM COATED ORAL at 21:36

## 2022-11-04 RX ADMIN — Medication 1 MILLIGRAM(S): at 21:36

## 2022-11-04 RX ADMIN — Medication 50 MILLIGRAM(S): at 21:36

## 2022-11-04 NOTE — ED PROVIDER NOTE - NSFOLLOWUPINSTRUCTIONS_ED_ALL_ED_FT
pt should not be able to handle cleaning products  clear liquids  x 24 hours  return to ed with worse abdominal pain or vomiting   follow up with psychiatry if symptoms continue for reevaluaiton of medications

## 2022-11-04 NOTE — ED PROVIDER NOTE - PATIENT PORTAL LINK FT
You can access the FollowMyHealth Patient Portal offered by [facility] by registering at the following website: [email or web address]. By joining [employer]’s FollowMyHealth portal, you will also be able to view your health information using other applications (apps) compatible with our system.

## 2022-11-04 NOTE — ED ADULT NURSE NOTE - OBJECTIVE STATEMENT
pt reports of taking scentiva and mr bleach but spit it right out and having sore throat. pt able to speak in full sentences. able to swallow own secretions. pt breathing even and unlabored to room air. group home staff at bedside with pt. pt calm and cooperative.

## 2022-11-04 NOTE — ED ADULT TRIAGE NOTE - CHIEF COMPLAINT QUOTE
pt BIBEMS from group home after drinking a disinfectant. aide at bedside. pt complaining of abdominal pain, N/V.

## 2022-11-04 NOTE — ED PROVIDER NOTE - OBJECTIVE STATEMENT
26 yo male pmh cp, adhd from group home comes to ed after ingestion of insensitva chlorox- bleach free liquid/ pt vomitted contents afterwards; pt complains of nausea ;

## 2022-11-04 NOTE — ED PROVIDER NOTE - CLINICAL SUMMARY MEDICAL DECISION MAKING FREE TEXT BOX
ingestion of insentiva  non- bleach scented ; pt vomited after ingestion; ; no further symptoms; observe

## 2022-11-04 NOTE — ED PROVIDER NOTE - PHYSICAL EXAMINATION
Alert, lucid, and in no apparent distress. Pt is normocephalic, atraumatic.  Pupils are equal, round,  lips pink, moist mucous membranes, tongue midline. Neck supple.   Lungs clear to auscultation. Heart regular rate and rhythm, normal S1, S2,   Abdomen is soft, nontender, no pulsatile mass, no masses, no distension, no rebound. No CVA Tenderness,  Non-focal sensory, 5 out of 5 motor strength, no dysmetria, fluent, goal directed speech. CN2 to 12 intact. Skin without rash,   Normal mentation, does not appear agitated

## 2022-11-04 NOTE — ED ADULT NURSE REASSESSMENT NOTE - NS ED NURSE REASSESS COMMENT FT1
pt denies any complaints at this time. still able to speak in full sentences. no coughing / spitting of blood. Dr. Haile at bedside.

## 2022-11-29 ENCOUNTER — EMERGENCY (EMERGENCY)
Facility: HOSPITAL | Age: 27
LOS: 1 days | Discharge: HOME | End: 2022-11-29
Attending: STUDENT IN AN ORGANIZED HEALTH CARE EDUCATION/TRAINING PROGRAM
Payer: MEDICAID

## 2022-11-29 VITALS
HEART RATE: 61 BPM | WEIGHT: 164.91 LBS | SYSTOLIC BLOOD PRESSURE: 138 MMHG | TEMPERATURE: 98 F | OXYGEN SATURATION: 99 % | DIASTOLIC BLOOD PRESSURE: 70 MMHG | HEIGHT: 71 IN | RESPIRATION RATE: 18 BRPM

## 2022-11-29 VITALS
SYSTOLIC BLOOD PRESSURE: 121 MMHG | OXYGEN SATURATION: 99 % | RESPIRATION RATE: 18 BRPM | TEMPERATURE: 98 F | HEART RATE: 65 BPM | DIASTOLIC BLOOD PRESSURE: 74 MMHG

## 2022-11-29 PROCEDURE — 87637 SARSCOV2&INF A&B&RSV AMP PRB: CPT

## 2022-11-29 PROCEDURE — 71045 X-RAY EXAM CHEST 1 VIEW: CPT

## 2022-11-29 PROCEDURE — 99284 EMERGENCY DEPT VISIT MOD MDM: CPT

## 2022-11-29 PROCEDURE — 99283 EMERGENCY DEPT VISIT LOW MDM: CPT

## 2022-11-29 PROCEDURE — 71045 X-RAY EXAM CHEST 1 VIEW: CPT | Mod: 26

## 2022-11-29 RX ORDER — ACETAMINOPHEN 500 MG
650 TABLET ORAL ONCE
Refills: 0 | Status: COMPLETED | OUTPATIENT
Start: 2022-11-29 | End: 2022-11-29

## 2022-11-29 RX ADMIN — Medication 650 MILLIGRAM(S): at 22:08

## 2022-11-29 RX ADMIN — Medication 650 MILLIGRAM(S): at 20:48

## 2022-11-29 NOTE — ED PROVIDER NOTE - PHYSICAL EXAMINATION
Vital Signs per nursing documentation  Gen: well appearing, no acute distress  HEENT: NCAT, MMM, throat clear  Cardiac: regular rate rhythm, normal S1S2  Chest: clear to auscultation bilateral, no wheezes or crackles  Abdomen: soft, non tender non distended  Extremity: no gross deformity, good perfusion  Skin: no rash  Neuro: nonfocal neuro exam

## 2022-11-29 NOTE — ED PROVIDER NOTE - CLINICAL SUMMARY MEDICAL DECISION MAKING FREE TEXT BOX
cough and sore throat for 1 day. not hypoxic here and lungs clear. will get xr eval for pna. viral swab. d/w aid at bedside. likely supportive care and outpt f/u

## 2022-11-29 NOTE — ED ADULT NURSE NOTE - ALCOHOL PRE SCREEN (AUDIT - C)
[address] [facility] EMERGENCY DEPT [address] [address] 
[phone] Work/School Note Date: 5/10/2018 To Whom It May concern: 
 
Norma Bronson was seen and treated today in the emergency room by the following provider(s): 
Attending Provider: Trinidad Tan MD.   
 
Norma Bronson Special Instructions:  School excuse today and tomorrow. He may return on Friday  If no fever, and symptoms have resolved. Otherwise, excuse absence Sincerely, 
 
 
 
 
Trinidad Tan MD 
 
 
 

Statement Selected

## 2022-11-29 NOTE — ED PROVIDER NOTE - PATIENT PORTAL LINK FT
You can access the FollowMyHealth Patient Portal offered by [facility] by registering at the following website: [email or web address]. By joining [employer]’s FollowMyHealth portal, you will also be able to view your health information using other applications (apps) compatible with our system.

## 2022-11-29 NOTE — ED PROVIDER NOTE - OBJECTIVE STATEMENT
26 yo male pmh cp, adhd, autism from group home here with 1 day of cough and sore throat. Is vaccinated for covid but unsure about flu. aid at bedside reports otherwise baseline. Denies fever, cp, sob, abd pain, leg swelling.

## 2022-11-30 LAB
FLUAV AG NPH QL: SIGNIFICANT CHANGE UP
FLUBV AG NPH QL: SIGNIFICANT CHANGE UP
RSV RNA NPH QL NAA+NON-PROBE: SIGNIFICANT CHANGE UP
SARS-COV-2 RNA SPEC QL NAA+PROBE: SIGNIFICANT CHANGE UP

## 2022-12-07 NOTE — ED PROVIDER NOTE - CLINICAL SUMMARY MEDICAL DECISION MAKING FREE TEXT BOX
24 YO RHD male with right neck dull pain x 1 year. Prior imaging study showed herniated disc, pain improved with PT. Recently, his neck pain got worse with difficulty turning neck to left, repeat  imaging reveal having lytic mass involving C1 lateral & posterior arch with out narrowing.   s/p Balloon test occlusion and embolization of Right vertebral artery for anticipated C1 mass resection  on 11/16. Stage 1 mass resection on 11/17/2022 and stage 2 resection on 11/18/2022. Hospital course significant for respiratory failure s/p intubation, extubation, re-intubation then tracheostomy on 11/26 . Ileus 2/2 narcotics, failed s/s now s/p PEG tube on 12/2, Kidney stone which he passed on 12/4, pancreatitis, urinary retention. Patient now with gait Instability, ADL impairments and Functional impairments- PT/OT/DVT PPX    #Cervical Mass  - lytic mass involving C1 lateral & posterior arch with out narrowing.     - s/p Balloon test occlusion and embolization of Right vertebral artery for anticipated C1 mass resection  on 11/16.  - Cervical collar when OOB    #Respiratory Failure  - s/p intubation, extubation x 2  - Tracheostomy 11/26  - c/w supplemental oxygen    #Pain management  - Tylenol PRN, Oxycodone PRN, ibuprofen    #DVT ppx  - Lovenox    #GI ppx  - Protonix    #Urinary Retention  #Bladder management  #Kidney stone  - right Hydronephrosis with few small distal ureteral stones, 1-2 mm  - Monitor UO  - passed stone on 12/24  - OP urology f/u       #Dysphagia    - s/p PEG placement 12/2  - TUBE FEEDS- titrate up gradually  - iv lfuids for now for hydration    #Anxiety  - Alprazolam    #Sleep:   - Melatonin     will follow  d/w dr. mitchell
Pt nontoxic appearing, stable vitals, ambulatory with stable saturation without supplemental oxygen. PT does not meet criteria listed in most updated guidelines as per [facility] protocol/algorithm for admission at this time. pt advised about self-quarantine instructions until negative test results and/or symptom resolution. pt advised on hand hygiene, monitoring of symptoms, antipyretic use as well as and fu with primary care provider. Instructions given in pre-printed copy.

## 2023-01-01 NOTE — ED PROVIDER NOTE - MUSCULOSKELETAL NEGATIVE STATEMENT, MLM
dosing from OSH   no back pain, no gout, no musculoskeletal pain, no neck pain, and no weakness.

## 2023-01-02 ENCOUNTER — EMERGENCY (EMERGENCY)
Facility: HOSPITAL | Age: 28
LOS: 1 days | Discharge: HOME | End: 2023-01-02
Attending: EMERGENCY MEDICINE
Payer: MEDICAID

## 2023-01-02 VITALS
HEART RATE: 109 BPM | OXYGEN SATURATION: 98 % | HEIGHT: 71 IN | DIASTOLIC BLOOD PRESSURE: 81 MMHG | TEMPERATURE: 99 F | SYSTOLIC BLOOD PRESSURE: 137 MMHG | RESPIRATION RATE: 20 BRPM

## 2023-01-02 LAB
ALBUMIN SERPL ELPH-MCNC: 4.7 G/DL — SIGNIFICANT CHANGE UP (ref 3.3–5.2)
ALP SERPL-CCNC: 59 U/L — SIGNIFICANT CHANGE UP (ref 40–120)
ALT FLD-CCNC: 15 U/L — SIGNIFICANT CHANGE UP
AST SERPL-CCNC: 19 U/L — SIGNIFICANT CHANGE UP
BILIRUB SERPL-MCNC: 0.4 MG/DL — SIGNIFICANT CHANGE UP (ref 0.4–2)
BUN SERPL-MCNC: 13.1 MG/DL — SIGNIFICANT CHANGE UP (ref 8–20)
CALCIUM SERPL-MCNC: 9.5 MG/DL — SIGNIFICANT CHANGE UP (ref 8.4–10.5)
CHLORIDE SERPL-SCNC: 105 MMOL/L — SIGNIFICANT CHANGE UP (ref 96–108)
CO2 SERPL-SCNC: 27 MMOL/L — SIGNIFICANT CHANGE UP (ref 22–29)
CREAT SERPL-MCNC: 0.91 MG/DL — SIGNIFICANT CHANGE UP (ref 0.5–1.3)
EGFR: 119 ML/MIN/1.73M2 — SIGNIFICANT CHANGE UP
GLUCOSE SERPL-MCNC: 103 MG/DL — HIGH (ref 70–99)
HCT VFR BLD CALC: 40.6 % — SIGNIFICANT CHANGE UP (ref 39–50)
HGB BLD-MCNC: 13.1 G/DL — SIGNIFICANT CHANGE UP (ref 13–17)
LIDOCAIN IGE QN: 11 U/L — LOW (ref 22–51)
MCHC RBC-ENTMCNC: 27.2 PG — SIGNIFICANT CHANGE UP (ref 27–34)
MCHC RBC-ENTMCNC: 32.3 GM/DL — SIGNIFICANT CHANGE UP (ref 32–36)
MCV RBC AUTO: 84.2 FL — SIGNIFICANT CHANGE UP (ref 80–100)
PLATELET # BLD AUTO: 196 K/UL — SIGNIFICANT CHANGE UP (ref 150–400)
POTASSIUM SERPL-MCNC: 3.9 MMOL/L — SIGNIFICANT CHANGE UP (ref 3.5–5.3)
POTASSIUM SERPL-SCNC: 3.9 MMOL/L — SIGNIFICANT CHANGE UP (ref 3.5–5.3)
PROT SERPL-MCNC: 6.4 G/DL — LOW (ref 6.6–8.7)
RBC # BLD: 4.82 M/UL — SIGNIFICANT CHANGE UP (ref 4.2–5.8)
RBC # FLD: 13.1 % — SIGNIFICANT CHANGE UP (ref 10.3–14.5)
SODIUM SERPL-SCNC: 145 MMOL/L — SIGNIFICANT CHANGE UP (ref 135–145)
WBC # BLD: 4.82 K/UL — SIGNIFICANT CHANGE UP (ref 3.8–10.5)
WBC # FLD AUTO: 4.82 K/UL — SIGNIFICANT CHANGE UP (ref 3.8–10.5)

## 2023-01-02 PROCEDURE — 87637 SARSCOV2&INF A&B&RSV AMP PRB: CPT

## 2023-01-02 PROCEDURE — 99284 EMERGENCY DEPT VISIT MOD MDM: CPT

## 2023-01-02 PROCEDURE — 99284 EMERGENCY DEPT VISIT MOD MDM: CPT | Mod: 25

## 2023-01-02 PROCEDURE — 96375 TX/PRO/DX INJ NEW DRUG ADDON: CPT

## 2023-01-02 PROCEDURE — 96361 HYDRATE IV INFUSION ADD-ON: CPT

## 2023-01-02 PROCEDURE — 36415 COLL VENOUS BLD VENIPUNCTURE: CPT

## 2023-01-02 PROCEDURE — 83690 ASSAY OF LIPASE: CPT

## 2023-01-02 PROCEDURE — 96374 THER/PROPH/DIAG INJ IV PUSH: CPT

## 2023-01-02 PROCEDURE — 80053 COMPREHEN METABOLIC PANEL: CPT

## 2023-01-02 PROCEDURE — 85027 COMPLETE CBC AUTOMATED: CPT

## 2023-01-02 RX ORDER — KETOROLAC TROMETHAMINE 30 MG/ML
15 SYRINGE (ML) INJECTION ONCE
Refills: 0 | Status: DISCONTINUED | OUTPATIENT
Start: 2023-01-02 | End: 2023-01-02

## 2023-01-02 RX ORDER — ONDANSETRON 8 MG/1
4 TABLET, FILM COATED ORAL ONCE
Refills: 0 | Status: COMPLETED | OUTPATIENT
Start: 2023-01-02 | End: 2023-01-02

## 2023-01-02 RX ORDER — FAMOTIDINE 10 MG/ML
20 INJECTION INTRAVENOUS ONCE
Refills: 0 | Status: COMPLETED | OUTPATIENT
Start: 2023-01-02 | End: 2023-01-02

## 2023-01-02 RX ORDER — SODIUM CHLORIDE 9 MG/ML
1000 INJECTION INTRAMUSCULAR; INTRAVENOUS; SUBCUTANEOUS ONCE
Refills: 0 | Status: COMPLETED | OUTPATIENT
Start: 2023-01-02 | End: 2023-01-02

## 2023-01-02 RX ADMIN — Medication 15 MILLIGRAM(S): at 22:42

## 2023-01-02 RX ADMIN — ONDANSETRON 4 MILLIGRAM(S): 8 TABLET, FILM COATED ORAL at 22:42

## 2023-01-02 RX ADMIN — FAMOTIDINE 20 MILLIGRAM(S): 10 INJECTION INTRAVENOUS at 22:43

## 2023-01-02 RX ADMIN — SODIUM CHLORIDE 1000 MILLILITER(S): 9 INJECTION INTRAMUSCULAR; INTRAVENOUS; SUBCUTANEOUS at 22:43

## 2023-01-02 NOTE — ED PROVIDER NOTE - PROGRESS NOTE DETAILS
Daniel MANZANO: Patient feeling better, repeat abdominal exam soft nontender nondistended, tolerating p.o. in the ED.  Ready for discharge.  Return precautions given.

## 2023-01-02 NOTE — ED PROVIDER NOTE - PATIENT PORTAL LINK FT
You can access the FollowMyHealth Patient Portal offered by [facility] by registering at the following website: [email or web address]. By joining [employer]’s FollowMyHealth portal, you will also be able to view your health information using other applications (apps) compatible with our system.

## 2023-01-02 NOTE — ED PROVIDER NOTE - PHYSICAL EXAMINATION
Gen: Well appearing in NAD  Head: NC/AT  Neck: trachea midline  Resp:  No distress  CV: RRR  GI: soft,  mild epigastric tenderness palpation, no McBurney's tenderness, no right upper quadrant ttp/Sung sign.  Ext: no deformitie  Neuro:  A&O appears non focal  Skin:  Warm and dry as visualized  Psych:  Normal affect and mood

## 2023-01-02 NOTE — ED PROVIDER NOTE - OBJECTIVE STATEMENT
27-year-old male history of CP, developmental disability p/w epigastric pain, nonbloody nonbilious emesis and diarrhea x1 day.  Endorses headache and subjective fever.  Lives in a group home, but no other residents are sick.  Denies dysuria, hematuria, nasal congestion, sore throat.

## 2023-01-02 NOTE — ED PROVIDER NOTE - NSFOLLOWUPINSTRUCTIONS_ED_ALL_ED_FT
PT Name: Zachary Anguiano  MR #: [id number]    DOCUMENTATION CLARIFICATION     CDS/: Lisbeth Pendleton RN, CDS               Contact information: [email or web address])                    Vas neuro Note 8/23 ( Dr basurto/LION Meeks)                  Vas neuro Note 8/26 (Dr Basurto/LION Haile)       --[id number]       Form No. [id number]   - Follow up with your doctor within 2-3 days.   - Bring results with you to the appointment.   - Take Zofran 4mg every 6 hours as needed for nausea.   - Stay well hydrated.   - Advance diet slowly.   - Return to the ED for any new or worsening symptoms.     Nausea / Vomiting    Nausea is the feeling that you have to vomit. As nausea gets worse, it can lead to vomiting. Vomiting puts you at an increased risk for dehydration. Older adults and people with other diseases or a weak immune system are at higher risk for dehydration. Drink clear fluids in small but frequent amounts as tolerated. Eat bland, easy-to-digest foods in small amounts as tolerated.    SEEK IMMEDIATE MEDICAL CARE IF YOU HAVE ANY OF THE FOLLOWING SYMPTOMS: fever, inability to keep sufficient fluids down, black or bloody vomitus, black or bloody stools, lightheadedness/dizziness, chest pain, severe headache, rash, shortness of breath, cold or clammy skin, confusion, pain with urination, or any signs of dehydration.

## 2023-01-02 NOTE — ED PROVIDER NOTE - CLINICAL SUMMARY MEDICAL DECISION MAKING FREE TEXT BOX
Patient presenting with subjective fever, nausea vomiting and diarrhea x1 day.  Likely viral in nature.  plan for labs including lipase, IV fluids, IV symptomatic treatment, viral swab and reassess.

## 2023-01-02 NOTE — ED ADULT NURSE NOTE - OBJECTIVE STATEMENT
patient presents with staff from [facility], pt is a 26 y/o/m w/pmhx developmental disability presents c/o epigastric pain, vomiting, headache and diarrhea x1 day

## 2023-01-03 RX ORDER — ONDANSETRON 8 MG/1
1 TABLET, FILM COATED ORAL
Qty: 12 | Refills: 0
Start: 2023-01-03 | End: 2023-01-05

## 2023-01-13 NOTE — ED ADULT NURSE NOTE - NS ED NURSE DISCH DISPOSITION
Return OB     Visit Vitals  /84 (BP Location: RUE - Right upper extremity, Patient Position: Sitting, Cuff Size: Regular)   Pulse 82   Temp 98 °F (36.7 °C) (Oral)   Ht 5' 8\" (1.727 m)   Wt 69.3 kg (152 lb 12.5 oz)   LMP 03/30/2022   SpO2 98%   BMI 23.23 kg/m²     Edema  Edema: Negative  Dil / Eff / Sta / Pres  Dilation (cm) : 7  Effacement (%): 80  Station: -1  Presentation: Vertex  Alb / Glu  Albumin: Negative  Glucose: Negative    Patient 39w6d 1/14/2023  Patient has contractions q 5 min.No bleeding, no fluid leakage.   VE- 7cm, 80%, vtx-1.  NST reactive, ctx's q 5min. In active labor.  Sent to L&D on wheel chair.    No follow-ups on file.    Myla Woodson MD    Discharged

## 2023-01-17 NOTE — ED ADULT NURSE NOTE - NSIMPLEMENTINTERV_GEN_ALL_ED
Airway  Performed by: Racquel Leslie MD  Authorized by: Racquel Leslie MD     Final Airway Type:  Endotracheal airway  Final Endotracheal Airway*:  ETT  ETT Size (mm)*:  8.0  Cuff*:  Regular  Technique Used for Successful ETT Placement:  Direct laryngoscopy  Devices/Methods Used in Placement*:  Mask  Intubation Procedure*:  Preoxygenation, ETCO2, Atraumatic, Dentition Unchanged and Pharynx Clear  Insertion Site:  Oral  Blade Type*:  MAC  Blade Size*:  4  Placement Verified by: auscultation and capnometry    Glottic View*:  1 - full view of glottis  Attempts*:  1   Patient Identified, Procedure confirmed, Emergency equipment available and Safety protocols followed  Location:  OR  Urgency:  Elective  Difficult Airway: No    Indications for Airway Management:  Anesthesia  Spontaneous Ventilation: absent    Sedation Level:  Anesthetized  Mask Difficulty Assessment:  1 - vent by mask  Performed By:  Anesthesiologist  Anesthesiologist:  Racquel Leslie MD  Start Time: 1/17/2023 9:32 AM     Implemented All Universal Safety Interventions:  [place] to call system. Call bell, personal items and telephone within reach. Instruct patient to call for assistance. Room bathroom lighting operational. Non-slip footwear when patient is off stretcher. Physically safe environment: no spills, clutter or unnecessary equipment. Stretcher in lowest position, wheels locked, appropriate side rails in place.

## 2023-01-31 NOTE — ED ADULT NURSE REASSESSMENT NOTE - STATUS
awaiting discharge, no change
You can access the FollowMyHealth Patient Portal offered by [facility] by registering at the following website: [email or web address]. By joining [employer]’s FollowMyHealth portal, you will also be able to view your health information using other applications (apps) compatible with our system.

## 2023-02-16 NOTE — ED PROVIDER NOTE - NS_EDPROVIDERDISPOUSERTYPE_ED_A_ED
Attending Attestation (For Attendings USE Only)... Same Histology In Subsequent Stages Text: The pattern and morphology of the tumor is as described in the first stage.

## 2023-03-28 ENCOUNTER — EMERGENCY (EMERGENCY)
Facility: HOSPITAL | Age: 28
LOS: 1 days | Discharge: HOME | End: 2023-03-28
Attending: EMERGENCY MEDICINE
Payer: MEDICAID

## 2023-03-28 VITALS
OXYGEN SATURATION: 97 % | RESPIRATION RATE: 18 BRPM | HEART RATE: 108 BPM | SYSTOLIC BLOOD PRESSURE: 125 MMHG | DIASTOLIC BLOOD PRESSURE: 97 MMHG | TEMPERATURE: 98 F

## 2023-03-28 PROCEDURE — 99283 EMERGENCY DEPT VISIT LOW MDM: CPT

## 2023-03-28 PROCEDURE — 99284 EMERGENCY DEPT VISIT MOD MDM: CPT

## 2023-03-28 PROCEDURE — 99282 EMERGENCY DEPT VISIT SF MDM: CPT

## 2023-03-28 RX ORDER — IBUPROFEN 200 MG
600 TABLET ORAL ONCE
Refills: 0 | Status: COMPLETED | OUTPATIENT
Start: 2023-03-28 | End: 2023-03-28

## 2023-03-28 RX ADMIN — Medication 600 MILLIGRAM(S): at 13:30

## 2023-03-28 NOTE — ED PROVIDER NOTE - ATTENDING CONTRIBUTION TO CARE
The patient discussed with resident    Headache    I, Dusty Schultz, performed the initial face to face bedside interview with this patient regarding history of present illness, review of symptoms and relevant past medical, social and family history.  I completed an independent physical examination.  I was the initial provider who evaluated this patient. I have signed out the follow up of any pending tests (i.e. labs, radiological studies) to the resident  I have communicated the patient’s plan of care and disposition with the resident

## 2023-03-28 NOTE — ED PROVIDER NOTE - PROGRESS NOTE DETAILS
Darwin: Headache resolved. Ambulating independently in ED. Tolerating PO. Stable for dc. Patient and group home staff member understand return precautions and f/u.

## 2023-03-28 NOTE — ED PROVIDER NOTE - CLINICAL SUMMARY MEDICAL DECISION MAKING FREE TEXT BOX
Patient agitated and yelling at group home. Now with headache. Neurologically intact. Vitally stable. given pain control.

## 2023-03-28 NOTE — ED PROVIDER NOTE - NSFOLLOWUPINSTRUCTIONS_ED_ALL_ED_FT
1) Please follow-up with your primary care doctor in the next 5-7 days.  Please call tomorrow for an appointment.  If you cannot follow-up with your primary care doctor please return to the ED for any urgent issues.  2) You were given a copy of the tests performed today.  Please bring the results with you and review them with your primary care doctor.  3) If you have any worsening of symptoms or any other concerns please return to the ED immediately.  4) Please continue taking your home medications as directed. Juan Rae is cleared for Day program.    1) Please follow-up with your primary care doctor in the next 5-7 days.  Please call tomorrow for an appointment.  If you cannot follow-up with your primary care doctor please return to the ED for any urgent issues.  2) You were given a copy of the tests performed today.  Please bring the results with you and review them with your primary care doctor.  3) If you have any worsening of symptoms or any other concerns please return to the ED immediately.  4) Please continue taking your home medications as directed.

## 2023-03-28 NOTE — ED PROVIDER NOTE - PATIENT PORTAL LINK FT
You can access the FollowMyHealth Patient Portal offered by [facility] by registering at the following website: [email or web address]. By joining [employer]’s FollowMyHealth portal, you will also be able to view your health information using other applications (apps) compatible with our system.

## 2023-05-03 ENCOUNTER — EMERGENCY (EMERGENCY)
Facility: HOSPITAL | Age: 28
LOS: 1 days | Discharge: HOME | End: 2023-05-03
Attending: EMERGENCY MEDICINE
Payer: MEDICAID

## 2023-05-03 VITALS
HEART RATE: 85 BPM | OXYGEN SATURATION: 100 % | DIASTOLIC BLOOD PRESSURE: 73 MMHG | SYSTOLIC BLOOD PRESSURE: 128 MMHG | RESPIRATION RATE: 18 BRPM | TEMPERATURE: 98 F

## 2023-05-03 VITALS
TEMPERATURE: 98 F | WEIGHT: 169.98 LBS | RESPIRATION RATE: 18 BRPM | DIASTOLIC BLOOD PRESSURE: 70 MMHG | OXYGEN SATURATION: 100 % | SYSTOLIC BLOOD PRESSURE: 118 MMHG | HEART RATE: 103 BPM

## 2023-05-03 DIAGNOSIS — F79 UNSPECIFIED INTELLECTUAL DISABILITIES: ICD-10-CM

## 2023-05-03 DIAGNOSIS — F84.0 AUTISTIC DISORDER: ICD-10-CM

## 2023-05-03 DIAGNOSIS — F63.9 IMPULSE DISORDER, UNSPECIFIED: ICD-10-CM

## 2023-05-03 DIAGNOSIS — F63.81 INTERMITTENT EXPLOSIVE DISORDER: ICD-10-CM

## 2023-05-03 LAB
ALBUMIN SERPL ELPH-MCNC: 4.4 G/DL — SIGNIFICANT CHANGE UP (ref 3.3–5.2)
ALP SERPL-CCNC: 72 U/L — SIGNIFICANT CHANGE UP (ref 40–120)
ALT FLD-CCNC: 23 U/L — SIGNIFICANT CHANGE UP
ANION GAP SERPL CALC-SCNC: 11 MMOL/L — SIGNIFICANT CHANGE UP (ref 5–17)
APAP SERPL-MCNC: <3 UG/ML — LOW (ref 10–26)
APPEARANCE UR: ABNORMAL
AST SERPL-CCNC: 21 U/L — SIGNIFICANT CHANGE UP
BASOPHILS # BLD AUTO: 0.03 K/UL — SIGNIFICANT CHANGE UP (ref 0–0.2)
BASOPHILS NFR BLD AUTO: 0.8 % — SIGNIFICANT CHANGE UP (ref 0–2)
BILIRUB SERPL-MCNC: 0.3 MG/DL — LOW (ref 0.4–2)
BILIRUB UR-MCNC: NEGATIVE — SIGNIFICANT CHANGE UP
BUN SERPL-MCNC: 12.4 MG/DL — SIGNIFICANT CHANGE UP (ref 8–20)
CALCIUM SERPL-MCNC: 9.1 MG/DL — SIGNIFICANT CHANGE UP (ref 8.4–10.5)
CHLORIDE SERPL-SCNC: 106 MMOL/L — SIGNIFICANT CHANGE UP (ref 96–108)
CO2 SERPL-SCNC: 27 MMOL/L — SIGNIFICANT CHANGE UP (ref 22–29)
COLOR SPEC: YELLOW — SIGNIFICANT CHANGE UP
CREAT SERPL-MCNC: 0.87 MG/DL — SIGNIFICANT CHANGE UP (ref 0.5–1.3)
DIFF PNL FLD: NEGATIVE — SIGNIFICANT CHANGE UP
EGFR: 122 ML/MIN/1.73M2 — SIGNIFICANT CHANGE UP
EOSINOPHIL # BLD AUTO: 0.08 K/UL — SIGNIFICANT CHANGE UP (ref 0–0.5)
EOSINOPHIL NFR BLD AUTO: 2 % — SIGNIFICANT CHANGE UP (ref 0–6)
ETHANOL SERPL-MCNC: <10 MG/DL — SIGNIFICANT CHANGE UP (ref 0–9)
GLUCOSE SERPL-MCNC: 92 MG/DL — SIGNIFICANT CHANGE UP (ref 70–99)
GLUCOSE UR QL: NEGATIVE MG/DL — SIGNIFICANT CHANGE UP
HCT VFR BLD CALC: 41.5 % — SIGNIFICANT CHANGE UP (ref 39–50)
HGB BLD-MCNC: 13.2 G/DL — SIGNIFICANT CHANGE UP (ref 13–17)
IMM GRANULOCYTES NFR BLD AUTO: 0.3 % — SIGNIFICANT CHANGE UP (ref 0–0.9)
KETONES UR-MCNC: ABNORMAL
LEUKOCYTE ESTERASE UR-ACNC: NEGATIVE — SIGNIFICANT CHANGE UP
LYMPHOCYTES # BLD AUTO: 0.85 K/UL — LOW (ref 1–3.3)
LYMPHOCYTES # BLD AUTO: 21.4 % — SIGNIFICANT CHANGE UP (ref 13–44)
MCHC RBC-ENTMCNC: 26.9 PG — LOW (ref 27–34)
MCHC RBC-ENTMCNC: 31.8 GM/DL — LOW (ref 32–36)
MCV RBC AUTO: 84.7 FL — SIGNIFICANT CHANGE UP (ref 80–100)
MONOCYTES # BLD AUTO: 0.6 K/UL — SIGNIFICANT CHANGE UP (ref 0–0.9)
MONOCYTES NFR BLD AUTO: 15.1 % — HIGH (ref 2–14)
NEUTROPHILS # BLD AUTO: 2.41 K/UL — SIGNIFICANT CHANGE UP (ref 1.8–7.4)
NEUTROPHILS NFR BLD AUTO: 60.4 % — SIGNIFICANT CHANGE UP (ref 43–77)
NITRITE UR-MCNC: NEGATIVE — SIGNIFICANT CHANGE UP
PCP SPEC-MCNC: SIGNIFICANT CHANGE UP
PH UR: 6.5 — SIGNIFICANT CHANGE UP (ref 5–8)
PLATELET # BLD AUTO: 223 K/UL — SIGNIFICANT CHANGE UP (ref 150–400)
POTASSIUM SERPL-MCNC: 4.4 MMOL/L — SIGNIFICANT CHANGE UP (ref 3.5–5.3)
POTASSIUM SERPL-SCNC: 4.4 MMOL/L — SIGNIFICANT CHANGE UP (ref 3.5–5.3)
PROT SERPL-MCNC: 6.3 G/DL — LOW (ref 6.6–8.7)
PROT UR-MCNC: 15
RBC # BLD: 4.9 M/UL — SIGNIFICANT CHANGE UP (ref 4.2–5.8)
RBC # FLD: 13.6 % — SIGNIFICANT CHANGE UP (ref 10.3–14.5)
SALICYLATES SERPL-MCNC: <0.6 MG/DL — LOW (ref 10–20)
SARS-COV-2 RNA SPEC QL NAA+PROBE: SIGNIFICANT CHANGE UP
SODIUM SERPL-SCNC: 144 MMOL/L — SIGNIFICANT CHANGE UP (ref 135–145)
SP GR SPEC: 1.02 — SIGNIFICANT CHANGE UP (ref 1.01–1.02)
UROBILINOGEN FLD QL: 1 MG/DL
WBC # BLD: 3.98 K/UL — SIGNIFICANT CHANGE UP (ref 3.8–10.5)
WBC # FLD AUTO: 3.98 K/UL — SIGNIFICANT CHANGE UP (ref 3.8–10.5)

## 2023-05-03 PROCEDURE — 81001 URINALYSIS AUTO W/SCOPE: CPT

## 2023-05-03 PROCEDURE — 93010 ELECTROCARDIOGRAM REPORT: CPT

## 2023-05-03 PROCEDURE — 80307 DRUG TEST PRSMV CHEM ANLYZR: CPT

## 2023-05-03 PROCEDURE — 99284 EMERGENCY DEPT VISIT MOD MDM: CPT

## 2023-05-03 PROCEDURE — 36415 COLL VENOUS BLD VENIPUNCTURE: CPT

## 2023-05-03 PROCEDURE — 90792 PSYCH DIAG EVAL W/MED SRVCS: CPT

## 2023-05-03 PROCEDURE — 93005 ELECTROCARDIOGRAM TRACING: CPT

## 2023-05-03 PROCEDURE — U0005: CPT

## 2023-05-03 PROCEDURE — 80053 COMPREHEN METABOLIC PANEL: CPT

## 2023-05-03 PROCEDURE — U0003: CPT

## 2023-05-03 PROCEDURE — 99285 EMERGENCY DEPT VISIT HI MDM: CPT

## 2023-05-03 PROCEDURE — 85025 COMPLETE CBC W/AUTO DIFF WBC: CPT

## 2023-05-03 RX ORDER — CHLORPROMAZINE HCL 10 MG
2 TABLET ORAL
Refills: 0 | DISCHARGE

## 2023-05-03 RX ORDER — RISPERIDONE 4 MG/1
1 TABLET ORAL
Refills: 0 | DISCHARGE

## 2023-05-03 RX ORDER — METOPROLOL TARTRATE 50 MG
0.5 TABLET ORAL
Qty: 0 | Refills: 0 | DISCHARGE

## 2023-05-03 RX ORDER — CHLORPROMAZINE HCL 10 MG
0 TABLET ORAL
Qty: 0 | Refills: 0 | DISCHARGE

## 2023-05-03 RX ORDER — LAMOTRIGINE 25 MG/1
1 TABLET, ORALLY DISINTEGRATING ORAL
Qty: 0 | Refills: 0 | DISCHARGE

## 2023-05-03 RX ORDER — FLUTICASONE PROPIONATE 50 MCG
1 SPRAY, SUSPENSION NASAL
Refills: 0 | DISCHARGE

## 2023-05-03 NOTE — ED BEHAVIORAL HEALTH ASSESSMENT NOTE - SUMMARY
Patient a 27 y/p male, domiciled in a group home,  psychiatric hx of Intermittent explosive disorder, Mood disorder, ADHD, MR, Autism, no drug, h/o self harm, biting hand, no medical issues was bib/EMS activated by staff as she was aggressive at the group home.    Patient evaluated at bedside, in bed,  smiling  inappropriately, limited responses to questions, reports today he was "acting out" because he did not want to go to [place].  He began throwing things and was attempting to be aggressive with staff, however did not hurt anyone.  Pt now calm cooperative ate a meal.  Sleep and appeitite is good.  Denies SIIP but bit his hand earlier today.  Denies HIIP, no current agitation or aggression.  Denies AH/VH but has had in past.  Pt seen by out Pt provider yesterday and meds were increased.  Collateral, Beny reports this is not new behavior and has outburst several times per month. Pt does not benefit from psych admission as behavior is chronic and not expected to improve with psych admission.

## 2023-05-03 NOTE — ED BEHAVIORAL HEALTH ASSESSMENT NOTE - DETAILS
spoke with staff member None Pt agreeable to safety plan chronic bouts of agitation in context of frustration intolerance

## 2023-05-03 NOTE — ED ADULT NURSE NOTE - OBJECTIVE STATEMENT
Patient presented in  area dressed in hospital gowns.  Patient accompanied by staff member from residence.  Patient presents calm and childlike during assessment.  Patient has a intellectual disability and CP. Patient frequently ask for staff to answer questions.  Patient reported became upset today when there was a planned outing to a location he did not want to go to.  Patient was agitated and slamming doors.  Per staff patient was able to calm down prior to [facility] arrival and when notified he was being taken to ED he attempted to induce vomiting.  Patient has multiple past ED presentations for agitation at his residences.  Patient has been compliant with his medication as per patient medication record.  Patient denies suicidal or homicidal ideations.  Cooperative with metal detection.  Patient oriented to  area and staff.

## 2023-05-03 NOTE — ED BEHAVIORAL HEALTH ASSESSMENT NOTE - NSBHMSEMOVE_PSY_A_CORE
Eye Protection Verbiage: Before proceeding with the stage, a plastic scleral shield was inserted. The globe was anesthetized with a few drops of 1% lidocaine with 1:100,000 epinephrine. Then, an appropriate sized scleral shield was chosen and coated with lacrilube ointment. The shield was gently inserted and left in place for the duration of each stage. After the stage was completed, the shield was gently removed. No abnormal movements

## 2023-05-03 NOTE — ED BEHAVIORAL HEALTH ASSESSMENT NOTE - CURRENT MEDICATION
Benztropine 1 mg bid, Clonidine 0.2 mg bid, Thorazine 150 mg bid, Risperdal 3 mg bid, Metoprolol 25 mg 1/2 tab bid (12.5), Risperdal 0.5 mg am, Thera-m-plus 1 tab qd, Flonase one nostril qd

## 2023-05-03 NOTE — ED BEHAVIORAL HEALTH ASSESSMENT NOTE - VETERAN
CC:  Ellyn Sainz is here today for Office Visit (Establish care / stomach issues )      Patient c/o abdominal pain that radiates to her back for about 3 days. Patient states she noticed her stomach aches with certain foods and denies constipation.       Medications: currently is not taking any medications  Added preferred pharmacy  Refills needed today?  NO    denies Latex allergy or sensitivity    Health Maintenance Due   Topic Date Due   • Hepatitis C Screening  Never done   • COVID-19 Vaccine (2 - Moderna series) 05/26/2021   • Depression Screening  10/18/2022       Patient is due for topics as listed above but is not proceeding with Immunization(s) COVID-19 and Hepatitis C Screening at this time. To discuss with MD.     Recent PHQ 2/9 Score    PHQ 2:  Date Adult PHQ 2 Score Adult PHQ 2 Interpretation   4/13/2023 0 No further screening needed       PHQ 9:         Patient would like communication of their results via:      [employer]    Cell Phone:   Telephone Information:   Mobile [phone]     Okay to leave a message containing results? Yes       No

## 2023-05-03 NOTE — ED ADULT NURSE REASSESSMENT NOTE - NS ED NURSE REASSESS COMMENT FT1
Reviewed discharge instruction with patient and residence staff.  Patient provided a copy of discharge instructions.  No attempts to harm self or others.  Safety of patient maintained.

## 2023-05-03 NOTE — ED PROVIDER NOTE - OBJECTIVE STATEMENT
26 y/o male with PMHx of CP, MR, bipolar, presents to the ED after episode of aggressive behavior at his group home after he became upset. In ED with no complaints and is not agitated

## 2023-05-03 NOTE — ED PROVIDER NOTE - PATIENT PORTAL LINK FT
You can access the FollowMyHealth Patient Portal offered by Health system by registering at the following website: [email or web address]. By joining [employer]’s FollowMyHealth portal, you will also be able to view your health information using other applications (apps) compatible with our system.

## 2023-05-03 NOTE — ED BEHAVIORAL HEALTH ASSESSMENT NOTE - DESCRIPTION
None T(C): 36.7 (05-03-23 @ 13:52), Max: 36.7 (05-03-23 @ 13:25)  T(F): 98 (05-03-23 @ 13:52), Max: 98.1 (05-03-23 @ 13:25)  HR: 93 (05-03-23 @ 13:52) (93 - 103)  BP: 126/78 (05-03-23 @ 13:52) (118/70 - 126/78)  RR: 18 (05-03-23 @ 13:52) (18 - 18)  SpO2: 100% (05-03-23 @ 13:52) (100% - 100%) Single male, unemployed and autistic

## 2023-05-03 NOTE — ED PROVIDER NOTE - PHYSICAL EXAMINATION
Gen: Well appearing in NAD  Head: NC/AT  Neck: trachea midline  Resp:  No distress  Ext: no deformities  Neuro:  A&O appears non focal  Skin:  Warm and dry as visualized  Psych:  calm and cooperative

## 2023-05-03 NOTE — ED ADULT TRIAGE NOTE - CHIEF COMPLAINT QUOTE
patient with aggression at group home original plan was to send to psych but as per group home staff when patient learned they were sending him he started putting his finger down his throat to induce vomiting. pt denies SI/HI at this time.  no medical complaints.

## 2023-05-03 NOTE — ED BEHAVIORAL HEALTH ASSESSMENT NOTE - HPI (INCLUDE ILLNESS QUALITY, SEVERITY, DURATION, TIMING, CONTEXT, MODIFYING FACTORS, ASSOCIATED SIGNS AND SYMPTOMS)
Patient a 27 y/p male, domiciled in a group home,  psychiatric hx of Intermittent explosive disorder, Mood disorder, ADHD, MR, Autism, no drug, h/o self harm, biting hand, no medical issues was bib/EMS activated by staff as she was aggressive at the group home.    Patient evaluated at bedside, in bed,  smiling  inappropriately, limited responses to questions, reports today he was "acting out" because he did not want to go to [place].  He began throwing things and was attempting to be aggressive with staff, however did not hurt anyone.  Pt now calm cooperative ate a meal.  Sleep and appetite is good.  Denies SIIP but bit his hand earlier today.  Denies HIIP, no current agitation or aggression.  Denies AH/VH but has had in past.  Pt seen by out Pt provider yesterday and meds were increased.  Collateral, Beny reports this is not new behavior and has outburst several times per month. Pt does not benefit from psych admission as behavior is chronic and not expected to improve with psych admission.

## 2023-05-08 ENCOUNTER — EMERGENCY (EMERGENCY)
Facility: HOSPITAL | Age: 28
LOS: 1 days | Discharge: HOME | End: 2023-05-08
Attending: STUDENT IN AN ORGANIZED HEALTH CARE EDUCATION/TRAINING PROGRAM
Payer: MEDICAID

## 2023-05-08 VITALS
RESPIRATION RATE: 20 BRPM | SYSTOLIC BLOOD PRESSURE: 127 MMHG | OXYGEN SATURATION: 98 % | DIASTOLIC BLOOD PRESSURE: 71 MMHG | TEMPERATURE: 99 F | HEART RATE: 120 BPM

## 2023-05-08 LAB
AMPHET UR-MCNC: NEGATIVE — SIGNIFICANT CHANGE UP
ANION GAP SERPL CALC-SCNC: 13 MMOL/L — SIGNIFICANT CHANGE UP (ref 5–17)
APAP SERPL-MCNC: <3 UG/ML — LOW (ref 10–26)
APPEARANCE UR: CLEAR — SIGNIFICANT CHANGE UP
BARBITURATES UR SCN-MCNC: NEGATIVE — SIGNIFICANT CHANGE UP
BASOPHILS # BLD AUTO: 0.03 K/UL — SIGNIFICANT CHANGE UP (ref 0–0.2)
BASOPHILS NFR BLD AUTO: 0.7 % — SIGNIFICANT CHANGE UP (ref 0–2)
BENZODIAZ UR-MCNC: NEGATIVE — SIGNIFICANT CHANGE UP
BILIRUB UR-MCNC: NEGATIVE — SIGNIFICANT CHANGE UP
BUN SERPL-MCNC: 13.9 MG/DL — SIGNIFICANT CHANGE UP (ref 8–20)
CALCIUM SERPL-MCNC: 9.4 MG/DL — SIGNIFICANT CHANGE UP (ref 8.4–10.5)
CHLORIDE SERPL-SCNC: 103 MMOL/L — SIGNIFICANT CHANGE UP (ref 96–108)
CO2 SERPL-SCNC: 26 MMOL/L — SIGNIFICANT CHANGE UP (ref 22–29)
COCAINE METAB.OTHER UR-MCNC: NEGATIVE — SIGNIFICANT CHANGE UP
COLOR SPEC: YELLOW — SIGNIFICANT CHANGE UP
CREAT SERPL-MCNC: 0.83 MG/DL — SIGNIFICANT CHANGE UP (ref 0.5–1.3)
DIFF PNL FLD: NEGATIVE — SIGNIFICANT CHANGE UP
EGFR: 124 ML/MIN/1.73M2 — SIGNIFICANT CHANGE UP
EOSINOPHIL # BLD AUTO: 0.12 K/UL — SIGNIFICANT CHANGE UP (ref 0–0.5)
EOSINOPHIL NFR BLD AUTO: 2.7 % — SIGNIFICANT CHANGE UP (ref 0–6)
ETHANOL SERPL-MCNC: <10 MG/DL — SIGNIFICANT CHANGE UP (ref 0–9)
FLUAV AG NPH QL: SIGNIFICANT CHANGE UP
FLUBV AG NPH QL: SIGNIFICANT CHANGE UP
GLUCOSE SERPL-MCNC: 93 MG/DL — SIGNIFICANT CHANGE UP (ref 70–99)
GLUCOSE UR QL: NEGATIVE MG/DL — SIGNIFICANT CHANGE UP
HCT VFR BLD CALC: 38.7 % — LOW (ref 39–50)
HGB BLD-MCNC: 12.4 G/DL — LOW (ref 13–17)
IMM GRANULOCYTES NFR BLD AUTO: 0.5 % — SIGNIFICANT CHANGE UP (ref 0–0.9)
KETONES UR-MCNC: NEGATIVE — SIGNIFICANT CHANGE UP
LEUKOCYTE ESTERASE UR-ACNC: NEGATIVE — SIGNIFICANT CHANGE UP
LYMPHOCYTES # BLD AUTO: 0.87 K/UL — LOW (ref 1–3.3)
LYMPHOCYTES # BLD AUTO: 19.9 % — SIGNIFICANT CHANGE UP (ref 13–44)
MCHC RBC-ENTMCNC: 27.1 PG — SIGNIFICANT CHANGE UP (ref 27–34)
MCHC RBC-ENTMCNC: 32 GM/DL — SIGNIFICANT CHANGE UP (ref 32–36)
MCV RBC AUTO: 84.7 FL — SIGNIFICANT CHANGE UP (ref 80–100)
METHADONE UR-MCNC: NEGATIVE — SIGNIFICANT CHANGE UP
MONOCYTES # BLD AUTO: 0.6 K/UL — SIGNIFICANT CHANGE UP (ref 0–0.9)
MONOCYTES NFR BLD AUTO: 13.7 % — SIGNIFICANT CHANGE UP (ref 2–14)
NEUTROPHILS # BLD AUTO: 2.74 K/UL — SIGNIFICANT CHANGE UP (ref 1.8–7.4)
NEUTROPHILS NFR BLD AUTO: 62.5 % — SIGNIFICANT CHANGE UP (ref 43–77)
NITRITE UR-MCNC: NEGATIVE — SIGNIFICANT CHANGE UP
OPIATES UR-MCNC: NEGATIVE — SIGNIFICANT CHANGE UP
PCP SPEC-MCNC: SIGNIFICANT CHANGE UP
PCP UR-MCNC: NEGATIVE — SIGNIFICANT CHANGE UP
PH UR: 7 — SIGNIFICANT CHANGE UP (ref 5–8)
PLATELET # BLD AUTO: 203 K/UL — SIGNIFICANT CHANGE UP (ref 150–400)
POTASSIUM SERPL-MCNC: 4.2 MMOL/L — SIGNIFICANT CHANGE UP (ref 3.5–5.3)
POTASSIUM SERPL-SCNC: 4.2 MMOL/L — SIGNIFICANT CHANGE UP (ref 3.5–5.3)
PROT UR-MCNC: NEGATIVE — SIGNIFICANT CHANGE UP
RBC # BLD: 4.57 M/UL — SIGNIFICANT CHANGE UP (ref 4.2–5.8)
RBC # FLD: 14.1 % — SIGNIFICANT CHANGE UP (ref 10.3–14.5)
RSV RNA NPH QL NAA+NON-PROBE: SIGNIFICANT CHANGE UP
SALICYLATES SERPL-MCNC: <0.6 MG/DL — LOW (ref 10–20)
SARS-COV-2 RNA SPEC QL NAA+PROBE: SIGNIFICANT CHANGE UP
SODIUM SERPL-SCNC: 141 MMOL/L — SIGNIFICANT CHANGE UP (ref 135–145)
SP GR SPEC: 1 — LOW (ref 1.01–1.02)
THC UR QL: NEGATIVE — SIGNIFICANT CHANGE UP
UROBILINOGEN FLD QL: NEGATIVE MG/DL — SIGNIFICANT CHANGE UP
WBC # BLD: 4.38 K/UL — SIGNIFICANT CHANGE UP (ref 3.8–10.5)
WBC # FLD AUTO: 4.38 K/UL — SIGNIFICANT CHANGE UP (ref 3.8–10.5)

## 2023-05-08 PROCEDURE — 93010 ELECTROCARDIOGRAM REPORT: CPT

## 2023-05-08 PROCEDURE — 90792 PSYCH DIAG EVAL W/MED SRVCS: CPT

## 2023-05-08 PROCEDURE — 99285 EMERGENCY DEPT VISIT HI MDM: CPT

## 2023-05-08 RX ORDER — RISPERIDONE 4 MG/1
3 TABLET ORAL
Refills: 0 | Status: DISCONTINUED | OUTPATIENT
Start: 2023-05-08 | End: 2023-05-16

## 2023-05-08 RX ORDER — BENZTROPINE MESYLATE 1 MG
1 TABLET ORAL AT BEDTIME
Refills: 0 | Status: DISCONTINUED | OUTPATIENT
Start: 2023-05-08 | End: 2023-05-16

## 2023-05-08 RX ORDER — LAMOTRIGINE 25 MG/1
200 TABLET, ORALLY DISINTEGRATING ORAL
Refills: 0 | Status: DISCONTINUED | OUTPATIENT
Start: 2023-05-08 | End: 2023-05-16

## 2023-05-08 RX ORDER — CHLORPROMAZINE HCL 10 MG
200 TABLET ORAL
Refills: 0 | Status: DISCONTINUED | OUTPATIENT
Start: 2023-05-08 | End: 2023-05-16

## 2023-05-08 NOTE — ED PROVIDER NOTE - CLINICAL SUMMARY MEDICAL DECISION MAKING FREE TEXT BOX
26 yo male with Psychiatric history sent in from group home for aggressive behavior. Check labs. 1:1 in place. Consult . Monitor and reassess.

## 2023-05-08 NOTE — ED PROVIDER NOTE - PROGRESS NOTE DETAILS
Patient is here today accompanied by her friend, CJ,and her son, is agreeable to discuss all medical problems and management without reservations in the presence of the accompanist(s).   Patient has remained stable during ED evaluation.

## 2023-05-08 NOTE — ED PROVIDER NOTE - ATTENDING CONTRIBUTION TO CARE
I personally saw the patient with the resident, and completed the key components of the history and physical exam. I then discussed the management plan with the resident.    28 y/o M with CP, MR presents for agitation - history limited due to cognitive delay.    I agree with exam as documented.    Will medically clear for psych evaluation and treatment.

## 2023-05-08 NOTE — ED BEHAVIORAL HEALTH ASSESSMENT NOTE - DETAILS
chronic bouts of agitation in context of frustration intolerance spoke with staff member None Pt agreeable to safety plan spoke with  discussed with staff

## 2023-05-08 NOTE — ED ADULT NURSE NOTE - CHIEF COMPLAINT QUOTE
pt was brought in by EMS from [facility] home threatening to cut his penis and feed it to the staff, pt is accompany by [facility], placed on yellow gown

## 2023-05-08 NOTE — ED BEHAVIORAL HEALTH ASSESSMENT NOTE - SAFETY PLAN ADDT'L DETAILS
Safety plan discussed with... Education provided regarding environmental safety / lethal means restriction/Provision of National Suicide Prevention Lifeline 3-617-818-TALK (3300)

## 2023-05-08 NOTE — ED BEHAVIORAL HEALTH ASSESSMENT NOTE - CURRENT MEDICATION
Lamotrigine 200 mg BID, Benztropine 1 mg at night, Clonidine 0.2 mg bid, Thorazine 200  mg bid, Risperdal 3 mg bid, Metoprolol 25 mg 1/2 tab bid (12.5), Risperdal 0.5 mg am, Thera-m-plus 1 tab qd, Flonase one nostril qd

## 2023-05-08 NOTE — ED PROVIDER NOTE - OBJECTIVE STATEMENT
28 yo male history of CP, MR, bipolar disorder sent in from group home after "threatening to cut his penis off and feed it to the staff". Patient brought in by PD, placed in yellow gown. Hx from patient limited secondary to cognitive delay.

## 2023-05-08 NOTE — ED PROVIDER NOTE - NSFOLLOWUPINSTRUCTIONS_ED_ALL_ED_FT
1) Follow up with your doctor in 1-2 days  2) Return to the ER for worsening or concerning symptoms    Intermittent Explosive Disorder    Intermittent explosive disorder (IED) is a mental health disorder in which a person has trouble controlling his or her angry impulses. A person with this disorder may repeatedly have sudden episodes of aggression (explosive rages). The rages are unplanned and too much for the situation. They can result in serious injury to people or animals, or property damage. IED may interfere with relationships, school, or work, and may cause legal or financial problems.    Intermittent explosive disorder most commonly starts in childhood or the teenage years. It may go away after 10–20 years or may last for life. People with IED often have one or more other mental disorders, such as other impulse control disorders, depression, anxiety, or substance abuse. People with this disorder are also at higher risk for self-injury and suicide attempts.    What are the causes?  The cause of IED is not known.    What increases the risk?  You are more likely to develop this condition if:    You are male.  You were abused during childhood.  You have a family member who has IED, a different impulse control disorder, or other mental health issues.    What are the signs or symptoms?  Explosive rages may be verbal, physical, or both. Examples of verbal rages may include:    Temper tantrums after the age of 6 years.  Verbal arguments or fights.  Vicious name calling, cursing, or insulting others.  Road rage.    Physical rages are directed at people, animals, or property. They may or may not cause injury or damage. Examples of physical aggression may include:    Pushing, slapping, punching, choking, or kicking.  Punching holes in walls, breaking furniture, or throwing items.    Verbal or physical rages:    May happen an average of twice a week for 3 months or longer. Rages causing physical injury or property damage may occur at least three times a year.  Usually last less than 30 minutes.    People with IED may want to change their behavior but are often unable to monitor and control their outbursts by themselves. You may feel remorseful or embarrassed after a rage. In between rages, there may be no signs of anger, or you may be irritable. Before or during a rage, you may have one or more of the following sensations:    Tingling.  Shaking (tremors).  Pounding heart.  Chest tightness.  Head pressure.  Hearing an echo.    How is this diagnosed?  Intermittent explosive disorder is diagnosed through an assessment by your health care provider. He or she may ask about:    Your rages and how they affect your life.   Your moods, thoughts, and other behaviors.  Your medical history.  Your use of medicines or drugs.    Your health care provider may do a physical exam and order lab tests or brain imaging tests. You may be evaluated by a mental health professional.    How is this treated?     This condition is treated by mental health specialists. Treatment may include:    Therapy. You may have therapy that:    Focuses on your underlying feelings and reasons that lead to rages.   Evaluates your triggers for rages and ways to prevent or control them (cognitive behavioral therapy, CBT). CBT in a group setting may be especially helpful.  Support groups. These can provide emotional support, advice, and guidance.  Medicine to help reduce the frequency and severity of explosive rages. There are several types of medicines that may be used, including certain antidepressants, mood stabilizers, anti-seizure medicines (anticonvulsants), major tranquilizers (antipsychotics), and beta blockers.    The most effective treatment is usually a combination of therapy, support groups, and medicine. You will also be treated for any other mental health problems you have.    Follow these instructions at home:     Take over-the-counter and prescription medicines only as told by your health care provider.   Do not start taking any new over-the-counter or prescription medicines before first getting approval from your health care provider.  Attend therapy and support groups as recommended.  Try to avoid situations and people that upset you.  Keep all follow-up visits as told by your health care provider. This is important.    Contact a health care provider if you:  Have worsening symptoms.  Are not able to take your medicine as told.    Get help right away if you have:  Thoughts about hurting yourself or someone else.  Seriously injured yourself or someone else.    If you ever feel like you may hurt yourself or others, or have thoughts about taking your own life, get help right away. You can go to your nearest emergency department or call:    Your local emergency services (911 in the [place]  A suicide crisis helpline, such as the National Suicide Prevention Lifeline at 1-773.915.8739. This is open 24 hours a day.    Summary  Intermittent explosive disorder (IED) is a mental health disorder in which a person has trouble controlling his or her angry impulses.  Intermittent explosive disorder most commonly starts in childhood or the teenage years.  People with IED may want to change their behavior but are often unable to monitor and control their outbursts by themselves.  The most effective treatment is usually a combination of therapy, support groups, and medicine. Treatment will also focus on any other mental health problems you have.    ADDITIONAL NOTES AND INSTRUCTIONS    Please follow up with your Primary MD in 24-48 hr.  Seek immediate medical care for any new/worsening signs or symptoms.
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
## 2023-05-08 NOTE — ED ADULT NURSE NOTE - NSSUHOSCREENINGYN_ED_ALL_ED
Group A Streptococcus PCR is NEGATIVE  No treatment or change in treatment [facility] ED lab result Strep Group A protocol. Yes - the patient is able to be screened

## 2023-05-08 NOTE — ED BEHAVIORAL HEALTH ASSESSMENT NOTE - DESCRIPTION
None ICU Vital Signs Last 24 Hrs  T(C): 37.1 (08 May 2023 17:04), Max: 37.1 (08 May 2023 17:04)  T(F): 98.7 (08 May 2023 17:04), Max: 98.7 (08 May 2023 17:04)  HR: 120 (08 May 2023 17:04) (120 - 120)  BP: 127/71 (08 May 2023 17:04) (127/71 - 127/71)  BP(mean): --  ABP: --  ABP(mean): --  RR: 20 (08 May 2023 17:04) (20 - 20)  SpO2: 98% (08 May 2023 17:04) (98% - 98%)    O2 Parameters below as of 08 May 2023 17:04  Patient On (Oxygen Delivery Method): room air Single male, unemployed and autistic Patient was calm, cooperative, smiling in appropriately at times. He gave limited response, with concrete thought process, denied any S/H I/I/P. He was not aggressive or agitated in ED. Labwork unremarkable. UDS negative.       ICU Vital Signs Last 24 Hrs  T(C): 37.1 (08 May 2023 17:04), Max: 37.1 (08 May 2023 17:04)  T(F): 98.7 (08 May 2023 17:04), Max: 98.7 (08 May 2023 17:04)  HR: 120 (08 May 2023 17:04) (120 - 120)  BP: 127/71 (08 May 2023 17:04) (127/71 - 127/71)  BP(mean): --  ABP: --  ABP(mean): --  RR: 20 (08 May 2023 17:04) (20 - 20)  SpO2: 98% (08 May 2023 17:04) (98% - 98%)    O2 Parameters below as of 08 May 2023 17:04  Patient On (Oxygen Delivery Method): room air

## 2023-05-08 NOTE — ED PROVIDER NOTE - PHYSICAL EXAMINATION
Gen: NAD.   Head: NC/AT  Neck: trachea midline  Resp:  No distress, lungs cta b/l  Cardiac: heart rrr. No murmur.   Abdomen: Soft, nontender.   Ext: no deformities  Neuro:  A&O non focal exam.   Skin:  Warm and dry as visualized  Psych: Responding to internal stimuli. Making hand motions.

## 2023-05-08 NOTE — ED PROVIDER NOTE - SHIFT CHANGE DETAILS
Patient signed out pending psych reassessment, all further work up and management at the discretion of receiving physician.

## 2023-05-08 NOTE — ED PROVIDER NOTE - PATIENT PORTAL LINK FT
You can access the FollowMyHealth Patient Portal offered by [facility] by registering at the following website: [email or web address]. By joining [employer]’s FollowMyHealth portal, you will also be able to view your health information using other applications (apps) compatible with our system.

## 2023-05-08 NOTE — ED BEHAVIORAL HEALTH ASSESSMENT NOTE - SUMMARY
Patient a 27 y/p male, domiciled in a group home,  psychiatric hx of Intermittent explosive disorder, Mood disorder, ADHD, MR, Autism, no drug, h/o self harm, biting hand,  h/o multiple prior visits to ED and CPEP for episodes of agitation, no medical issues was bib/EMS activated by staff for evaluation of agitation    Patient evaluated at bedside, in bed,  smiling  inappropriately, limited responses to questions, reports today he was "acting out" because he did not want to go to [facility].       Pt now calm cooperative ate a meal.  Sleep and appeitite is good.  Denies SIIP but bit his hand earlier today.  Denies HIIP, no current agitation or aggression.  Denies AH/VH but has had in past.  Pt seen by out Pt provider yesterday and meds were increased.    Pt does not benefit from psych admission as behavior is chronic and not expected to improve with psych admission. Patient a 27 y/p male, domiciled in a group home,  psychiatric hx of Intermittent explosive disorder, Mood disorder, ADHD, MR, Autism, no drug, h/o self harm, biting hand,  h/o multiple prior visits to ED and CPEP for episodes of agitation, no medical issues was bib/EMS activated by staff for evaluation of agitation    Patient evaluated at bedside, in bed,  smiling  inappropriately, limited responses to questions, reports today he "behavior" because he did not want to go outside.  He has h/o chronic low frustration tolerance.  He has been compliant with medications and no acute symptoms were elicited or observed in ED.        Pt now calm cooperative ate a meal.  Sleep and appetite is good.  Denies SIIP .  Denies HIIP, no current agitation or aggression.  Denies AH/VH but has had in past.  Appears to be at baseline.     Pt does not benefit from psych admission as behavior is chronic and not expected to improve with psych admission.
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
## 2023-05-08 NOTE — CHART NOTE - NSCHARTNOTEFT_GEN_A_CORE
SW Note: Pt cleared both medically and psychiatrically and is ready for d/c back to . Pt is from [facility]. Worker confirmed return w/  ([facility] [phone]).  requested clinicals be faxed for review ([phone]). Ambulance to be arranged for safe d/c back through [facility] ([facility]) for safe return. NEAF created and uploaded to [facility]. RN provided safety questions to EMS. Transport letter to be left for  to review due to pts AMS. No other SW needs at this time.

## 2023-05-09 VITALS
RESPIRATION RATE: 16 BRPM | HEART RATE: 64 BPM | SYSTOLIC BLOOD PRESSURE: 119 MMHG | DIASTOLIC BLOOD PRESSURE: 71 MMHG | TEMPERATURE: 98 F | OXYGEN SATURATION: 96 %

## 2023-05-09 PROCEDURE — 93005 ELECTROCARDIOGRAM TRACING: CPT

## 2023-05-09 PROCEDURE — 85025 COMPLETE CBC W/AUTO DIFF WBC: CPT

## 2023-05-09 PROCEDURE — 87637 SARSCOV2&INF A&B&RSV AMP PRB: CPT

## 2023-05-09 PROCEDURE — 81003 URINALYSIS AUTO W/O SCOPE: CPT

## 2023-05-09 PROCEDURE — 80048 BASIC METABOLIC PNL TOTAL CA: CPT

## 2023-05-09 PROCEDURE — 80307 DRUG TEST PRSMV CHEM ANLYZR: CPT

## 2023-05-09 PROCEDURE — 36415 COLL VENOUS BLD VENIPUNCTURE: CPT

## 2023-05-09 PROCEDURE — 99285 EMERGENCY DEPT VISIT HI MDM: CPT | Mod: 25

## 2023-05-09 RX ADMIN — Medication 1 MILLIGRAM(S): at 01:28

## 2023-05-09 RX ADMIN — Medication 0.2 MILLIGRAM(S): at 06:05

## 2023-05-09 RX ADMIN — LAMOTRIGINE 200 MILLIGRAM(S): 25 TABLET, ORALLY DISINTEGRATING ORAL at 06:04

## 2023-05-09 RX ADMIN — Medication 200 MILLIGRAM(S): at 06:04

## 2023-05-09 RX ADMIN — RISPERIDONE 3 MILLIGRAM(S): 4 TABLET ORAL at 06:04

## 2023-05-09 NOTE — ED ADULT NURSE REASSESSMENT NOTE - NS ED NURSE REASSESS COMMENT FT1
pt awake and alert. respirations equal/unlabored. in no acute distress. all needs addressed at this time. safety maintained at all times.
Pt resting comfortably on stretcher. Calm and appropriate behaviors noted. All needs addressed at this time.

## 2023-05-28 ENCOUNTER — EMERGENCY (EMERGENCY)
Facility: HOSPITAL | Age: 28
LOS: 0 days | Discharge: HOME | End: 2023-05-29
Attending: EMERGENCY MEDICINE
Payer: MEDICARE

## 2023-05-28 VITALS
HEIGHT: 68 IN | WEIGHT: 154.98 LBS | OXYGEN SATURATION: 99 % | DIASTOLIC BLOOD PRESSURE: 76 MMHG | RESPIRATION RATE: 20 BRPM | HEART RATE: 120 BPM | TEMPERATURE: 98 F | SYSTOLIC BLOOD PRESSURE: 156 MMHG

## 2023-05-28 DIAGNOSIS — Z91.010 ALLERGY TO PEANUTS: ICD-10-CM

## 2023-05-28 DIAGNOSIS — R10.9 UNSPECIFIED ABDOMINAL PAIN: ICD-10-CM

## 2023-05-28 DIAGNOSIS — G80.9 CEREBRAL PALSY, UNSPECIFIED: ICD-10-CM

## 2023-05-28 DIAGNOSIS — J45.909 UNSPECIFIED ASTHMA, UNCOMPLICATED: ICD-10-CM

## 2023-05-28 DIAGNOSIS — G20 PARKINSON'S DISEASE: ICD-10-CM

## 2023-05-28 DIAGNOSIS — R19.7 DIARRHEA, UNSPECIFIED: ICD-10-CM

## 2023-05-28 DIAGNOSIS — F60.3 BORDERLINE PERSONALITY DISORDER: ICD-10-CM

## 2023-05-28 DIAGNOSIS — Z91.012 ALLERGY TO EGGS: ICD-10-CM

## 2023-05-28 DIAGNOSIS — F84.0 AUTISTIC DISORDER: ICD-10-CM

## 2023-05-28 DIAGNOSIS — Z20.822 CONTACT WITH AND (SUSPECTED) EXPOSURE TO COVID-19: ICD-10-CM

## 2023-05-28 DIAGNOSIS — F90.9 ATTENTION-DEFICIT HYPERACTIVITY DISORDER, UNSPECIFIED TYPE: ICD-10-CM

## 2023-05-28 DIAGNOSIS — F79 UNSPECIFIED INTELLECTUAL DISABILITIES: ICD-10-CM

## 2023-05-28 DIAGNOSIS — F41.9 ANXIETY DISORDER, UNSPECIFIED: ICD-10-CM

## 2023-05-28 DIAGNOSIS — Z91.018 ALLERGY TO OTHER FOODS: ICD-10-CM

## 2023-05-28 DIAGNOSIS — R45.1 RESTLESSNESS AND AGITATION: ICD-10-CM

## 2023-05-28 DIAGNOSIS — F63.81 INTERMITTENT EXPLOSIVE DISORDER: ICD-10-CM

## 2023-05-28 DIAGNOSIS — F39 UNSPECIFIED MOOD [AFFECTIVE] DISORDER: ICD-10-CM

## 2023-05-28 LAB
ALBUMIN SERPL ELPH-MCNC: 4.2 G/DL — SIGNIFICANT CHANGE UP (ref 3.3–5)
ALP SERPL-CCNC: 68 U/L — SIGNIFICANT CHANGE UP (ref 40–120)
ALT FLD-CCNC: 26 U/L — SIGNIFICANT CHANGE UP (ref 12–78)
ANION GAP SERPL CALC-SCNC: 6 MMOL/L — SIGNIFICANT CHANGE UP (ref 5–17)
APAP SERPL-MCNC: <2 UG/ML — LOW (ref 10–30)
AST SERPL-CCNC: 16 U/L — SIGNIFICANT CHANGE UP (ref 15–37)
BASOPHILS # BLD AUTO: 0.06 K/UL — SIGNIFICANT CHANGE UP (ref 0–0.2)
BASOPHILS NFR BLD AUTO: 1.1 % — SIGNIFICANT CHANGE UP (ref 0–2)
BILIRUB SERPL-MCNC: 0.3 MG/DL — SIGNIFICANT CHANGE UP (ref 0.2–1.2)
BUN SERPL-MCNC: 11 MG/DL — SIGNIFICANT CHANGE UP (ref 7–23)
CALCIUM SERPL-MCNC: 9.4 MG/DL — SIGNIFICANT CHANGE UP (ref 8.5–10.1)
CHLORIDE SERPL-SCNC: 111 MMOL/L — HIGH (ref 96–108)
CO2 SERPL-SCNC: 28 MMOL/L — SIGNIFICANT CHANGE UP (ref 22–31)
CREAT SERPL-MCNC: 0.99 MG/DL — SIGNIFICANT CHANGE UP (ref 0.5–1.3)
EGFR: 108 ML/MIN/1.73M2 — SIGNIFICANT CHANGE UP
EOSINOPHIL # BLD AUTO: 0.1 K/UL — SIGNIFICANT CHANGE UP (ref 0–0.5)
EOSINOPHIL NFR BLD AUTO: 1.8 % — SIGNIFICANT CHANGE UP (ref 0–6)
ETHANOL SERPL-MCNC: <10 MG/DL — SIGNIFICANT CHANGE UP (ref 0–10)
FLUAV AG NPH QL: SIGNIFICANT CHANGE UP
FLUBV AG NPH QL: SIGNIFICANT CHANGE UP
GLUCOSE SERPL-MCNC: 111 MG/DL — HIGH (ref 70–99)
HCT VFR BLD CALC: 41.8 % — SIGNIFICANT CHANGE UP (ref 39–50)
HGB BLD-MCNC: 13.5 G/DL — SIGNIFICANT CHANGE UP (ref 13–17)
IMM GRANULOCYTES NFR BLD AUTO: 0.4 % — SIGNIFICANT CHANGE UP (ref 0–0.9)
LYMPHOCYTES # BLD AUTO: 0.99 K/UL — LOW (ref 1–3.3)
LYMPHOCYTES # BLD AUTO: 17.6 % — SIGNIFICANT CHANGE UP (ref 13–44)
MCHC RBC-ENTMCNC: 27.7 PG — SIGNIFICANT CHANGE UP (ref 27–34)
MCHC RBC-ENTMCNC: 32.3 GM/DL — SIGNIFICANT CHANGE UP (ref 32–36)
MCV RBC AUTO: 85.8 FL — SIGNIFICANT CHANGE UP (ref 80–100)
MONOCYTES # BLD AUTO: 0.65 K/UL — SIGNIFICANT CHANGE UP (ref 0–0.9)
MONOCYTES NFR BLD AUTO: 11.5 % — SIGNIFICANT CHANGE UP (ref 2–14)
NEUTROPHILS # BLD AUTO: 3.81 K/UL — SIGNIFICANT CHANGE UP (ref 1.8–7.4)
NEUTROPHILS NFR BLD AUTO: 67.6 % — SIGNIFICANT CHANGE UP (ref 43–77)
PLATELET # BLD AUTO: 219 K/UL — SIGNIFICANT CHANGE UP (ref 150–400)
POTASSIUM SERPL-MCNC: 4.2 MMOL/L — SIGNIFICANT CHANGE UP (ref 3.5–5.3)
POTASSIUM SERPL-SCNC: 4.2 MMOL/L — SIGNIFICANT CHANGE UP (ref 3.5–5.3)
PROT SERPL-MCNC: 6.9 GM/DL — SIGNIFICANT CHANGE UP (ref 6–8.3)
RBC # BLD: 4.87 M/UL — SIGNIFICANT CHANGE UP (ref 4.2–5.8)
RBC # FLD: 14.2 % — SIGNIFICANT CHANGE UP (ref 10.3–14.5)
RSV RNA NPH QL NAA+NON-PROBE: SIGNIFICANT CHANGE UP
SALICYLATES SERPL-MCNC: <1.7 MG/DL — LOW (ref 2.8–20)
SARS-COV-2 RNA SPEC QL NAA+PROBE: SIGNIFICANT CHANGE UP
SODIUM SERPL-SCNC: 145 MMOL/L — SIGNIFICANT CHANGE UP (ref 135–145)
WBC # BLD: 5.63 K/UL — SIGNIFICANT CHANGE UP (ref 3.8–10.5)
WBC # FLD AUTO: 5.63 K/UL — SIGNIFICANT CHANGE UP (ref 3.8–10.5)

## 2023-05-28 PROCEDURE — 80175 DRUG SCREEN QUAN LAMOTRIGINE: CPT

## 2023-05-28 PROCEDURE — 71046 X-RAY EXAM CHEST 2 VIEWS: CPT | Mod: 26

## 2023-05-28 PROCEDURE — 94640 AIRWAY INHALATION TREATMENT: CPT

## 2023-05-28 PROCEDURE — 99285 EMERGENCY DEPT VISIT HI MDM: CPT | Mod: 25

## 2023-05-28 PROCEDURE — 0241U: CPT

## 2023-05-28 PROCEDURE — 80307 DRUG TEST PRSMV CHEM ANLYZR: CPT

## 2023-05-28 PROCEDURE — 74177 CT ABD & PELVIS W/CONTRAST: CPT | Mod: 26,MA

## 2023-05-28 PROCEDURE — 36415 COLL VENOUS BLD VENIPUNCTURE: CPT

## 2023-05-28 PROCEDURE — 85025 COMPLETE CBC W/AUTO DIFF WBC: CPT

## 2023-05-28 PROCEDURE — 80053 COMPREHEN METABOLIC PANEL: CPT

## 2023-05-28 PROCEDURE — 71046 X-RAY EXAM CHEST 2 VIEWS: CPT

## 2023-05-28 PROCEDURE — 93005 ELECTROCARDIOGRAM TRACING: CPT

## 2023-05-28 PROCEDURE — 99285 EMERGENCY DEPT VISIT HI MDM: CPT

## 2023-05-28 PROCEDURE — 74177 CT ABD & PELVIS W/CONTRAST: CPT | Mod: MA

## 2023-05-28 PROCEDURE — 96374 THER/PROPH/DIAG INJ IV PUSH: CPT | Mod: XU

## 2023-05-28 RX ORDER — LOPERAMIDE HCL 2 MG
2 TABLET ORAL ONCE
Refills: 0 | Status: COMPLETED | OUTPATIENT
Start: 2023-05-28 | End: 2023-05-28

## 2023-05-28 RX ORDER — SODIUM CHLORIDE 9 MG/ML
1000 INJECTION INTRAMUSCULAR; INTRAVENOUS; SUBCUTANEOUS ONCE
Refills: 0 | Status: COMPLETED | OUTPATIENT
Start: 2023-05-28 | End: 2023-05-28

## 2023-05-28 RX ORDER — ALBUTEROL 90 UG/1
2 AEROSOL, METERED ORAL ONCE
Refills: 0 | Status: COMPLETED | OUTPATIENT
Start: 2023-05-28 | End: 2023-05-28

## 2023-05-28 RX ADMIN — SODIUM CHLORIDE 1000 MILLILITER(S): 9 INJECTION INTRAMUSCULAR; INTRAVENOUS; SUBCUTANEOUS at 23:49

## 2023-05-28 RX ADMIN — Medication 0.5 MILLIGRAM(S): at 23:43

## 2023-05-28 RX ADMIN — ALBUTEROL 2 PUFF(S): 90 AEROSOL, METERED ORAL at 23:47

## 2023-05-28 RX ADMIN — Medication 2 MILLIGRAM(S): at 23:47

## 2023-05-28 NOTE — ED PROVIDER NOTE - NSFOLLOWUPINSTRUCTIONS_ED_ALL_ED_FT
Follow-up with your regular physician - see your psychiatric provider at group home this week  Return with any persistent/worsening symptoms.     No medication changes to be made. Patient on significant amount of psychiatric medication, would have psychiatric provider review history to determine which medications can be titrated for further effect

## 2023-05-28 NOTE — ED ADULT NURSE NOTE - NSFALLUNIVINTERV_ED_ALL_ED
Bed/Stretcher in lowest position, wheels locked, appropriate side rails in place/Call bell, personal items and telephone in reach/Instruct patient to call for assistance before getting out of bed/chair/stretcher/Non-slip footwear applied when patient is off stretcher/[place] to call system/Physically safe environment - no spills, clutter or unnecessary equipment/Purposeful proactive rounding/Room/bathroom lighting operational, light cord in reach

## 2023-05-28 NOTE — ED PROVIDER NOTE - PATIENT PORTAL LINK FT
You can access the FollowMyHealth Patient Portal offered by [facility] by registering at the following website: [email or web address]. By joining [employer]’s FollowMyHealth portal, you will also be able to view your health information using other applications (apps) compatible with our system.

## 2023-05-28 NOTE — ED ADULT NURSE NOTE - NSFALLRISKFACTORS_ED_ALL_ED
Caller would like to discuss an/a Appointment for test. Writer advised caller of callback within 24-72 hours.    Patient Name: Marcelle Palacios  Caller Name: self  Name of Facility: [facility]  Callback Number: [phone]  Best Availability: anytime  Can A Detailed Message Be left? yes  Fax Number: [phone]  Additional Info: Pt called he would like to schedule a chicken pox test.. Please call pt to schedule if this is available.  Did you confirm the message with the caller?: yes    Thank you,  Beata Mccloud   No indicators present

## 2023-05-28 NOTE — ED ADULT NURSE NOTE - HISTORY OF COVID-19 VACCINATION
Reviewed breastfeeding techniques and positions with mother until found a position she was most comfortable with. Reminded mother of early feeding cues and that breast fed infants should be fed on demand without time restriction on the first breast until the infant seems satisfied. Then the second breast is offered. Advised mother to awaken  to feed if three hours have passed since baby last ate. Will continue to monitor mother's progress with breastfeeding and offer assistance at any time. Pt will successfully establish breastfeeding by feeding in response to early feeding cues or wake every 3h, will obtain deep latch, and will keep log of feedings/output. Taught to BF at hunger cues and or q 2-3 hrs and to offer 10-20 drops of hand expressed colostrum at any non-feeds.       Breast Assessment  Left Breast: Medium  Left Nipple: Everted, Intact  Right Breast: Medium  Right Nipple: Everted, Intact  Breast- Feeding Assessment  Attends Breast-Feeding Classes: No(own research online)  Breast-Feeding Experience: No  Breast Trauma/Surgery: No  Type/Quality: Good(per mother)  Lactation Consultant Visits  Breast-Feedings: (did not see at breast, mother encouraged to call next feed) Vaccine status unknown
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
## 2023-05-28 NOTE — ED PROVIDER NOTE - CLINICAL SUMMARY MEDICAL DECISION MAKING FREE TEXT BOX
26 yo M with diarrhea from overeating or viral source.  Hx of MR and CP and mood disorder with increasing outbursts and agitation at group home.  Medically cleared and psych eval requested.
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
## 2023-05-29 VITALS
RESPIRATION RATE: 18 BRPM | DIASTOLIC BLOOD PRESSURE: 57 MMHG | TEMPERATURE: 98 F | OXYGEN SATURATION: 99 % | SYSTOLIC BLOOD PRESSURE: 110 MMHG | HEART RATE: 65 BPM

## 2023-05-29 DIAGNOSIS — F84.0 AUTISTIC DISORDER: ICD-10-CM

## 2023-05-29 DIAGNOSIS — F63.81 INTERMITTENT EXPLOSIVE DISORDER: ICD-10-CM

## 2023-05-29 PROCEDURE — 93010 ELECTROCARDIOGRAM REPORT: CPT

## 2023-05-29 PROCEDURE — 90792 PSYCH DIAG EVAL W/MED SRVCS: CPT | Mod: 95

## 2023-05-29 NOTE — ED BEHAVIORAL HEALTH ASSESSMENT NOTE - ADDITIONAL DETAILS ALL
no history of suicidality or self harm with suicidal intent. history of self harming behavior when agitated (biting hands etc)

## 2023-05-29 NOTE — ED BEHAVIORAL HEALTH NOTE - BEHAVIORAL HEALTH NOTE
==================    PRE-HOSPITAL COURSE    ===================    SOURCE:  RN and secondhand ED documentation    DETAILS:    =========    ED COURSE    =========    SOURCE:  RN and secondhand ED documentation.    ARRIVAL:  Per chart and RN, patient arrived ______. Per RN, patient was _______ upon arrival, and ______ with triage process.    BELONGINGS:  Per RN, patient arrived with ______. All belongings were provided to hospital security, and patient currently in a gown with a 1:1 staff member.    BEHAVIOR: RN described patient to be _______, presenting with ________ thought process, (AAOx3?), presenting with ____ mood and ____ affect, remains in ______ behavioral and impulse control, is ____ violent/aggressive. RN stated that the patient is (SI/HI/A/VH?). RN stated that there ______ visible marks, bruises, or lacerations on the body. RN stated that the patient appears to be _____-groomed, maintains ____ hygiene, and reports _____ ADLs, ambulates __________ (without assistance?)    TREATMENT:  Per chart and RN, patient (did/did not) PRN medications.    VISITORS:  Per RN, ==================    PRE-HOSPITAL COURSE    ===================    SOURCE:  RN and secondhand ED documentation    DETAILS: RN    =========    ED COURSE    =========    SOURCE:  RN and secondhand ED documentation.    ARRIVAL:  Per chart and RN, patient arrived ______. Per RN, patient was _______ upon arrival, and ______ with triage process.    BELONGINGS:  Per RN, patient arrived with ______. All belongings were provided to hospital security, and patient currently in a gown with a 1:1 staff member.    BEHAVIOR: RN described patient to be _______, presenting with ________ thought process, (AAOx3?), presenting with ____ mood and ____ affect, remains in ______ behavioral and impulse control, is ____ violent/aggressive. RN stated that the patient is (SI/HI/A/VH?). RN stated that there ______ visible marks, bruises, or lacerations on the body. RN stated that the patient appears to be _____-groomed, maintains ____ hygiene, and reports _____ ADLs, ambulates __________ (without assistance?)    TREATMENT:  Per chart and RN, patient (did/did not) PRN medications.    VISITORS:  Per RN, ==================    PRE-HOSPITAL COURSE    ===================    SOURCE:  RN and secondhand ED documentation    DETAILS: RN Meli reports pt to be calm and cooperative. Pt is resting comfortably in bed and was compliant with ED care.     =========    ED COURSE    =========    SOURCE:  RN and secondhand ED documentation.    ARRIVAL:  Per chart and RN, patient arrived via EMS via group home. Per RN, patient was calm upon arrival, and cooperative with triage process.    BELONGINGS:  Per RN, patient arrived with no notable belongings. All belongings were provided to hospital security, and patient currently in a gown with a 1:1 staff member.    BEHAVIOR: RN described patient to be calm and cooperative, presenting with linear thought process, (AAOx4), presenting with euthymic mood and full affect, remains in appropriate behavioral and impulse control, is not violent/aggressive. RN stated that the patient is not actively endorsing SI/HI/A/VH. RN stated that there are no visible marks, bruises, or lacerations on the body. RN stated that the patient appears to be well-groomed, maintains good hygiene, and reports IND ADLs, ambulates without assistance.    TREATMENT:  Per chart and RN, patient did receive medications (Ativan, Imodium, IV fluids).     VISITORS:  Per RN, there is a staff member at bedside from .

## 2023-05-29 NOTE — ED BEHAVIORAL HEALTH ASSESSMENT NOTE - RISK ASSESSMENT
Patient with no history of suicidality, denies SI or PDW. He is future oriented and goal directed. Low acute risk for suicide.

## 2023-05-29 NOTE — ED BEHAVIORAL HEALTH ASSESSMENT NOTE - HPI (INCLUDE ILLNESS QUALITY, SEVERITY, DURATION, TIMING, CONTEXT, MODIFYING FACTORS, ASSOCIATED SIGNS AND SYMPTOMS)
Patient a 27 y/p male, domiciled in a group home, psychiatric hx of Intermittent explosive disorder, unspecified mood disorder, ADHD, MR, Autism, with history of self harm (biting hand) and behavioral issues, h/o multiple prior visits to ED and CPEP for episodes of agitation, and no medical issues who was BIBEMS today for concern for acute agitation as well as diarrhea.    On interview with patient, he was seen to be sleeping, was woken up. States that he was brought to the hospital because he "got aggressive." He states he does not remember exactly what happened, or why he got upset. He states, "I am feeling a lot better." States that overall he has been feeling "fine." Denied feeling down or depressed, denied any mood symptoms. States that things are "not too bad" at the home, and that he is overall well treated by staff. Reports stable sleep and appetite. Denied AVH, denied paranoid thoughts. Reported feeling safe at the home. Denied HI and denied PDW or SI. He denied side effects from medication, reports its has been working "ok" for him. Of note, patient with history of  chronic low frustration tolerance. Patient often brought to ED when acutely agitated. As previously documented, "patient does not benefit from psych admission as behavior is chronic and not expected to improve with psych admission."    Obtained collateral from  Saima. She states that patient has been in the home for sometime, but was recently moved into a new building. Reports that she has worked with patient since 2019. He has had some periods of stable behavioral symptoms, has been getting more frequently agitated over the past few months. Unclear if there are any triggers. He sees a psychiatric NP q4 weeks at the home, no recent medication changes other than change from Ativan BID to Klonopin BID. Prns are not given at the home. Earlier today patient attempted to scratch at one aide who blocked herself, he then scratched another aide on the hands and harms.

## 2023-05-29 NOTE — ED BEHAVIORAL HEALTH ASSESSMENT NOTE - SUMMARY
Patient a 27 y/p male, domiciled in a group home, psychiatric hx of Intermittent explosive disorder, unspecified mood disorder, ADHD, MR, Autism, with history of self harm (biting hand) and behavioral issues, h/o multiple prior visits to ED and CPEP for episodes of agitation, and no medical issues who was BIBEMS today for concern for acute agitation as well as diarrhea.    Patient with numerous similar ED presentations, and history of chronic poor frustration tolerance and outbursts secondary to developmental delays. No acute symptoms observed in ED. Denies AH/VH and is not currently responding to internal stimuli. Appears to be at his baseline. As noted in the past, patient does not benefit from psych admission as behavior is chronic and not expected to improve with psych admission. He may benefit from titration of his psychotropic medication and should follow-up with his psychiatric provider at the home this week. No other acute safety concerns at this time. He is stable for discharge from psychiatric perspective.

## 2023-05-29 NOTE — ED BEHAVIORAL HEALTH ASSESSMENT NOTE - SECONDARY DX1
Autism Clindamycin Counseling: I counseled the patient regarding use of clindamycin as an antibiotic for prophylactic and/or therapeutic purposes. Clindamycin is active against numerous classes of bacteria, including skin bacteria. Side effects may include nausea, diarrhea, gastrointestinal upset, rash, hives, yeast infections, and in rare cases, colitis.

## 2023-05-29 NOTE — ED BEHAVIORAL HEALTH ASSESSMENT NOTE - MEDICATIONS (PRESCRIPTIONS, DIRECTIONS)
No changes to be made. Patient on significant amount of psychiatric medication, would review history to determine which medications can be titrated for further effect

## 2023-05-29 NOTE — ED BEHAVIORAL HEALTH ASSESSMENT NOTE - DETAILS
hx of aggressive behavior/agitation when frustrated or upset. see HPI contacted  deferred no history of SI n/a

## 2023-05-29 NOTE — ED BEHAVIORAL HEALTH ASSESSMENT NOTE - CURRENT MEDICATION
Lamotrigine 200 mg BID, Benztropine 1 mg at night, Clonidine 0.2 mg bid, Thorazine 200  mg bid, Risperdal 3 mg bid, Metoprolol 25 mg 1/2 tab bid (12.5), Risperdal 0.5 mg am, Thera-m-plus 1 tab qd, Flonase one nostril qd, Klonpin 0.5mg BID

## 2023-05-29 NOTE — ED BEHAVIORAL HEALTH ASSESSMENT NOTE - DESCRIPTION
Patient requested oxygen on arrival. Otherwise calm and cooperative, sleeping. Accepted labs and vitals. No prns given. 1:1 at bedside. none Patient has been living in current group home for over the past 4 years, history of Autism. Not currently employed or in relationship.

## 2023-05-31 LAB — LAMOTRIGINE SERPL-MCNC: 10.2 UG/ML — SIGNIFICANT CHANGE UP (ref 2–20)

## 2023-06-06 ENCOUNTER — EMERGENCY (EMERGENCY)
Facility: HOSPITAL | Age: 28
LOS: 0 days | Discharge: HOME | End: 2023-06-06
Attending: EMERGENCY MEDICINE
Payer: MEDICARE

## 2023-06-06 VITALS
HEIGHT: 68 IN | HEART RATE: 110 BPM | OXYGEN SATURATION: 99 % | DIASTOLIC BLOOD PRESSURE: 74 MMHG | RESPIRATION RATE: 18 BRPM | TEMPERATURE: 99 F | SYSTOLIC BLOOD PRESSURE: 128 MMHG

## 2023-06-06 VITALS
OXYGEN SATURATION: 100 % | TEMPERATURE: 98 F | SYSTOLIC BLOOD PRESSURE: 123 MMHG | DIASTOLIC BLOOD PRESSURE: 68 MMHG | HEART RATE: 90 BPM | RESPIRATION RATE: 18 BRPM

## 2023-06-06 DIAGNOSIS — R05.9 COUGH, UNSPECIFIED: ICD-10-CM

## 2023-06-06 DIAGNOSIS — J45.909 UNSPECIFIED ASTHMA, UNCOMPLICATED: ICD-10-CM

## 2023-06-06 DIAGNOSIS — F63.81 INTERMITTENT EXPLOSIVE DISORDER: ICD-10-CM

## 2023-06-06 DIAGNOSIS — R45.1 RESTLESSNESS AND AGITATION: ICD-10-CM

## 2023-06-06 DIAGNOSIS — F90.9 ATTENTION-DEFICIT HYPERACTIVITY DISORDER, UNSPECIFIED TYPE: ICD-10-CM

## 2023-06-06 DIAGNOSIS — Z91.010 ALLERGY TO PEANUTS: ICD-10-CM

## 2023-06-06 DIAGNOSIS — Z91.018 ALLERGY TO OTHER FOODS: ICD-10-CM

## 2023-06-06 DIAGNOSIS — U07.1 COVID-19: ICD-10-CM

## 2023-06-06 DIAGNOSIS — Z91.012 ALLERGY TO EGGS: ICD-10-CM

## 2023-06-06 DIAGNOSIS — R07.89 OTHER CHEST PAIN: ICD-10-CM

## 2023-06-06 DIAGNOSIS — Z91.011 ALLERGY TO MILK PRODUCTS: ICD-10-CM

## 2023-06-06 DIAGNOSIS — F84.0 AUTISTIC DISORDER: ICD-10-CM

## 2023-06-06 DIAGNOSIS — F39 UNSPECIFIED MOOD [AFFECTIVE] DISORDER: ICD-10-CM

## 2023-06-06 LAB
ALBUMIN SERPL ELPH-MCNC: 4 G/DL — SIGNIFICANT CHANGE UP (ref 3.3–5)
ALP SERPL-CCNC: 66 U/L — SIGNIFICANT CHANGE UP (ref 40–120)
ALT FLD-CCNC: 39 U/L — SIGNIFICANT CHANGE UP (ref 12–78)
AMPHET UR-MCNC: NEGATIVE — SIGNIFICANT CHANGE UP
ANION GAP SERPL CALC-SCNC: 2 MMOL/L — LOW (ref 5–17)
APAP SERPL-MCNC: <2 UG/ML — LOW (ref 10–30)
AST SERPL-CCNC: 17 U/L — SIGNIFICANT CHANGE UP (ref 15–37)
BARBITURATES UR SCN-MCNC: NEGATIVE — SIGNIFICANT CHANGE UP
BASOPHILS # BLD AUTO: 0.03 K/UL — SIGNIFICANT CHANGE UP (ref 0–0.2)
BASOPHILS NFR BLD AUTO: 1 % — SIGNIFICANT CHANGE UP (ref 0–2)
BENZODIAZ UR-MCNC: NEGATIVE — SIGNIFICANT CHANGE UP
BILIRUB SERPL-MCNC: 0.3 MG/DL — SIGNIFICANT CHANGE UP (ref 0.2–1.2)
BUN SERPL-MCNC: 11 MG/DL — SIGNIFICANT CHANGE UP (ref 7–23)
CALCIUM SERPL-MCNC: 8.8 MG/DL — SIGNIFICANT CHANGE UP (ref 8.5–10.1)
CHLORIDE SERPL-SCNC: 111 MMOL/L — HIGH (ref 96–108)
CO2 SERPL-SCNC: 28 MMOL/L — SIGNIFICANT CHANGE UP (ref 22–31)
COCAINE METAB.OTHER UR-MCNC: NEGATIVE — SIGNIFICANT CHANGE UP
CREAT SERPL-MCNC: 1.09 MG/DL — SIGNIFICANT CHANGE UP (ref 0.5–1.3)
EGFR: 96 ML/MIN/1.73M2 — SIGNIFICANT CHANGE UP
EOSINOPHIL # BLD AUTO: 0 K/UL — SIGNIFICANT CHANGE UP (ref 0–0.5)
EOSINOPHIL NFR BLD AUTO: 0 % — SIGNIFICANT CHANGE UP (ref 0–6)
ETHANOL SERPL-MCNC: <10 MG/DL — SIGNIFICANT CHANGE UP (ref 0–10)
FLUAV AG NPH QL: SIGNIFICANT CHANGE UP
FLUBV AG NPH QL: SIGNIFICANT CHANGE UP
GLUCOSE SERPL-MCNC: 88 MG/DL — SIGNIFICANT CHANGE UP (ref 70–99)
HCT VFR BLD CALC: 39.7 % — SIGNIFICANT CHANGE UP (ref 39–50)
HGB BLD-MCNC: 12.6 G/DL — LOW (ref 13–17)
LYMPHOCYTES # BLD AUTO: 0.91 K/UL — LOW (ref 1–3.3)
LYMPHOCYTES # BLD AUTO: 32 % — SIGNIFICANT CHANGE UP (ref 13–44)
MANUAL SMEAR VERIFICATION: SIGNIFICANT CHANGE UP
MCHC RBC-ENTMCNC: 27.1 PG — SIGNIFICANT CHANGE UP (ref 27–34)
MCHC RBC-ENTMCNC: 31.7 GM/DL — LOW (ref 32–36)
MCV RBC AUTO: 85.4 FL — SIGNIFICANT CHANGE UP (ref 80–100)
METHADONE UR-MCNC: NEGATIVE — SIGNIFICANT CHANGE UP
MONOCYTES # BLD AUTO: 0.42 K/UL — SIGNIFICANT CHANGE UP (ref 0–0.9)
MONOCYTES NFR BLD AUTO: 15 % — HIGH (ref 2–14)
NEUTROPHILS # BLD AUTO: 1.47 K/UL — LOW (ref 1.8–7.4)
NEUTROPHILS NFR BLD AUTO: 52 % — SIGNIFICANT CHANGE UP (ref 43–77)
NRBC # BLD: 0 /100 — SIGNIFICANT CHANGE UP (ref 0–0)
NRBC # BLD: SIGNIFICANT CHANGE UP /100 WBCS (ref 0–0)
OPIATES UR-MCNC: NEGATIVE — SIGNIFICANT CHANGE UP
PCP SPEC-MCNC: SIGNIFICANT CHANGE UP
PCP UR-MCNC: NEGATIVE — SIGNIFICANT CHANGE UP
PLAT MORPH BLD: NORMAL — SIGNIFICANT CHANGE UP
PLATELET # BLD AUTO: 180 K/UL — SIGNIFICANT CHANGE UP (ref 150–400)
POTASSIUM SERPL-MCNC: 4.3 MMOL/L — SIGNIFICANT CHANGE UP (ref 3.5–5.3)
POTASSIUM SERPL-SCNC: 4.3 MMOL/L — SIGNIFICANT CHANGE UP (ref 3.5–5.3)
PROT SERPL-MCNC: 6.8 GM/DL — SIGNIFICANT CHANGE UP (ref 6–8.3)
RAPID RVP RESULT: DETECTED
RBC # BLD: 4.65 M/UL — SIGNIFICANT CHANGE UP (ref 4.2–5.8)
RBC # FLD: 14.2 % — SIGNIFICANT CHANGE UP (ref 10.3–14.5)
RBC BLD AUTO: NORMAL — SIGNIFICANT CHANGE UP
RSV RNA NPH QL NAA+NON-PROBE: SIGNIFICANT CHANGE UP
SALICYLATES SERPL-MCNC: <1.7 MG/DL — LOW (ref 2.8–20)
SARS-COV-2 RNA SPEC QL NAA+PROBE: DETECTED
SARS-COV-2 RNA SPEC QL NAA+PROBE: DETECTED
SODIUM SERPL-SCNC: 141 MMOL/L — SIGNIFICANT CHANGE UP (ref 135–145)
THC UR QL: NEGATIVE — SIGNIFICANT CHANGE UP
WBC # BLD: 2.83 K/UL — LOW (ref 3.8–10.5)
WBC # FLD AUTO: 2.83 K/UL — LOW (ref 3.8–10.5)

## 2023-06-06 PROCEDURE — 71045 X-RAY EXAM CHEST 1 VIEW: CPT

## 2023-06-06 PROCEDURE — 93005 ELECTROCARDIOGRAM TRACING: CPT

## 2023-06-06 PROCEDURE — 90791 PSYCH DIAGNOSTIC EVALUATION: CPT

## 2023-06-06 PROCEDURE — 80307 DRUG TEST PRSMV CHEM ANLYZR: CPT

## 2023-06-06 PROCEDURE — 99285 EMERGENCY DEPT VISIT HI MDM: CPT | Mod: 25

## 2023-06-06 PROCEDURE — 36415 COLL VENOUS BLD VENIPUNCTURE: CPT

## 2023-06-06 PROCEDURE — 80053 COMPREHEN METABOLIC PANEL: CPT

## 2023-06-06 PROCEDURE — 99285 EMERGENCY DEPT VISIT HI MDM: CPT

## 2023-06-06 PROCEDURE — 0241U: CPT

## 2023-06-06 PROCEDURE — 85025 COMPLETE CBC W/AUTO DIFF WBC: CPT

## 2023-06-06 PROCEDURE — 71045 X-RAY EXAM CHEST 1 VIEW: CPT | Mod: 26

## 2023-06-06 PROCEDURE — 0225U NFCT DS DNA&RNA 21 SARSCOV2: CPT

## 2023-06-06 PROCEDURE — 93010 ELECTROCARDIOGRAM REPORT: CPT

## 2023-06-06 RX ORDER — ACETAMINOPHEN 500 MG
650 TABLET ORAL ONCE
Refills: 0 | Status: COMPLETED | OUTPATIENT
Start: 2023-06-06 | End: 2023-06-06

## 2023-06-06 RX ADMIN — Medication 650 MILLIGRAM(S): at 11:59

## 2023-06-06 NOTE — ED BEHAVIORAL HEALTH ASSESSMENT NOTE - MODE OF ARRIVAL
Airway  Performed by: Cyndee Rodriguez CRNA  Authorized by: Jasbir Mckay MD     Final Airway Type:  Endotracheal airway  Final Endotracheal Airway*:  ETT  ETT Size (mm)*:  7.5  Cuff*:  Regular  Technique Used for Successful ETT Placement:  Video laryngoscopy  Devices/Methods Used in Placement*:  Mask  Intubation Procedure*:  Preoxygenation, ETCO2, Atraumatic, Dentition Unchanged and Pharynx Clear  Insertion Site:  Oral  Blade Type*:  Glidescope  Blade Size*:  3  Placement Verified by: auscultation and capnometry    Glottic View*:  1 - full view of glottis  Attempts*:  1   Patient Identified, Procedure confirmed, Emergency equipment available and Safety protocols followed  Location:  OR  Urgency:  Elective  Difficult Airway: No    Indications for Airway Management:  Anesthesia  Mask Difficulty Assessment:  1 - vent by mask (easy mask)  Performed By:  CRNA  CRNA:  Cyndee Rodriguez CRNA  Start Time: 3/22/2022 12:33 PM   Initially DL'ed with Miller2: G3V.  Glidescope3=G1V.  Dentition unchanged.        
EMT / Paramedic

## 2023-06-06 NOTE — ED ADULT NURSE NOTE - CHIEF COMPLAINT QUOTE
reena DE LUNA ([id number]) from [facility] for aggressive behavior. pMHx ASD. Pt. cooperative in triage. constant observation initiated.

## 2023-06-06 NOTE — ED PROVIDER NOTE - OBJECTIVE STATEMENT
28 y/o male with PMHx of MR/CP, asthma, ADHD, autism, mood disorder presents to the ED for aggressive behaviour from group home. Pt states that he was cursing and kicking, unclear about why he was doing that. Reports that he hurt his right thumb and has some chest discomfort. Also states that he has had a cough over the last couple of days and has been unable to eat anything.

## 2023-06-06 NOTE — ED BEHAVIORAL HEALTH ASSESSMENT NOTE - DETAILS
contacted  hx of aggressive behavior/agitation when frustrated or upset. see HPI no history of SI deferred n/a

## 2023-06-06 NOTE — ED ADULT TRIAGE NOTE - CHIEF COMPLAINT QUOTE
reena DE LUNA ([id number]) from [facility] for aggressive behavior. pMHx ASD. Pt. cooperative in triage. constant observation initiated.

## 2023-06-06 NOTE — ED BEHAVIORAL HEALTH ASSESSMENT NOTE - NSBHATTESTAPPBILLTIME_PSY_A_CORE
I attest my time as JACI is greater than 50% of the total combined time spent on qualifying patient care activities. I have reviewed and verified the documentation.

## 2023-06-06 NOTE — ED PROVIDER NOTE - CLINICAL SUMMARY MEDICAL DECISION MAKING FREE TEXT BOX
Pt with agitation at group home with increased frequency of agitated events. Will consult psych. Pt with agitation at group home with increased frequency of agitated events. Will consult psych.    Orlando Magaña MD 12:22 Cleared by psych

## 2023-06-06 NOTE — ED BEHAVIORAL HEALTH ASSESSMENT NOTE - HPI (INCLUDE ILLNESS QUALITY, SEVERITY, DURATION, TIMING, CONTEXT, MODIFYING FACTORS, ASSOCIATED SIGNS AND SYMPTOMS)
Patient a 27 y/p male, domiciled in a group home, psychiatric hx of Intermittent explosive disorder, unspecified mood disorder, ADHD, MR, Autism, with history of self harm (biting hand) and behavioral issues, h/o multiple prior visits to ED and CPEP for episodes of agitation, and no medical issues who was BIB EMS today for concern for acute agitation from his program at [facility]. Psychiatry consulted for evaluation.    On interview with patient he was calm and cooperative, compliant with all las, VS and medical interventions, see sitting upright in bed watching cartoons and eating a sandwich. States that he was brought to the hospital because "I was upset before but I feel better now and I would like to go home. I feel good." He states he does not remember exactly what happened, or why he got upset. He states, "I am feeling a lot better." States that overall he has been feeling "fine." Denied feeling down or depressed, denied any mood symptoms. States that things are "not too bad" at the home, and that he is overall well treated by staff. Reports stable sleep and appetite. Denied AVH, denied paranoid thoughts. Reported feeling safe at the home. Denied HI and denied PDW or SI. He denied side effects from medication, reports its has been working "ok" for him. Of note, patient with history of  chronic low frustration tolerance. Patient often brought to ED when acutely agitated. As previously documented, "patient does not benefit from psych admission as behavior is chronic and not expected to improve with psych admission."    Attempted to obtain collateral from Veronica Roque [phone] from [facility], Left  awaiting callback.    Attempted to obtain collateral from Saima [phone], supervisor from [facility], Left  awaiting callback    Spoke with Nestor Lehman [phone] from [facility], reports this behavior is not new for patient, will come  patient to bring him back to group [place]

## 2023-06-06 NOTE — ED PROVIDER NOTE - PHYSICAL EXAMINATION
GEN - NAD; well appearing; A+O x3   HEAD - NC/AT     EYES - EOMI, no conjunctival pallor, no scleral icterus  ENT -   mucous membranes  dry, no discharge      NECK - Neck supple  PULM - CTA b/l,  symmetric breath sounds  COR -  tachy, S1 S2, no murmurs  ABD - , ND, NT, soft, no guarding, no rebound, no masses    BACK - no CVA tenderness, nontender spine     EXTREMS - no edema, no deformity, warm and well perfused    SKIN - no rash or bruising      NEUROLOGIC - alert, sensation nl, motor 5/5 RUE/LUE/RLE/LLE. Garbled speech

## 2023-06-06 NOTE — ED ADULT NURSE NOTE - NSFALLUNIVINTERV_ED_ALL_ED
Bed/Stretcher in lowest position, wheels locked, appropriate side rails in place/Call bell, personal items and telephone in reach/Instruct patient to call for assistance before getting out of bed/chair/stretcher/Non-slip footwear applied when patient is off stretcher/[place] to call system/Physically safe environment - no spills, clutter or unnecessary equipment/Purposeful proactive rounding/Room/bathroom lighting operational, light cord in reach

## 2023-06-06 NOTE — ED BEHAVIORAL HEALTH ASSESSMENT NOTE - SUMMARY
Patient a 27 y/p male, domiciled in a group home, psychiatric hx of Intermittent explosive disorder, unspecified mood disorder, ADHD, MR, Autism, with history of self harm (biting hand) and behavioral issues, h/o multiple prior visits to ED and CPEP for episodes of agitation, and no medical issues who was BIB EMS today for concern for acute agitation from his program at [facility]. Psychiatry consulted for evaluation.    Patient with numerous similar ED presentations, and history of chronic poor frustration tolerance and outbursts secondary to developmental delays. No acute symptoms observed in ED. Denies AH/VH and is not currently responding to internal stimuli. Appears to be at his baseline. As noted in the past, patient does not benefit from psych admission as behavior is chronic and not expected to improve with psych admission. He may benefit from titration of his psychotropic medication and should follow-up with his psychiatric provider at the home this week. No other acute safety concerns at this time. He is stable for discharge from psychiatric perspective.

## 2023-06-06 NOTE — ED PROVIDER NOTE - PATIENT PORTAL LINK FT
You can access the FollowMyHealth Patient Portal offered by [facility] by registering at the following website: [email or web address]. By joining [employer]’s FollowMyHealth portal, you will also be able to view your health information using other applications (apps) compatible with our system.

## 2023-06-06 NOTE — ED ADULT NURSE NOTE - OBJECTIVE STATEMENT
Pt [facility] r/t aggressive behavior. Pt was cursing and kicking at group home. Pt states "I have a headache. Pt calm and co-operative in room.

## 2023-06-06 NOTE — ED BEHAVIORAL HEALTH ASSESSMENT NOTE - DESCRIPTION
Patient requested oxygen on arrival. Otherwise calm and cooperative, sleeping. Accepted labs and vitals. No prns given. 1:1 at bedside.    Vital Signs Last 24 Hrs  T(C): 37 (06 Jun 2023 11:20), Max: 37 (06 Jun 2023 11:20)  T(F): 98.6 (06 Jun 2023 11:20), Max: 98.6 (06 Jun 2023 11:20)  HR: 110 (06 Jun 2023 11:20) (110 - 110)  BP: 128/74 (06 Jun 2023 11:20) (128/74 - 128/74)  BP(mean): --  RR: 18 (06 Jun 2023 11:20) (18 - 18)  SpO2: 99% (06 Jun 2023 11:20) (99% - 99%)    Parameters below as of 06 Jun 2023 11:20  Patient On (Oxygen Delivery Method): room air none Patient has been living in current group home for over the past 4 years, history of Autism. Not currently employed or in relationship.

## 2023-06-07 NOTE — ED POST DISCHARGE NOTE - RESULT SUMMARY
+ COVID-19. [facility] will fax results to [phone]. signed Ofelia Jimenez PA-C

## 2023-06-19 DIAGNOSIS — F84.0 AUTISTIC DISORDER: ICD-10-CM

## 2023-06-19 DIAGNOSIS — Z86.69 PERSONAL HISTORY OF OTHER DISEASES OF THE NERVOUS SYSTEM AND SENSE ORGANS: ICD-10-CM

## 2023-06-19 DIAGNOSIS — F60.3 BORDERLINE PERSONALITY DISORDER: ICD-10-CM

## 2023-06-19 DIAGNOSIS — G80.9 CEREBRAL PALSY, UNSPECIFIED: ICD-10-CM

## 2023-06-19 DIAGNOSIS — J45.909 UNSPECIFIED ASTHMA, UNCOMPLICATED: ICD-10-CM

## 2023-06-21 ENCOUNTER — APPOINTMENT (OUTPATIENT)
Dept: CARDIOLOGY | Facility: CLINIC | Age: 28
End: 2023-06-21
Payer: MEDICAID

## 2023-06-21 ENCOUNTER — EMERGENCY (EMERGENCY)
Facility: HOSPITAL | Age: 28
LOS: 1 days | Discharge: HOME | End: 2023-06-21
Attending: EMERGENCY MEDICINE
Payer: MEDICAID

## 2023-06-21 VITALS
WEIGHT: 137 LBS | BODY MASS INDEX: 20.29 KG/M2 | HEIGHT: 69 IN | DIASTOLIC BLOOD PRESSURE: 75 MMHG | TEMPERATURE: 97.6 F | HEART RATE: 77 BPM | OXYGEN SATURATION: 100 % | SYSTOLIC BLOOD PRESSURE: 121 MMHG

## 2023-06-21 VITALS
DIASTOLIC BLOOD PRESSURE: 80 MMHG | OXYGEN SATURATION: 100 % | HEART RATE: 83 BPM | HEIGHT: 68 IN | TEMPERATURE: 98 F | SYSTOLIC BLOOD PRESSURE: 124 MMHG | WEIGHT: 128.97 LBS | RESPIRATION RATE: 16 BRPM

## 2023-06-21 PROCEDURE — 73130 X-RAY EXAM OF HAND: CPT

## 2023-06-21 PROCEDURE — 99283 EMERGENCY DEPT VISIT LOW MDM: CPT

## 2023-06-21 PROCEDURE — 93000 ELECTROCARDIOGRAM COMPLETE: CPT

## 2023-06-21 PROCEDURE — 73130 X-RAY EXAM OF HAND: CPT | Mod: 26,RT

## 2023-06-21 PROCEDURE — 99205 OFFICE O/P NEW HI 60 MIN: CPT

## 2023-06-21 RX ORDER — CHLORPROMAZINE HYDROCHLORIDE 100 MG/1
100 TABLET, SUGAR COATED ORAL TWICE DAILY
Refills: 0 | Status: ACTIVE | COMMUNITY
Start: 2020-02-05

## 2023-06-21 RX ORDER — RISPERIDONE 0.5 MG/1
0.5 TABLET ORAL
Refills: 0 | Status: ACTIVE | COMMUNITY

## 2023-06-21 RX ORDER — RISPERIDONE 3 MG/1
3 TABLET ORAL TWICE DAILY
Refills: 0 | Status: ACTIVE | COMMUNITY
Start: 2020-02-05

## 2023-06-21 NOTE — REASON FOR VISIT
[Initial Evaluation] : an initial evaluation of [FreeTextEntry1] : abnormal EKG. \par  [FreeTextEntry2] : out of breathe ,  elevated  blood pressure

## 2023-06-21 NOTE — CARDIOLOGY SUMMARY
[___] : [unfilled] [LVEF ___%] : LVEF [unfilled]% [Normal] : normal LA size [None] : no mitral regurgitation [de-identified] : 6/21/2023  Sinus  Rhythm \par -Left axis for age. \par \par

## 2023-06-21 NOTE — ED PROVIDER NOTE - OBJECTIVE STATEMENT
26 y/o male with PMHx of MR/CP, asthma, ADHD, autism, mood disorder pw right hand pain sp punching a mirror. was told that his insurance is not covering a holter monitor at his doctors office pt got upset and punched a mirror that broke. able to make a fist + bleeding/. Denies f/c/n/v/cp/sob/palpitations/ cough/rash/headache/dizziness/abd.pain/d/c/dysuria/hematuria

## 2023-06-21 NOTE — PHYSICAL EXAM
[General Appearance - Well Developed] : well developed [Well Groomed] : well groomed [General Appearance - Well Nourished] : well nourished [No Deformities] : no deformities [General Appearance - In No Acute Distress] : no acute distress [Normal Conjunctiva] : the conjunctiva exhibited no abnormalities [Eyelids - No Xanthelasma] : the eyelids demonstrated no xanthelasmas [Normal Oral Mucosa] : normal oral mucosa [No Oral Pallor] : no oral pallor [No Oral Cyanosis] : no oral cyanosis [Normal Jugular Venous A Waves Present] : normal jugular venous A waves present [Normal Jugular Venous V Waves Present] : normal jugular venous V waves present [No Jugular Venous Montelongo A Waves] : no jugular venous montelongo A waves [Respiration, Rhythm And Depth] : normal respiratory rhythm and effort [Exaggerated Use Of Accessory Muscles For Inspiration] : no accessory muscle use [Auscultation Breath Sounds / Voice Sounds] : lungs were clear to auscultation bilaterally [Heart Rate And Rhythm] : heart rate and rhythm were normal [Heart Sounds] : normal S1 and S2 [Murmurs] : no murmurs present [Abdomen Soft] : soft [Abdomen Tenderness] : non-tender [Abdomen Mass (___ Cm)] : no abdominal mass palpated [Abnormal Walk] : normal gait [Gait - Sufficient For Exercise Testing] : the gait was sufficient for exercise testing [Nail Clubbing] : no clubbing of the fingernails [Cyanosis, Localized] : no localized cyanosis [Petechial Hemorrhages (___cm)] : no petechial hemorrhages [Skin Color & Pigmentation] : normal skin color and pigmentation [] : no rash [No Venous Stasis] : no venous stasis [Skin Lesions] : no skin lesions [No Skin Ulcers] : no skin ulcer [No Xanthoma] : no  xanthoma was observed [Oriented To Time, Place, And Person] : oriented to person, place, and time [Affect] : the affect was normal [Mood] : the mood was normal [No Anxiety] : not feeling anxious [FreeTextEntry1] : Responding appropriately.  a little anxious. no aggressive behaviour. answers questions.

## 2023-06-21 NOTE — ED PROVIDER NOTE - PATIENT PORTAL LINK FT
You can access the FollowMyHealth Patient Portal offered by [facility] by registering at the following website: [email or web address]. By joining [employer]’s FollowMyHealth portal, you will also be able to view your health information using other applications (apps) compatible with our system.

## 2023-06-21 NOTE — DISCUSSION/SUMMARY
[Patient] : the patient [Risks] : risks [Benefits] : benefits [Alternatives] : alternatives [With PCP] : with ~his/her~ primary care provider [___ Month(s)] : in [unfilled] month(s) [EKG obtained to assist in diagnosis and management of assessed problem(s)] : EKG obtained to assist in diagnosis and management of assessed problem(s) [FreeTextEntry1] : This is a 27 year old male with autism,  asthma, bipolar, ADHD and on multiple meds here w abnormal EKG \par   \par  1) ABNORMAL EKG:   was read as RVH. repeat transthoracic echocardiogram    no cardaic symptoms. \par 2) referred for holter evalun:  unclear why he was refered for holter by er.  unclear if he had an arrhythmia during his ER visit\par will do 72 hr HOLTER patch. \par

## 2023-06-21 NOTE — HISTORY OF PRESENT ILLNESS
[FreeTextEntry1] :  Abnormal EKG. \par \par \par HPI for today: : This is a 27 year old male with   history of  autism,  lives in group home, cerebral palsy, ADHD, bipolar,. here with high blood pressure and abnoraml EKG.\par denies any symptoms.  he has been having increase aggressive behaviours and have troopuble redirecting him.  and he was taken to ER for behaviour;.  in the ER, his EKg wshowed RVH. so he was referred to cardiolgoist\par last appt with me was 3 years ago\par no chest pain./ no dyspnea on exertion . no dizzines.s nosyncope. no palpitaitnos\par he has started on clonazepam.\par  was referred to cardiology for possible holter evaluation \par \par \par \par Old note:  : feels good. denies complaints. feels good. NO ehadaches. nodizziness. no chest pain. .no dyspnea on exertio \par \par old note: This is a 24 year old male with history of  autism,  lives in group home, cerebral palsy, ADHD, bipolar,. here with high blood pressure and abnoraml EKG.\par patient comunnicated has ticks and abnormal commmunicationsl. understands questions. answers appropriately. 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 he is short of breathe.\par  he states his blood pressure is elevated. His mother had spinal surgery and hypertension \par his father: had heart problem. he drank alcochol. chest surgery. \par +_ coughing a lot. and difficulty breathing

## 2023-06-21 NOTE — ED PROVIDER NOTE - CLINICAL SUMMARY MEDICAL DECISION MAKING FREE TEXT BOX
28 y/o male with PMHx of MR/CP, asthma, ADHD, autism, mood disorder pw right hand pain sp punching a mirror. was told that his insurance is not covering a holter monitor at his doctors office pt got upset and punched a mirror that broke. able to make a fist + bleeding/. Denies f/c/n/v/cp/sob/palpitations/ cough/rash/headache/dizziness/abd.pain/d/c/dysuria/hematuria    xray of hand superficial abrasions - glass washed off- from to all digits eval for fx 26 y/o male with PMHx of MR/CP, asthma, ADHD, autism, mood disorder pw right hand pain sp punching a mirror. was told that his insurance is not covering a holter monitor at his doctors office pt got upset and punched a mirror that broke. able to make a fist + bleeding/. Denies f/c/n/v/cp/sob/palpitations/ cough/rash/headache/dizziness/abd.pain/d/c/dysuria/hematuria. tdap uptodate as per pt/aid    xray of hand superficial abrasions - glass washed off- from to all digits eval for fx

## 2023-06-21 NOTE — ED PROVIDER NOTE - PHYSICAL EXAMINATION
Head: atraumatic, normacephalic  Face: atraumatic, no crepitus no orbiral/maxillary/mandibular ttp  throat: uvula midline no exudates  eyes: perrla eomi  heart: rrr s1s2  lungs: ctab  abd: soft, nt nd +bs no rebound/guarding no cva ttp  skin: warm  LE: no swelling, no calf ttp  back: no midline cervical/thoracic/lumbar ttp  UE: neurovasculalry intact right hand multiple abrasions to digits and hand with superficial specs of glass on the hand radial pulse 2+

## 2023-06-21 NOTE — ED ADULT TRIAGE NOTE - CHIEF COMPLAINT QUOTE
pt BIBA from drs office @ baseline mental status, had outburst that his insurance would not cover halter monitor and punched a mirror. abrasions noted to right hand. wrapped by ems. bleeding controlled

## 2023-06-21 NOTE — ED PROVIDER NOTE - NSFOLLOWUPINSTRUCTIONS_ED_ALL_ED_FT
return to the ED if symptoms worsen, swelling pain fever/chills, nausea/vomiting follow up with pmd as scheduled

## 2023-06-28 ENCOUNTER — EMERGENCY (EMERGENCY)
Facility: HOSPITAL | Age: 28
LOS: 1 days | Discharge: HOME | End: 2023-06-28
Attending: EMERGENCY MEDICINE
Payer: MEDICAID

## 2023-06-28 VITALS
WEIGHT: 154.98 LBS | HEIGHT: 68 IN | RESPIRATION RATE: 20 BRPM | TEMPERATURE: 98 F | DIASTOLIC BLOOD PRESSURE: 75 MMHG | HEART RATE: 98 BPM | OXYGEN SATURATION: 98 % | SYSTOLIC BLOOD PRESSURE: 118 MMHG

## 2023-06-28 DIAGNOSIS — F84.0 AUTISTIC DISORDER: ICD-10-CM

## 2023-06-28 DIAGNOSIS — F90.8 ATTENTION-DEFICIT HYPERACTIVITY DISORDER, OTHER TYPE: ICD-10-CM

## 2023-06-28 DIAGNOSIS — F79 UNSPECIFIED INTELLECTUAL DISABILITIES: ICD-10-CM

## 2023-06-28 PROCEDURE — 72125 CT NECK SPINE W/O DYE: CPT | Mod: 26,MA

## 2023-06-28 PROCEDURE — 70450 CT HEAD/BRAIN W/O DYE: CPT | Mod: 26,MA

## 2023-06-28 PROCEDURE — 70486 CT MAXILLOFACIAL W/O DYE: CPT | Mod: 26,MA

## 2023-06-28 PROCEDURE — 72125 CT NECK SPINE W/O DYE: CPT | Mod: MA

## 2023-06-28 PROCEDURE — 12011 RPR F/E/E/N/L/M 2.5 CM/<: CPT

## 2023-06-28 PROCEDURE — 99285 EMERGENCY DEPT VISIT HI MDM: CPT | Mod: 25

## 2023-06-28 PROCEDURE — 70486 CT MAXILLOFACIAL W/O DYE: CPT | Mod: MA

## 2023-06-28 PROCEDURE — 12001 RPR S/N/AX/GEN/TRNK 2.5CM/<: CPT | Mod: XU

## 2023-06-28 PROCEDURE — 90792 PSYCH DIAG EVAL W/MED SRVCS: CPT

## 2023-06-28 PROCEDURE — 12001 RPR S/N/AX/GEN/TRNK 2.5CM/<: CPT | Mod: 59

## 2023-06-28 PROCEDURE — 70450 CT HEAD/BRAIN W/O DYE: CPT | Mod: MA

## 2023-06-28 PROCEDURE — 99284 EMERGENCY DEPT VISIT MOD MDM: CPT

## 2023-06-28 RX ORDER — LIDOCAINE HYDROCHLORIDE AND EPINEPHRINE 10; 10 MG/ML; UG/ML
10 INJECTION, SOLUTION INFILTRATION; PERINEURAL ONCE
Refills: 0 | Status: DISCONTINUED | OUTPATIENT
Start: 2023-06-28 | End: 2023-07-05

## 2023-06-28 RX ORDER — LIDOCAINE HCL 20 MG/ML
5 VIAL (ML) INJECTION ONCE
Refills: 0 | Status: DISCONTINUED | OUTPATIENT
Start: 2023-06-28 | End: 2023-07-05

## 2023-06-28 NOTE — ED ADULT NURSE NOTE - NSFALLLASTDATE_ED_ALL_ED_DT
----- Message from Faisal Malhotra MD sent at 6/27/2019  6:14 PM CDT -----  06.27.2019  THERE IS A 7 MM NON OBSTRUCTING KIDNEY STONE IN THE INFERIOR POLE OF THE RIGHT KIDNEY.  THERE IS A POSSIBLE PUNCTATE CALCULUS IN THE LOWER POLE OF THE LEFT KIDNEY.   28-Jun-2023 13:01

## 2023-06-28 NOTE — ED BEHAVIORAL HEALTH ASSESSMENT NOTE - NSSUICPROTFACT_PSY_ALL_CORE
No
Responsibility to children, family, or others/Identifies reasons for living/Supportive social network of family or friends/Positive therapeutic relationships

## 2023-06-28 NOTE — ED PROVIDER NOTE - PHYSICAL EXAMINATION
Gen: Well appearing in NAD  Head: NC, 2 cm laceration to the left lateral eyebrow, gapping with mild bleeding. + TTP of the left temporal bone.  Neck: trachea midline, no midline cervical TTP or step offs.  Resp:  No distress  Ext: no deformities, full ROM bilateral hands  Neuro:  A&O appears non focal, CN II-XII symmetrically intact.  Skin:  Warm and dry as visualized, 1 cm laceration to the right middle DIP, bleeding controlled.  Psych:  Normal affect and mood, calm, cooperative. Gen: Well appearing in NAD  Head: NC, 2 cm laceration to the left lateral eyebrow, gapping with mild bleeding. + TTP of the left temporal bone.  Neck: trachea midline, no midline cervical TTP or step offs.  Resp:  No distress  Ext: no deformities, full ROM bilateral hands  Neuro:  A&O appears non focal, CN II-XII symmetrically intact.  Skin:  Warm and dry as visualized, 2.5 cm flap-like laceration to the right middle DIP, bleeding controlled.  Psych:  Normal affect and mood, calm, cooperative.

## 2023-06-28 NOTE — ED BEHAVIORAL HEALTH ASSESSMENT NOTE - DESCRIPTION
T(C): 36.7 (06-28-23 @ 11:34), Max: 36.7 (06-28-23 @ 11:34)  T(F): 98 (06-28-23 @ 11:34), Max: 98 (06-28-23 @ 11:34)  HR: 98 (06-28-23 @ 11:34) (98 - 98)  BP: 118/75 (06-28-23 @ 11:34) (118/75 - 118/75)  RR: 20 (06-28-23 @ 11:34) (20 - 20)  SpO2: 98% (06-28-23 @ 11:34) (98% - 98%) Patient has been living in current group home for over the past 4 years, history of Autism. Not currently employed or in relationship. none

## 2023-06-28 NOTE — ED PROVIDER NOTE - PATIENT PORTAL LINK FT
You can access the FollowMyHealth Patient Portal offered by [facility] by registering at the following website: [email or web address]. By joining [facility]’s FollowMyHealth portal, you will also be able to view your health information using other applications (apps) compatible with our system.

## 2023-06-28 NOTE — ED PROCEDURE NOTE - FINGER LOCATION
DIP/middle Dupixent Counseling: I discussed with the patient the risks of dupilumab including but not limited to eye infection and irritation, cold sores, injection site reactions, worsening of asthma, allergic reactions and increased risk of parasitic infection.  Live vaccines should be avoided while taking dupilumab. Dupilumab will also interact with certain medications such as warfarin and cyclosporine. The patient understands that monitoring is required and they must alert us or the primary physician if symptoms of infection or other concerning signs are noted.

## 2023-06-28 NOTE — ED PROVIDER NOTE - NS ED ROS FT
Const: Denies fever, chills  HEENT: Denies blurry vision, sore throat  Neck: Denies neck pain/stiffness  Resp: Denies coughing, SOB  Cardiovascular: Denies CP, palpitations, LE edema  GI: Denies nausea, vomiting, abdominal pain, diarrhea, constipation, blood in stool  : Denies urinary frequency/urgency/dysuria, hematuria  MSK: Denies back pain  Neuro:   + Headache, + dizziness. Denies  numbness, weakness  Skin:    + Finger laceration. Denies rashes.

## 2023-06-28 NOTE — ED PROVIDER NOTE - NSFOLLOWUPINSTRUCTIONS_ED_ALL_ED_FT
- Keep your stitches dry as much as possible.  - Your stitches need to come out in 7-10 days! Return to the emergency department or follow up with your primary doctor to have them taken out.  - Return to the ED if you are having white discharge, swelling or redness around the laceration, if you have fevers or chills.  - It is normal to have some pain at the area - take tylenol as needed for pain. - Keep your stitches dry as much as possible.  - Your stitches need to come out in 7-10 days! Return to the emergency department or follow up with your primary doctor to have them taken out.  - Return to the ED if you are having white discharge, swelling or redness around the laceration, if you have fevers or chills.  - It is normal to have some pain at the area - take tylenol as needed for pain.  - Return to the ED if you have headache, change in mental status, vomiting, vision changes or any new or concerning symptoms.

## 2023-06-28 NOTE — ED BEHAVIORAL HEALTH ASSESSMENT NOTE - CURRENT PLAN:
Goal Outcome Evaluation:  Plan of Care Reviewed With: patient        Progress: improving  Outcome Summary: 70 yo female POD 2 L Hip I&D. VSS. NVI. AXO X4. PT on room air. pain controlled with prn po meds. hemovac in place, minimal output on shift. dressing cdi. x1 asssit with walker. educated pt on safety. will continue to monitor.   None known

## 2023-06-28 NOTE — ED BEHAVIORAL HEALTH ASSESSMENT NOTE - DETAILS
hx of aggressive behavior/agitation when frustrated or upset. see HPI na deferred no history of SI Pt will return to ED if SI or feels unsafe

## 2023-06-28 NOTE — ED ADULT TRIAGE NOTE - CHIEF COMPLAINT QUOTE
RADHA from a group home s- s/o intentional fall hitting his head on the ground. + LAC to the L above eye area. Bleeding controlled . As per staff pt needs psych  eval. Denies SI/HI

## 2023-06-28 NOTE — ED BEHAVIORAL HEALTH ASSESSMENT NOTE - HPI (INCLUDE ILLNESS QUALITY, SEVERITY, DURATION, TIMING, CONTEXT, MODIFYING FACTORS, ASSOCIATED SIGNS AND SYMPTOMS)
Patient a 27 y/p male, domiciled in a group home, psychiatric hx of Intermittent explosive disorder, unspecified mood disorder, ADHD, MR, Autism, with history of self harm (biting hand) and behavioral issues, h/o multiple prior visits to ED and CPEP for episodes of agitation, last time early June and no medical issues who was BIB EMS today  s/p agitation and self harm by banging head on floor.  Referred to psych by request of group homer.     Pt calm and cooperative, limited to poor eye contact, reports he became upset and aggression secondary to poor frustration tolerance and limited coping skills.  Pt reports he is trying to be better and using some skills, like going to his room.  Pt denies SIIP, HIIP, AH/VH.  Pt is having chronic behavioral episodes of agitation which may not improve and is followed by out Pt provider to optimize behavior.  Collateral reports Pt has been very good lately until today and is triggered by certain employees from residence.

## 2023-06-28 NOTE — ED ADULT NURSE NOTE - NSFALLRISKINTERV_ED_ALL_ED
Assistance OOB with selected safe patient handling equipment if applicable/Communicate fall risk and risk factors to all staff, patient, and family/Monitor for mental status changes and reorient to person, place, and time, as needed/Move patient closer to nursing station/within visual sight of ED staff/Provide visual cue: yellow wristband, yellow gown, etc/Reinforce activity limits and safety measures with patient and family/Toileting schedule using arm’s reach rule for commode and bathroom/Use of alarms - bed, stretcher, chair and/or video monitoring/Call bell, personal items and telephone in reach/Instruct patient to call for assistance before getting out of bed/chair/stretcher/Non-slip footwear applied when patient is off stretcher/[place] to call system/Physically safe environment - no spills, clutter or unnecessary equipment/Purposeful Proactive Rounding/Room/bathroom lighting operational, light cord in reach

## 2023-06-28 NOTE — ED ADULT NURSE NOTE - NS ED NURSE LEVEL OF CONSCIOUSNESS SPEECH
Occupational Therapy Discharge Summary     Visit Count: 13  Plan of Care Dates: Initial: 11/20/2017 Through: 3/1/2018  Insurance Information:  PAYOR: [employer]  VISIT LIMIT: 60 VISITS PER CALENDAR YEAR COMBINED PT/OT/ST  AUTHORIZATION NEEDED:  YES FOR CHILDREN 16 AND UNDER AND FOR ALL SPEECH AFTER EVAL   /  FOR AGES 17 AND UP- AUTH NEEDED  AFTER 18 VISITS    Next Referring Provider Visit: as needed    Referred by: Maynor Singh, *  Medical Diagnosis (from order): M25.612 Stiffness of left shoulder joint   Insurance: 1. [employer]  2. N/A    Date of Surgery: 11/2/17; Surgery performed: L shoulder open [place]; Physician Guidelines: yes     Diagnosis Precautions: no forceful pushing or pulling, avoid aggressive ER and Ext (ER to 45 degrees)  Chart reviewed: Relevant co-morbidities, allergies, tests and medications: no     SUBJECTIVE   Patient is 13 weeks post op.  Patient states his ER remains stiff.    Current Pain: 0/10; can increase slightly after his stretches  Functional Change: using the LUE for most tasks as tolerated; avoid contact sports until further approval from Dr. Singh    OBJECTIVE   Hand Dominance: right     Posture/Observation:  Arm at side with natural arm swing     Range of Motion (degrees)  [x] All motions within functional/normal limits except those noted.   [x] Only those motions that were assessed are noted.  [x] Uninvolved extremity motion within functional/normal limits.    Norm Left left Right   Shoulder                    Date   Initial  PROM 1/29/18  A/P Initial   Flexion 170-180 110* 160/160 WNL   Extension 50-60 nt  WNL   Abduction 170-180 nt scaption  160 WNL   Adduction 50-75 10* 35 WNL   Internal Rotation   Buttocks* Small of back WNL   External  Rotation 80-90 20* 60/65 WNL   standard testing positions unless otherwise noted; Key: ranges are reported in active range of motion unless noted as AA=active assistive or P=passive range of  motion, * denotes pain   Comments:  External rotation is improving.     Scapular Assessment    Left Right   Resting Position elevated, anterior tilt normal   Scapulohumeral Rhythm nt within normal limits   Comments: ; * denotes pain      Strength (out of 5)  [] Gross muscle strength within functional/normal limits except as noted.  [] Only muscle strength that was assessed are noted.  [x] Uninvolved muscle strength within functional/normal limits.    Left Right   Date 1/29 1/29   Shoulder Flexion  5/5 5/5    Shoulder Extension  5/5 5/5    Shoulder Abduction 5/5  4+/5    Shoulder Adduction 5/5  5/5    Shoulder Internal Rotation 5/5  5/5    Shoulder External Rotation 5/5   5/5   Elbow Flexion 5/5  5/5    Elbow Extension 5/5  5/5    standard testing positions unless otherwise noted,* denotes pain  Comments: tolerating light band exercises      Palpation:  Tender over anterior shoulder and incision site     Scar/Incision:  tolerates scar massage     Outcome Measures:   Disabilities of the Arm, Shoulder and Hand (DASH): DASH Score Calculated: 17.5   (scored 0-100; a higher score indicates greater disability)   1/29/18:  6.67; improving    Treatment   Therapeutic Exercise:   pec stretches at 3 different levels  Wall stabilization circles with ball x10 in each direction  Rolling ball up wall for elevation stretch; also at angles  Wall push ups x15   green T-band exercises:  ER x20  IR x20  Flexion x20   progress to silver band for:  Rowing x20  Lat pull downs x20    Cable system  Diagonals with 24# x 10 each direction  Bicep curls with 48# x 20  Triceps press with 96# x 20  Simulated baseball throw with 24# x 20  Reverse baseball throw with 24# x 20    Prone horizontal abduction without weight x 10  Prone horizontal abduction/scaption without weight x 10  Prone rows with 5# x 10-15    Weight machines:  Rowing with 70# x 20  Chest press with 50# x 20  Shoulder press with 50# x 20  Anterior shoulder press with 50# x 20  Rear  deltoid press with 30# x 20  Lat pulldowns with 50# x 20    Manual Therapy:   Scar massage  PROM in supine into elevation and ER    Current Home Program (not performed this date except as noted above):   4 quadrant stretches  pec stretches at 3 different levels  Ball rotation around waist for IR  Active ER in standing with arms at side x 10  Wall stabilization circles with ball x10 in each direction  Wall push ups x15  red T-band exercises: 15-20 reps each set, 1-2 sets per day as tolerated; progress to green as tolerated  ER x20  IR x20  Flexion x20  Blue band for:  Rowing x20  Lat pull downs x20  Diagonals with green band  Prone horizontal abduction without weight x 10  Prone horizontal abduction/scaption without weight x 10  Prone rows with 5# x 10-15    ASSESSMENT   13 weeks post op; To date the patient has made gains in AROM and progressing to strengthening.      Pain after treatment: 1/10.    Compliant with therapy visits and home exercise program: Yes  Progress toward discharge/long term goals: good progress    Discharge from skilled therapy with instructions/recommendations: continue with his home exercise program   Result of above outlined education: Verbalizes understanding and Demonstrates understanding    Discharge Measures:   Total Number of Visits: 13  Treatment Category: Shoulder, Other Surgical  Outcome Measure:  Disabilities of the Arm, Shoulder, and Hand   Initial Outcome Score:  18   Discharge Score Error: None  Discharge Outcome Score: 7  Primary Clinician: Heather Alvarado  Subjective Percent Improvement With Therapy: Not Assessed      Goals:       To be obtained by end of this plan of care:  1. Patient independent with modified and progressed home exercise program.(goal met)  2. Patient will decrease involved shoulder pain/symptoms to 0/10  to aid in normalization of upper extremity movements to aid activities of independent daily living. (progressing toward goal)  3. Patient will increase involved  shoulder active range of motion to WFL to aid in tolerating repetitive overhead/upper extremity activity. (goal met)  4. Patient will increase involved shoulder strength to 5/5 to aid in completion of self-care tasks/dressing.(progressing toward goal)  5. Patient will be able to reach behind back without  pain/difficulty to improve function in dressing. (goal met)  6. Patient will be able to reach over head without  pain/difficulty to improve function in cooking, reaching into cupboard, grooming.(goal met)  7. Patient will be able to sleep 6 hours without disruption from pain. (goal met)  8. DASH: Patient will complete form to reflect an improved score from initial score of 17.5 to less than or equal to 2.5 (scored 0-100; a higher score indicates greater disability) to indicate pt reported improvement in function/disability/impairment (minimal detectable change: 15 points).(progressing toward goal)           PLAN   Discharge to home program    THERAPY DAILY BILLING   Primary Insurance:  [employer]  Secondary Insurance: N/A    Evaluation Procedures:  No evaluation codes were used on this date of service    Timed Procedures:  Manual Therapy, 15 minutes  Therapeutic Exercise, 15 minutes    Untimed Procedures:  No untimed codes were used on this date of service    Total Treatment Time: 30 minutes        Speaking Coherently

## 2023-06-28 NOTE — ED PROVIDER NOTE - CLINICAL SUMMARY MEDICAL DECISION MAKING FREE TEXT BOX
27-year-old male with past medical history CP/MR/autism/explosive personality disorder presents after intentionally hitting his head on the floor, no LOC.  Neuro intact.  Patient sustained laceration to left lateral eyebrow as well as right medial DIP on the third finger.  Neurovascularly intact.  Will CT head, C-spine, max face due to patient's symptoms of headache and lightheadedness since the incident.  Tetanus up-to-date.  Will repair laceration.

## 2023-06-28 NOTE — ED ADULT NURSE NOTE - OBJECTIVE STATEMENT
Pt with intellectual disability presents to ED s/p mechanical slip and fall earlier today. Pt states that he remembers falling, and states that he slipped on a phone, causing his head to hit the floor. Aide of [facility] is at the bedside, who did not witness the fall.

## 2023-06-28 NOTE — ED BEHAVIORAL HEALTH ASSESSMENT NOTE - SUMMARY
Patient a 27 y/p male, domiciled in a group home, psychiatric hx of Intermittent explosive disorder, unspecified mood disorder, ADHD, MR, Autism, with history of self harm (biting hand) and behavioral issues, h/o multiple prior visits to ED and CPEP for episodes of agitation, last time early June and no medical issues who was BIB EMS today  s/p agitation and self harm by banging head on floor.  Referred to psych by request of group homer.     Pt calm and cooperative, limited to poor eye contact, reports he became upset and aggression secondary to poor frustration tolerance and limited coping skills.  Pt reports he is trying to be better and using some skills, like going to his room.  Pt denies SIIP, HIIP, AH/VH.  Pt is having chronic behavioral episodes of agitation which may not improve and is followed by out Pt provider to optimize behavior.  Collateral reports Pt has been very good lately until today and is triggered by certain employees from residence.  Pt cleared for discharge back to out pt provider

## 2023-06-28 NOTE — ED BEHAVIORAL HEALTH ASSESSMENT NOTE - OTHER PAST PSYCHIATRIC HISTORY (INCLUDE DETAILS REGARDING ONSET, COURSE OF ILLNESS, INPATIENT/OUTPATIENT TREATMENT)
Currently being treated by psychiatric NP at senior care. No history of inpatient admissions. See HPI.

## 2023-06-28 NOTE — ED PROVIDER NOTE - OBJECTIVE STATEMENT
27-year-old male with past medical history CP/MR/autism/explosive personality disorder, Parkinson's disease presents with aide from [facility] for evaluation after he intentionally hit his face on the ground, sustaining a laceration to his left lateral eyebrow.  He denies loss of consciousness.  He states he has been lightheaded and dizzy since the incident.  He also complains of a laceration on his right middle DIP that he sustained after "throwing and breaking things".  He states he was trying to hit his aide and other staff.  He is calm and cooperative at this time.  It confirms that his last tetanus shot was within the last 10 years.  Review of chart shows that patient has been here for multiple times for similar lacerations.

## 2023-07-06 ENCOUNTER — EMERGENCY (EMERGENCY)
Facility: HOSPITAL | Age: 28
LOS: 1 days | Discharge: HOME | End: 2023-07-06
Attending: STUDENT IN AN ORGANIZED HEALTH CARE EDUCATION/TRAINING PROGRAM
Payer: MEDICAID

## 2023-07-06 VITALS
HEIGHT: 68 IN | HEART RATE: 106 BPM | SYSTOLIC BLOOD PRESSURE: 104 MMHG | WEIGHT: 160.06 LBS | DIASTOLIC BLOOD PRESSURE: 57 MMHG | TEMPERATURE: 98 F | RESPIRATION RATE: 16 BRPM | OXYGEN SATURATION: 97 %

## 2023-07-06 VITALS
TEMPERATURE: 99 F | RESPIRATION RATE: 18 BRPM | OXYGEN SATURATION: 98 % | HEART RATE: 86 BPM | SYSTOLIC BLOOD PRESSURE: 124 MMHG | DIASTOLIC BLOOD PRESSURE: 71 MMHG

## 2023-07-06 DIAGNOSIS — F90.1 ATTENTION-DEFICIT HYPERACTIVITY DISORDER, PREDOMINANTLY HYPERACTIVE TYPE: ICD-10-CM

## 2023-07-06 DIAGNOSIS — F84.0 AUTISTIC DISORDER: ICD-10-CM

## 2023-07-06 LAB
ALBUMIN SERPL ELPH-MCNC: 4.6 G/DL — SIGNIFICANT CHANGE UP (ref 3.3–5.2)
ALP SERPL-CCNC: 60 U/L — SIGNIFICANT CHANGE UP (ref 40–120)
ALT FLD-CCNC: 16 U/L — SIGNIFICANT CHANGE UP
ANION GAP SERPL CALC-SCNC: 14 MMOL/L — SIGNIFICANT CHANGE UP (ref 5–17)
AST SERPL-CCNC: 21 U/L — SIGNIFICANT CHANGE UP
BASOPHILS # BLD AUTO: 0.03 K/UL — SIGNIFICANT CHANGE UP (ref 0–0.2)
BASOPHILS NFR BLD AUTO: 0.9 % — SIGNIFICANT CHANGE UP (ref 0–2)
BILIRUB SERPL-MCNC: 0.5 MG/DL — SIGNIFICANT CHANGE UP (ref 0.4–2)
BUN SERPL-MCNC: 14.6 MG/DL — SIGNIFICANT CHANGE UP (ref 8–20)
CALCIUM SERPL-MCNC: 9.4 MG/DL — SIGNIFICANT CHANGE UP (ref 8.4–10.5)
CHLORIDE SERPL-SCNC: 102 MMOL/L — SIGNIFICANT CHANGE UP (ref 96–108)
CO2 SERPL-SCNC: 25 MMOL/L — SIGNIFICANT CHANGE UP (ref 22–29)
CREAT SERPL-MCNC: 0.92 MG/DL — SIGNIFICANT CHANGE UP (ref 0.5–1.3)
D DIMER BLD IA.RAPID-MCNC: <150 NG/ML DDU — SIGNIFICANT CHANGE UP
EGFR: 118 ML/MIN/1.73M2 — SIGNIFICANT CHANGE UP
EOSINOPHIL # BLD AUTO: 0.08 K/UL — SIGNIFICANT CHANGE UP (ref 0–0.5)
EOSINOPHIL NFR BLD AUTO: 2.9 % — SIGNIFICANT CHANGE UP (ref 0–6)
GIANT PLATELETS BLD QL SMEAR: PRESENT — SIGNIFICANT CHANGE UP
GLUCOSE SERPL-MCNC: 78 MG/DL — SIGNIFICANT CHANGE UP (ref 70–99)
HCT VFR BLD CALC: 40.3 % — SIGNIFICANT CHANGE UP (ref 39–50)
HGB BLD-MCNC: 13.1 G/DL — SIGNIFICANT CHANGE UP (ref 13–17)
LYMPHOCYTES # BLD AUTO: 0.64 K/UL — LOW (ref 1–3.3)
LYMPHOCYTES # BLD AUTO: 22.1 % — SIGNIFICANT CHANGE UP (ref 13–44)
MANUAL SMEAR VERIFICATION: SIGNIFICANT CHANGE UP
MCHC RBC-ENTMCNC: 27.5 PG — SIGNIFICANT CHANGE UP (ref 27–34)
MCHC RBC-ENTMCNC: 32.5 GM/DL — SIGNIFICANT CHANGE UP (ref 32–36)
MCV RBC AUTO: 84.7 FL — SIGNIFICANT CHANGE UP (ref 80–100)
MONOCYTES # BLD AUTO: 0.5 K/UL — SIGNIFICANT CHANGE UP (ref 0–0.9)
MONOCYTES NFR BLD AUTO: 17.3 % — HIGH (ref 2–14)
NEUTROPHILS # BLD AUTO: 1.62 K/UL — LOW (ref 1.8–7.4)
NEUTROPHILS NFR BLD AUTO: 55.8 % — SIGNIFICANT CHANGE UP (ref 43–77)
PLAT MORPH BLD: NORMAL — SIGNIFICANT CHANGE UP
PLATELET # BLD AUTO: 202 K/UL — SIGNIFICANT CHANGE UP (ref 150–400)
POTASSIUM SERPL-MCNC: 4.7 MMOL/L — SIGNIFICANT CHANGE UP (ref 3.5–5.3)
POTASSIUM SERPL-SCNC: 4.7 MMOL/L — SIGNIFICANT CHANGE UP (ref 3.5–5.3)
PROT SERPL-MCNC: 6.2 G/DL — LOW (ref 6.6–8.7)
RBC # BLD: 4.76 M/UL — SIGNIFICANT CHANGE UP (ref 4.2–5.8)
RBC # FLD: 13.8 % — SIGNIFICANT CHANGE UP (ref 10.3–14.5)
RBC BLD AUTO: NORMAL — SIGNIFICANT CHANGE UP
SMUDGE CELLS # BLD: PRESENT — SIGNIFICANT CHANGE UP
SODIUM SERPL-SCNC: 141 MMOL/L — SIGNIFICANT CHANGE UP (ref 135–145)
TROPONIN T SERPL-MCNC: <0.01 NG/ML — SIGNIFICANT CHANGE UP (ref 0–0.06)
VARIANT LYMPHS # BLD: 1 % — SIGNIFICANT CHANGE UP (ref 0–6)
WBC # BLD: 2.9 K/UL — LOW (ref 3.8–10.5)
WBC # FLD AUTO: 2.9 K/UL — LOW (ref 3.8–10.5)

## 2023-07-06 PROCEDURE — 71045 X-RAY EXAM CHEST 1 VIEW: CPT | Mod: 26

## 2023-07-06 PROCEDURE — 99285 EMERGENCY DEPT VISIT HI MDM: CPT | Mod: 25

## 2023-07-06 PROCEDURE — 93005 ELECTROCARDIOGRAM TRACING: CPT

## 2023-07-06 PROCEDURE — 36415 COLL VENOUS BLD VENIPUNCTURE: CPT

## 2023-07-06 PROCEDURE — 93010 ELECTROCARDIOGRAM REPORT: CPT

## 2023-07-06 PROCEDURE — 80053 COMPREHEN METABOLIC PANEL: CPT

## 2023-07-06 PROCEDURE — 99285 EMERGENCY DEPT VISIT HI MDM: CPT

## 2023-07-06 PROCEDURE — 85379 FIBRIN DEGRADATION QUANT: CPT

## 2023-07-06 PROCEDURE — 84484 ASSAY OF TROPONIN QUANT: CPT

## 2023-07-06 PROCEDURE — 90792 PSYCH DIAG EVAL W/MED SRVCS: CPT

## 2023-07-06 PROCEDURE — 85025 COMPLETE CBC W/AUTO DIFF WBC: CPT

## 2023-07-06 PROCEDURE — 71045 X-RAY EXAM CHEST 1 VIEW: CPT

## 2023-07-06 PROCEDURE — 96374 THER/PROPH/DIAG INJ IV PUSH: CPT

## 2023-07-06 RX ORDER — KETOROLAC TROMETHAMINE 30 MG/ML
15 SYRINGE (ML) INJECTION ONCE
Refills: 0 | Status: DISCONTINUED | OUTPATIENT
Start: 2023-07-06 | End: 2023-07-06

## 2023-07-06 RX ORDER — SODIUM CHLORIDE 9 MG/ML
1000 INJECTION INTRAMUSCULAR; INTRAVENOUS; SUBCUTANEOUS ONCE
Refills: 0 | Status: COMPLETED | OUTPATIENT
Start: 2023-07-06 | End: 2023-07-06

## 2023-07-06 RX ADMIN — Medication 15 MILLIGRAM(S): at 13:02

## 2023-07-06 RX ADMIN — SODIUM CHLORIDE 1000 MILLILITER(S): 9 INJECTION INTRAMUSCULAR; INTRAVENOUS; SUBCUTANEOUS at 12:10

## 2023-07-06 NOTE — ED BEHAVIORAL HEALTH ASSESSMENT NOTE - CURRENT MEDICATION
Lamotrigine 200 mg BID, Benztropine 1 mg at night, Clonidine 0.2 mg bid, Thorazine 200  mg bid, Risperdal 3 mg bid, Metoprolol 25 mg 1/2 tab bid (12.5), Risperdal 0.5 mg am, Thera-m-plus 1 tab qd, Flonase one nostril qd, Klonopin 0.5mg BID

## 2023-07-06 NOTE — ED BEHAVIORAL HEALTH ASSESSMENT NOTE - SUMMARY
Patient a 27 y/p male, domiciled in a group home, psychiatric hx of Intermittent explosive disorder, unspecified mood disorder, ADHD, MR, Autism, with history of self harm (biting hand) and chronic behavioral issues, h/o multiple prior visits to ED last 6/28/23 at [facility], also at [facility] for episodes of agitation,   no acute medical issues who was BIB EMS today  s/p agitation threatening staff and destruction of a TV in residence.  Referred to psych by request of group staff.    Pt presents calm, cooperative, sheepish, childlike, inappropriate smiling, reports he was upset because he wanted more money to buy food.  He was told he does not need money for food as it is provided.  Pt became angry and threatened staff per residence and broke their TV.  Pt did not require medication for agitation in ED.  Asking for food.  Pt states he feels bad.  He denies wanting to hurt himself or others.  He is aware of his behavioral outburst as something he has to work on.   Behavior is chronic however due to poor coping and frustration tolerance.  Pt informs me he has to buy a new TV for residence.        No acute psych condition which will benefit by psych admission.  T&R

## 2023-07-06 NOTE — ED ADULT NURSE REASSESSMENT NOTE - NS ED NURSE REASSESS COMMENT FT1
assumed care of pt from RN CF, pt AAOX3, resp. even and unlabored on RA, pt DC'd PT/rn arrival, pt resting comfortably in bed, pt waiting for transport back to group home, pt denies pain/offers no complaints at this time, 1:1 DC'd, safety maintained

## 2023-07-06 NOTE — ED BEHAVIORAL HEALTH ASSESSMENT NOTE - DESCRIPTION
T(C): 37.2 (07-06-23 @ 12:01), Max: 37.2 (07-06-23 @ 12:01)  T(F): 98.9 (07-06-23 @ 12:01), Max: 98.9 (07-06-23 @ 12:01)  HR: 106 (07-06-23 @ 11:38) (106 - 106)  BP: 104/57 (07-06-23 @ 11:38) (104/57 - 104/57)  RR: 16 (07-06-23 @ 11:38) (16 - 16)  SpO2: 97% (07-06-23 @ 11:38) (97% - 97%) none Patient has been living in current group home for over the past 4 years, history of Autism. Not currently employed or in relationship.

## 2023-07-06 NOTE — ED ADULT TRIAGE NOTE - CHIEF COMPLAINT QUOTE
pt BIBA from group home after verbal outburst. pt states house was too hot so he became annoyed and started to take his clothes off. pt @ baseline mental status. changed into yellow gown and belongings secured

## 2023-07-06 NOTE — ED BEHAVIORAL HEALTH ASSESSMENT NOTE - HPI (INCLUDE ILLNESS QUALITY, SEVERITY, DURATION, TIMING, CONTEXT, MODIFYING FACTORS, ASSOCIATED SIGNS AND SYMPTOMS)
Patient a 27 y/p male, domiciled in a group home, psychiatric hx of Intermittent explosive disorder, unspecified mood disorder, ADHD, MR, Autism, with history of self harm (biting hand) and chronic behavioral issues, h/o multiple prior visits to ED last 6/28/23 at [facility], also at [facility] for episodes of agitation,   no acute medical issues who was BIB EMS today  s/p agitation threatening staff and destruction of a TV in residence.  Referred to psych by request of group staff.    Pt presents calm, cooperative, sheepish, childlike, inappropriate smiling, reports he was upset because he wanted more money to buy food.  He was told he does not need money for food as it is provided.  Pt became angry and threatened staff per residence and broke their TV.  Pt did not require medication for agitation in ED.  Asking for food.  Pt states he feels bad.  He denies wanting to hurt himself or others.  He is aware of his behavioral outburst as something he has to work on.  Unclear if seen by out pt provider since last ED eval for meds modification.  Behavior is chronic however due to poor coping and frustration tolerance.  Pt informs me he has to buy a new TV for residence.  Pt wants to return to residence as he does not like it here in ED.  Left message for Phyllis, manager at [facility], [phone] for return call and to notify of dispo.  No acute psych condition which will benefit by psych admission.

## 2023-07-06 NOTE — ED PROVIDER NOTE - PATIENT PORTAL LINK FT
You can access the FollowMyHealth Patient Portal offered by [facility] by registering at the following website: [email or web address]. By joining [employer]’s FollowMyHealth portal, you will also be able to view your health information using other applications (apps) compatible with our system.

## 2023-07-06 NOTE — ED PROVIDER NOTE - ATTENDING CONTRIBUTION TO CARE
26yo male domiciled in a group home, psychiatric hx of Intermittent explosive disorder, unspecified mood disorder, ADHD, MR, Autism presents for feeling hot and chest pain after running around. Pt low risk PE, neg ddimer, low risk MACE, well appearing, asymptomatic now, when Group Home called by ESTEBAN stating requiring psych eval, pt evaluated by Ernie BOSTON, cleared for dc with follow up

## 2023-07-06 NOTE — ED BEHAVIORAL HEALTH ASSESSMENT NOTE - DETAILS
deferred no history of SI left message denies SI/HI hx of aggressive behavior/agitation when frustrated or upset. see HPI
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
## 2023-07-06 NOTE — ED PROVIDER NOTE - CLINICAL SUMMARY MEDICAL DECISION MAKING FREE TEXT BOX
28 y/o male, domiciled in a group home, psychiatric hx of Intermittent explosive disorder, unspecified mood disorder, ADHD, MR, Autism   Brought in by EMS after an outburst at his group home.  In the ED reporting chest pain and shortness of breath.  Labs, EKG, troponin, D-dimer, chest x-ray ordered. 28 y/o male, domiciled in a group home, psychiatric hx of Intermittent explosive disorder, unspecified mood disorder, ADHD, MR, Autism   Brought in by EMS after an outburst at his group home.  In the ED reporting chest pain and shortness of breath.  Labs, EKG, troponin, D-dimer, chest x-ray ordered. Workup is unremarkable for any emergent process.   Patient is now feeling improved and wishes to leave.  Patient / family members have capacity.    Patient/family was given full return precautions, counseled on red flag symptoms such as LOC, fever, severe pain, or focal deficits and advised to return to the ED for these reasons or any reason that was concerning to them. Patient/family was informed of all significant and incidental findings found on this workup today and all results were reviewed.   All questions were answered, advised to make close follow up with their primary care provider and specialty clinics (as applicable) to follow up with this visit and continue investigation/treatment.   Patient/family has shown adequate understanding and is agreeable to the plan.
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
## 2023-07-06 NOTE — ED PROVIDER NOTE - PHYSICAL EXAMINATION
Gen: well appearing, no acute distress, fidgeting in bed.  Head: NC/AT  Neck: trachea midline  Card: regular rate and rhythm  Resp:  CTAB  Abd: soft, non-distended, non-tender  Ext: no deformities above reported baseline  Neuro:  A&O, no motor or sensory deficits above reported baseline  Skin:  Warm and dry as visualized  Psych:  Normal affect and mood
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
## 2023-07-06 NOTE — ED ADULT NURSE REASSESSMENT NOTE - NS ED NURSE REASSESS COMMENT FT1
Pt awake, yellow gown in place, respirations even and unlabored on room air, 1:1 at bedside, gave report to EMS pending transport back to group home at 1700 as per . No distress noted.

## 2023-07-06 NOTE — ED PROVIDER NOTE - OBJECTIVE STATEMENT
28 y/o male, domiciled in a group home, psychiatric hx of Intermittent explosive disorder, unspecified mood disorder, ADHD, MR, Autism, Brought in by EMS after an outburst because his room was too hot.  Up in the ED patient reports he has pain all over, worse in his chest.  Patient states that he started having difficulty breathing after running around the group home.  He says that he needs many tests to be done.  He denies coughing, back pain, numbness, tingling, focal weakness, headache, abdominal pain, nausea, vomiting, diarrhea, or any other complaints.
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
## 2023-07-26 NOTE — ED BEHAVIORAL HEALTH ASSESSMENT NOTE - AFFECT RANGE
Modified Advancement Flap Text: The defect edges were debeveled with a #15 scalpel blade.  Given the location of the defect, shape of the defect and the proximity to free margins a modified advancement flap was deemed most appropriate.  Using a sterile surgical marker, an appropriate advancement flap was drawn incorporating the defect and placing the expected incisions within the relaxed skin tension lines where possible.    The area thus outlined was incised deep to adipose tissue with a #15 scalpel blade.  The skin margins were undermined to an appropriate distance in all directions utilizing iris scissors. Constricted

## 2023-08-02 NOTE — ED BEHAVIORAL HEALTH ASSESSMENT NOTE - NSBHMSETHTASSOC_PSY_A_CORE
Anesthesia Post-op Note    Patient: Zaira Cadet  Procedure(s) Performed: LEFT ONCOPLASTIC BREAST REDUCTION, RIGHT BALANCING BREAST REDUCTION, BILATERAL BREAST GALAFLEX PLACEMENT - BILATERALONCOPLASTIC LEFT LUMPECTOMY WITH SENTINEL NODE BIOPSY - LEFT  Anesthesia type: General    Vitals Value Taken Time   Temp 36.2 °C (97.2 °F) 07/31/23 1326   Pulse 82 07/31/23 1326   Resp 16 07/31/23 1326   SpO2 97 % 07/31/23 1326   /88 07/31/23 1326         Patient Location: PACU Phase 1  Post-op Vital Signs:stable  Level of Consciousness: awake and alert  Respiratory Status: spontaneous ventilation  Cardiovascular stable  Hydration: euvolemic  Pain Management: adequately controlled  Handoff: Handoff to receiving clinician was performed and questions were answered  Vomiting: none  Nausea: None  Airway Patency:patent  Post-op Assessment: no complications and patient tolerated procedure well      No notable events documented.  
61 yo M PMH IDDM, seizures on Keppra, OM of the right foot s/p debridement June 2022, charcot foot who presents to the ED with right foot diabetic foot wound. Failed 6 week outpatient IV Unasyn therapy, Seen at podiatry clinic for wound care where they stated that his foot ulcer has been refractory to debridement and IV antibiotics; will need amputation; vascular surgery/podiatry following. S/P Right BKA performed 8/4. surgical Cx sent in progress. Physical therapy was re-consulted and is now recommending subacute rehabilitation. Pain management following.     *****************************************THIS DOCUMENT IS INCOMPLETE*********************  
61 yo M PMH IDDM, seizures on Keppra, OM of the right foot s/p debridement June 2022, charcot foot who presents to the ED with right foot diabetic foot wound. Failed 6 week outpatient IV Unasyn therapy, Seen at podiatry clinic for wound care where they stated that his foot ulcer has been refractory to debridement and IV antibiotics; will need amputation; vascular surgery/podiatry following. S/P Right BKA performed 8/4. surgical Cx sent in progress. Physical therapy was re-consulted and is now recommending subacute rehabilitation. Pain management following. Pt is refusing LIGIA. Pt is to go home with home PT and wound care.     upon discharge:  pt will follow up with vascular Dr. Car for outpt staples removal in 2-3 weeks.  Pt will continue to use knee immobilizer nightly for 2 weeks.       
Normal

## 2023-08-09 ENCOUNTER — EMERGENCY (EMERGENCY)
Facility: HOSPITAL | Age: 28
LOS: 1 days | Discharge: HOME | End: 2023-08-09
Attending: EMERGENCY MEDICINE
Payer: MEDICAID

## 2023-08-09 VITALS
DIASTOLIC BLOOD PRESSURE: 82 MMHG | OXYGEN SATURATION: 95 % | TEMPERATURE: 98 F | WEIGHT: 128.97 LBS | HEIGHT: 68 IN | HEART RATE: 112 BPM | SYSTOLIC BLOOD PRESSURE: 155 MMHG | RESPIRATION RATE: 16 BRPM

## 2023-08-09 VITALS
RESPIRATION RATE: 18 BRPM | DIASTOLIC BLOOD PRESSURE: 86 MMHG | TEMPERATURE: 99 F | SYSTOLIC BLOOD PRESSURE: 131 MMHG | OXYGEN SATURATION: 98 %

## 2023-08-09 DIAGNOSIS — F79 UNSPECIFIED INTELLECTUAL DISABILITIES: ICD-10-CM

## 2023-08-09 LAB
ALBUMIN SERPL ELPH-MCNC: 4.4 G/DL — SIGNIFICANT CHANGE UP (ref 3.3–5.2)
ALP SERPL-CCNC: 68 U/L — SIGNIFICANT CHANGE UP (ref 40–120)
ALT FLD-CCNC: 19 U/L — SIGNIFICANT CHANGE UP
AMPHET UR-MCNC: NEGATIVE — SIGNIFICANT CHANGE UP
ANION GAP SERPL CALC-SCNC: 11 MMOL/L — SIGNIFICANT CHANGE UP (ref 5–17)
APPEARANCE UR: CLEAR — SIGNIFICANT CHANGE UP
AST SERPL-CCNC: 19 U/L — SIGNIFICANT CHANGE UP
BARBITURATES UR SCN-MCNC: NEGATIVE — SIGNIFICANT CHANGE UP
BASOPHILS # BLD AUTO: 0 K/UL — SIGNIFICANT CHANGE UP (ref 0–0.2)
BASOPHILS NFR BLD AUTO: 0 % — SIGNIFICANT CHANGE UP (ref 0–2)
BENZODIAZ UR-MCNC: NEGATIVE — SIGNIFICANT CHANGE UP
BILIRUB SERPL-MCNC: 0.3 MG/DL — LOW (ref 0.4–2)
BILIRUB UR-MCNC: NEGATIVE — SIGNIFICANT CHANGE UP
BUN SERPL-MCNC: 13 MG/DL — SIGNIFICANT CHANGE UP (ref 8–20)
CALCIUM SERPL-MCNC: 9.2 MG/DL — SIGNIFICANT CHANGE UP (ref 8.4–10.5)
CHLORIDE SERPL-SCNC: 104 MMOL/L — SIGNIFICANT CHANGE UP (ref 96–108)
CO2 SERPL-SCNC: 26 MMOL/L — SIGNIFICANT CHANGE UP (ref 22–29)
COCAINE METAB.OTHER UR-MCNC: NEGATIVE — SIGNIFICANT CHANGE UP
COLOR SPEC: YELLOW — SIGNIFICANT CHANGE UP
CREAT SERPL-MCNC: 0.95 MG/DL — SIGNIFICANT CHANGE UP (ref 0.5–1.3)
DIFF PNL FLD: NEGATIVE — SIGNIFICANT CHANGE UP
EGFR: 113 ML/MIN/1.73M2 — SIGNIFICANT CHANGE UP
EOSINOPHIL # BLD AUTO: 0.12 K/UL — SIGNIFICANT CHANGE UP (ref 0–0.5)
EOSINOPHIL NFR BLD AUTO: 4.4 % — SIGNIFICANT CHANGE UP (ref 0–6)
ETHANOL SERPL-MCNC: <10 MG/DL — SIGNIFICANT CHANGE UP (ref 0–9)
GIANT PLATELETS BLD QL SMEAR: PRESENT — SIGNIFICANT CHANGE UP
GLUCOSE SERPL-MCNC: 110 MG/DL — HIGH (ref 70–99)
GLUCOSE UR QL: NEGATIVE MG/DL — SIGNIFICANT CHANGE UP
HCT VFR BLD CALC: 39.8 % — SIGNIFICANT CHANGE UP (ref 39–50)
HGB BLD-MCNC: 12.9 G/DL — LOW (ref 13–17)
KETONES UR-MCNC: NEGATIVE — SIGNIFICANT CHANGE UP
LEUKOCYTE ESTERASE UR-ACNC: NEGATIVE — SIGNIFICANT CHANGE UP
LYMPHOCYTES # BLD AUTO: 0.66 K/UL — LOW (ref 1–3.3)
LYMPHOCYTES # BLD AUTO: 24.6 % — SIGNIFICANT CHANGE UP (ref 13–44)
MANUAL SMEAR VERIFICATION: SIGNIFICANT CHANGE UP
MCHC RBC-ENTMCNC: 27.4 PG — SIGNIFICANT CHANGE UP (ref 27–34)
MCHC RBC-ENTMCNC: 32.4 GM/DL — SIGNIFICANT CHANGE UP (ref 32–36)
MCV RBC AUTO: 84.5 FL — SIGNIFICANT CHANGE UP (ref 80–100)
METHADONE UR-MCNC: NEGATIVE — SIGNIFICANT CHANGE UP
MONOCYTES # BLD AUTO: 0.38 K/UL — SIGNIFICANT CHANGE UP (ref 0–0.9)
MONOCYTES NFR BLD AUTO: 14 % — SIGNIFICANT CHANGE UP (ref 2–14)
NEUTROPHILS # BLD AUTO: 1.46 K/UL — LOW (ref 1.8–7.4)
NEUTROPHILS NFR BLD AUTO: 54.4 % — SIGNIFICANT CHANGE UP (ref 43–77)
NITRITE UR-MCNC: NEGATIVE — SIGNIFICANT CHANGE UP
OPIATES UR-MCNC: NEGATIVE — SIGNIFICANT CHANGE UP
PCP SPEC-MCNC: SIGNIFICANT CHANGE UP
PCP UR-MCNC: NEGATIVE — SIGNIFICANT CHANGE UP
PH UR: 6.5 — SIGNIFICANT CHANGE UP (ref 5–8)
PLAT MORPH BLD: NORMAL — SIGNIFICANT CHANGE UP
PLATELET # BLD AUTO: 204 K/UL — SIGNIFICANT CHANGE UP (ref 150–400)
POTASSIUM SERPL-MCNC: 4.2 MMOL/L — SIGNIFICANT CHANGE UP (ref 3.5–5.3)
POTASSIUM SERPL-SCNC: 4.2 MMOL/L — SIGNIFICANT CHANGE UP (ref 3.5–5.3)
PROT SERPL-MCNC: 6.1 G/DL — LOW (ref 6.6–8.7)
PROT UR-MCNC: NEGATIVE — SIGNIFICANT CHANGE UP
RBC # BLD: 4.71 M/UL — SIGNIFICANT CHANGE UP (ref 4.2–5.8)
RBC # FLD: 13.3 % — SIGNIFICANT CHANGE UP (ref 10.3–14.5)
RBC BLD AUTO: NORMAL — SIGNIFICANT CHANGE UP
SMUDGE CELLS # BLD: PRESENT — SIGNIFICANT CHANGE UP
SODIUM SERPL-SCNC: 141 MMOL/L — SIGNIFICANT CHANGE UP (ref 135–145)
SP GR SPEC: 1.01 — SIGNIFICANT CHANGE UP (ref 1.01–1.02)
THC UR QL: NEGATIVE — SIGNIFICANT CHANGE UP
UROBILINOGEN FLD QL: NEGATIVE MG/DL — SIGNIFICANT CHANGE UP
VARIANT LYMPHS # BLD: 2.6 % — SIGNIFICANT CHANGE UP (ref 0–6)
WBC # BLD: 2.69 K/UL — LOW (ref 3.8–10.5)
WBC # FLD AUTO: 2.69 K/UL — LOW (ref 3.8–10.5)

## 2023-08-09 PROCEDURE — 36415 COLL VENOUS BLD VENIPUNCTURE: CPT

## 2023-08-09 PROCEDURE — 73130 X-RAY EXAM OF HAND: CPT | Mod: 26,RT

## 2023-08-09 PROCEDURE — 99284 EMERGENCY DEPT VISIT MOD MDM: CPT | Mod: 25

## 2023-08-09 PROCEDURE — 90715 TDAP VACCINE 7 YRS/> IM: CPT

## 2023-08-09 PROCEDURE — 90471 IMMUNIZATION ADMIN: CPT

## 2023-08-09 PROCEDURE — 81003 URINALYSIS AUTO W/O SCOPE: CPT

## 2023-08-09 PROCEDURE — 99223 1ST HOSP IP/OBS HIGH 75: CPT

## 2023-08-09 PROCEDURE — 80307 DRUG TEST PRSMV CHEM ANLYZR: CPT

## 2023-08-09 PROCEDURE — 90792 PSYCH DIAG EVAL W/MED SRVCS: CPT

## 2023-08-09 PROCEDURE — 73130 X-RAY EXAM OF HAND: CPT

## 2023-08-09 PROCEDURE — 85025 COMPLETE CBC W/AUTO DIFF WBC: CPT

## 2023-08-09 PROCEDURE — 80053 COMPREHEN METABOLIC PANEL: CPT

## 2023-08-09 PROCEDURE — G0378: CPT

## 2023-08-09 RX ORDER — ALBUTEROL 90 UG/1
1 AEROSOL, METERED ORAL ONCE
Refills: 0 | Status: COMPLETED | OUTPATIENT
Start: 2023-08-09 | End: 2023-08-09

## 2023-08-09 RX ORDER — TETANUS TOXOID, REDUCED DIPHTHERIA TOXOID AND ACELLULAR PERTUSSIS VACCINE, ADSORBED 5; 2.5; 8; 8; 2.5 [IU]/.5ML; [IU]/.5ML; UG/.5ML; UG/.5ML; UG/.5ML
0.5 SUSPENSION INTRAMUSCULAR ONCE
Refills: 0 | Status: COMPLETED | OUTPATIENT
Start: 2023-08-09 | End: 2023-08-09

## 2023-08-09 RX ADMIN — TETANUS TOXOID, REDUCED DIPHTHERIA TOXOID AND ACELLULAR PERTUSSIS VACCINE, ADSORBED 0.5 MILLILITER(S): 5; 2.5; 8; 8; 2.5 SUSPENSION INTRAMUSCULAR at 10:35

## 2023-08-09 NOTE — ED BEHAVIORAL HEALTH ASSESSMENT NOTE - OTHER PAST PSYCHIATRIC HISTORY (INCLUDE DETAILS REGARDING ONSET, COURSE OF ILLNESS, INPATIENT/OUTPATIENT TREATMENT)
Currently being treated by psychiatric NP at skilled nursing. No history of inpatient admissions. See HPI.

## 2023-08-09 NOTE — ED PROVIDER NOTE - EYES, MLM
Clear bilaterally, pupils equal, round and reactive to light. no conjunctival injection, no coranl abraion on fluoroscein exam and no foreign parisa identified Clear bilaterally, pupils equal, round and reactive to light. no conjunctival injection, no corneal abrasion on fluoroscein exam and no foreign body identified

## 2023-08-09 NOTE — CHART NOTE - NSCHARTNOTEFT_GEN_A_CORE
ESTEBAN Note: Notified by  provider that pt has been cleared for discharge back to his group home. Called  [facility] [phone] to confirm address and transport needs. SW will follow

## 2023-08-09 NOTE — ED CDU PROVIDER DISPOSITION NOTE - PATIENT PORTAL LINK FT
You can access the FollowMyHealth Patient Portal offered by [facility] by registering at the following website: [email or web address]. By joining [employer]’s FollowMyHealth portal, you will also be able to view your health information using other applications (apps) compatible with our system.
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
## 2023-08-09 NOTE — ED BEHAVIORAL HEALTH ASSESSMENT NOTE - DESCRIPTION
Vital Signs Last 24 Hrs  T(C): 36.9 (09 Aug 2023 09:37), Max: 36.9 (09 Aug 2023 09:37)  T(F): 98.4 (09 Aug 2023 09:37), Max: 98.4 (09 Aug 2023 09:37)  HR: 112 (09 Aug 2023 09:37) (112 - 112)  BP: 155/82 (09 Aug 2023 09:37) (155/82 - 155/82)  BP(mean): --  RR: 16 (09 Aug 2023 09:37) (16 - 16)  SpO2: 95% (09 Aug 2023 09:37) (95% - 95%)    Parameters below as of 09 Aug 2023 09:37  Patient On (Oxygen Delivery Method): room air none Patient has been living in current group home for over the past 4 years, history of Autism. Not currently employed or in relationship.

## 2023-08-09 NOTE — ED BEHAVIORAL HEALTH ASSESSMENT NOTE - CURRENT MEDICATION
Lamotrigine 200 mg BID, Benztropine 1 mg at night, Clonidine 0.2 mg bid, Thorazine 200  mg bid, Risperdal 3 mg bid, Metoprolol 25 mg 1/2 tab bid (12.5), Risperdal 0.5 mg am, Thera-m-plus 1 tab qd, Flonase one nostril qd, Klonopin 0.5mg BID per Ms. Coyle  benztropine 1 mg bid, thorazine 100 mg bid, clonidine 0.3 mg bid, lamictal 200 mg bid, metoprolol 12.5 mg bid, risperdal 6 mg daily, clozaril 50 mg at 8AM and 3PM

## 2023-08-09 NOTE — ED ADULT NURSE REASSESSMENT NOTE - NS ED NURSE REASSESS COMMENT FT1
pt cleared to return to his group home. pt exhibited no signs of aggression or episodes of anger. no attempts to harm self or others.

## 2023-08-09 NOTE — ED ADULT NURSE REASSESSMENT NOTE - NS ED NURSE REASSESS COMMENT FT1
received pt awake and alert, pt is cooperative. pt offers no complaints and denies suicidal or homicidal ideations. pt provided a meal which he consumed 100%. no attempts to harm self or others.

## 2023-08-09 NOTE — ED BEHAVIORAL HEALTH ASSESSMENT NOTE - RISK ASSESSMENT
RF chronic behavioral disorder, impulsive, explosive outbursts  PD denies SI, no h/o Patient is at chronic elevated risk for self-injury and aggression towards others / property given his lifelong, severe psychiatric history, and serious intellectual / cognitive limitations which lay the foundation of lifelong dysfunction across the areas of mood, behaviors and adaptive behaviors.  Patient will continue to engage in self-injurious behaviors and affect lability since these are hallmark of his intellectual disability. Protective factors: stable medical comorbidities; no hx of substance or legal issues. Medication and treatment compliant; lives in a supervised residence where he knows staff well. At this time, Patient's clinical presentation is consistent with his chronic state and consistent with his baseline. Patient has been calm, cooperative and behaved appropriately the entire time he was at the ED. Patient at this time presents as consistent with his baseline. Albeit Patient's baseline is very low, he is not an acute threat to self or others and may be discharged back to residence. Consequently, Patient would not benefit from inpatient hospitalization as there are no acute off-baseline symptoms to target.  Suggesting simplifying medication regimen for Patient. Consider a mood stabilizer as it has been shown to help impulsivity / aggression with intellectually disabled individuals.
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
## 2023-08-09 NOTE — ED BEHAVIORAL HEALTH ASSESSMENT NOTE - DETAILS
hx of aggressive behavior/agitation when frustrated or upset. see HPI as per hpi Keily at group [place] aware of Advised patient to go to nearest ER or call 911, for any of the followin) agitation aggression or anxiety, 2) suicidal or homicidal thoughts 3) worsening of symptoms or 4) side effects of medications

## 2023-08-09 NOTE — ED BEHAVIORAL HEALTH ASSESSMENT NOTE - PAST PSYCHOTROPIC MEDICATION
unknown
Principal Discharge DX:	Cardiac arrest  Secondary Diagnosis:	Cerebral palsy, unspecified type  Secondary Diagnosis:	Respiratory arrest
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
## 2023-08-09 NOTE — ED ADULT NURSE NOTE - OBJECTIVE STATEMENT
Pt awake, NAD. Pt BIBEMS from  for aggressive behavior. Pt calm and cooperative at this time. Pt placed in yellow gown, belongings removed.

## 2023-08-09 NOTE — ED BEHAVIORAL HEALTH ASSESSMENT NOTE - ADDITIONAL DETAILS ALL
no history of suicidality or self harm with suicidal intent. history of self harming behavior when agitated (biting hands etc) per chart review
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
## 2023-08-09 NOTE — ED PROVIDER NOTE - MUSCULOSKELETAL, MLM
Spine appears normal, range of motion is not limited, no muscle or joint tenderness, +ttp to the 5th metacarpal on R hand with abrasion

## 2023-08-09 NOTE — ED BEHAVIORAL HEALTH ASSESSMENT NOTE - OTHER
partial not absent concrete group home group home residents smiles often in childlike manner discussed with group home as well
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
## 2023-08-12 NOTE — ED PROVIDER NOTE - PROGRESS NOTE DETAILS
· Severe pain related to right knee  · Follow-up PT/OT eval : snf recommended JG: Patient is medically cleared and psych consult to be initiated. Discussed case with telepsych; consult pending. Med list from group home faxed to [phone]. Patient cleared by telepsych for d/c; to f/u with primary provider later this week

## 2023-08-19 ENCOUNTER — EMERGENCY (EMERGENCY)
Facility: HOSPITAL | Age: 28
LOS: 1 days | Discharge: HOME | End: 2023-08-19
Attending: EMERGENCY MEDICINE
Payer: MEDICAID

## 2023-08-19 VITALS
HEART RATE: 121 BPM | SYSTOLIC BLOOD PRESSURE: 143 MMHG | HEIGHT: 68 IN | RESPIRATION RATE: 18 BRPM | DIASTOLIC BLOOD PRESSURE: 78 MMHG | OXYGEN SATURATION: 97 % | TEMPERATURE: 98 F | WEIGHT: 130.07 LBS

## 2023-08-19 DIAGNOSIS — F79 UNSPECIFIED INTELLECTUAL DISABILITIES: ICD-10-CM

## 2023-08-19 LAB
ALBUMIN SERPL ELPH-MCNC: 4.8 G/DL — SIGNIFICANT CHANGE UP (ref 3.3–5.2)
ALP SERPL-CCNC: 78 U/L — SIGNIFICANT CHANGE UP (ref 40–120)
ALT FLD-CCNC: 20 U/L — SIGNIFICANT CHANGE UP
ANION GAP SERPL CALC-SCNC: 14 MMOL/L — SIGNIFICANT CHANGE UP (ref 5–17)
AST SERPL-CCNC: 20 U/L — SIGNIFICANT CHANGE UP
BASOPHILS # BLD AUTO: 0 K/UL — SIGNIFICANT CHANGE UP (ref 0–0.2)
BASOPHILS NFR BLD AUTO: 0 % — SIGNIFICANT CHANGE UP (ref 0–2)
BILIRUB SERPL-MCNC: <0.2 MG/DL — LOW (ref 0.4–2)
BUN SERPL-MCNC: 15.4 MG/DL — SIGNIFICANT CHANGE UP (ref 8–20)
CALCIUM SERPL-MCNC: 9.6 MG/DL — SIGNIFICANT CHANGE UP (ref 8.4–10.5)
CHLORIDE SERPL-SCNC: 104 MMOL/L — SIGNIFICANT CHANGE UP (ref 96–108)
CO2 SERPL-SCNC: 26 MMOL/L — SIGNIFICANT CHANGE UP (ref 22–29)
CREAT SERPL-MCNC: 1.04 MG/DL — SIGNIFICANT CHANGE UP (ref 0.5–1.3)
EGFR: 101 ML/MIN/1.73M2 — SIGNIFICANT CHANGE UP
EOSINOPHIL # BLD AUTO: 0.12 K/UL — SIGNIFICANT CHANGE UP (ref 0–0.5)
EOSINOPHIL NFR BLD AUTO: 2.6 % — SIGNIFICANT CHANGE UP (ref 0–6)
GIANT PLATELETS BLD QL SMEAR: PRESENT — SIGNIFICANT CHANGE UP
GLUCOSE SERPL-MCNC: 81 MG/DL — SIGNIFICANT CHANGE UP (ref 70–99)
HCT VFR BLD CALC: 41.8 % — SIGNIFICANT CHANGE UP (ref 39–50)
HGB BLD-MCNC: 13.8 G/DL — SIGNIFICANT CHANGE UP (ref 13–17)
LYMPHOCYTES # BLD AUTO: 0.83 K/UL — LOW (ref 1–3.3)
LYMPHOCYTES # BLD AUTO: 18.3 % — SIGNIFICANT CHANGE UP (ref 13–44)
MANUAL SMEAR VERIFICATION: SIGNIFICANT CHANGE UP
MCHC RBC-ENTMCNC: 27.5 PG — SIGNIFICANT CHANGE UP (ref 27–34)
MCHC RBC-ENTMCNC: 33 GM/DL — SIGNIFICANT CHANGE UP (ref 32–36)
MCV RBC AUTO: 83.3 FL — SIGNIFICANT CHANGE UP (ref 80–100)
MONOCYTES # BLD AUTO: 0.71 K/UL — SIGNIFICANT CHANGE UP (ref 0–0.9)
MONOCYTES NFR BLD AUTO: 15.6 % — HIGH (ref 2–14)
NEUTROPHILS # BLD AUTO: 2.84 K/UL — SIGNIFICANT CHANGE UP (ref 1.8–7.4)
NEUTROPHILS NFR BLD AUTO: 62.6 % — SIGNIFICANT CHANGE UP (ref 43–77)
PLAT MORPH BLD: NORMAL — SIGNIFICANT CHANGE UP
PLATELET # BLD AUTO: 235 K/UL — SIGNIFICANT CHANGE UP (ref 150–400)
POLYCHROMASIA BLD QL SMEAR: SLIGHT — SIGNIFICANT CHANGE UP
POTASSIUM SERPL-MCNC: 4.6 MMOL/L — SIGNIFICANT CHANGE UP (ref 3.5–5.3)
POTASSIUM SERPL-SCNC: 4.6 MMOL/L — SIGNIFICANT CHANGE UP (ref 3.5–5.3)
PROT SERPL-MCNC: 6.5 G/DL — LOW (ref 6.6–8.7)
RAPID RVP RESULT: SIGNIFICANT CHANGE UP
RBC # BLD: 5.02 M/UL — SIGNIFICANT CHANGE UP (ref 4.2–5.8)
RBC # FLD: 13.1 % — SIGNIFICANT CHANGE UP (ref 10.3–14.5)
RBC BLD AUTO: ABNORMAL
SARS-COV-2 RNA SPEC QL NAA+PROBE: SIGNIFICANT CHANGE UP
SODIUM SERPL-SCNC: 144 MMOL/L — SIGNIFICANT CHANGE UP (ref 135–145)
T3 SERPL-MCNC: 102 NG/DL — SIGNIFICANT CHANGE UP (ref 80–200)
T4 AB SER-ACNC: 7.8 UG/DL — SIGNIFICANT CHANGE UP (ref 4.5–12)
TSH SERPL-MCNC: 2.07 UIU/ML — SIGNIFICANT CHANGE UP (ref 0.27–4.2)
VARIANT LYMPHS # BLD: 0.9 % — SIGNIFICANT CHANGE UP (ref 0–6)
WBC # BLD: 4.54 K/UL — SIGNIFICANT CHANGE UP (ref 3.8–10.5)
WBC # FLD AUTO: 4.54 K/UL — SIGNIFICANT CHANGE UP (ref 3.8–10.5)

## 2023-08-19 PROCEDURE — 80053 COMPREHEN METABOLIC PANEL: CPT

## 2023-08-19 PROCEDURE — 71045 X-RAY EXAM CHEST 1 VIEW: CPT

## 2023-08-19 PROCEDURE — 84480 ASSAY TRIIODOTHYRONINE (T3): CPT

## 2023-08-19 PROCEDURE — 84443 ASSAY THYROID STIM HORMONE: CPT

## 2023-08-19 PROCEDURE — 99285 EMERGENCY DEPT VISIT HI MDM: CPT

## 2023-08-19 PROCEDURE — 84439 ASSAY OF FREE THYROXINE: CPT

## 2023-08-19 PROCEDURE — 85025 COMPLETE CBC W/AUTO DIFF WBC: CPT

## 2023-08-19 PROCEDURE — 36415 COLL VENOUS BLD VENIPUNCTURE: CPT

## 2023-08-19 PROCEDURE — 71045 X-RAY EXAM CHEST 1 VIEW: CPT | Mod: 26

## 2023-08-19 PROCEDURE — 84436 ASSAY OF TOTAL THYROXINE: CPT

## 2023-08-19 PROCEDURE — 0225U NFCT DS DNA&RNA 21 SARSCOV2: CPT

## 2023-08-19 PROCEDURE — 90792 PSYCH DIAG EVAL W/MED SRVCS: CPT

## 2023-08-19 PROCEDURE — 99284 EMERGENCY DEPT VISIT MOD MDM: CPT

## 2023-08-19 RX ORDER — METOPROLOL TARTRATE 50 MG
25 TABLET ORAL
Refills: 0 | Status: DISCONTINUED | OUTPATIENT
Start: 2023-08-19 | End: 2023-08-27

## 2023-08-19 RX ORDER — CHLORPROMAZINE HCL 10 MG
100 TABLET ORAL
Refills: 0 | Status: DISCONTINUED | OUTPATIENT
Start: 2023-08-19 | End: 2023-08-27

## 2023-08-19 RX ORDER — CLOZAPINE 150 MG/1
25 TABLET, ORALLY DISINTEGRATING ORAL
Refills: 0 | Status: DISCONTINUED | OUTPATIENT
Start: 2023-08-19 | End: 2023-08-27

## 2023-08-19 RX ORDER — LAMOTRIGINE 25 MG/1
200 TABLET, ORALLY DISINTEGRATING ORAL
Refills: 0 | Status: DISCONTINUED | OUTPATIENT
Start: 2023-08-19 | End: 2023-08-27

## 2023-08-19 RX ORDER — RISPERIDONE 4 MG/1
6 TABLET ORAL AT BEDTIME
Refills: 0 | Status: DISCONTINUED | OUTPATIENT
Start: 2023-08-19 | End: 2023-08-27

## 2023-08-19 RX ORDER — BENZTROPINE MESYLATE 1 MG
1 TABLET ORAL
Refills: 0 | Status: DISCONTINUED | OUTPATIENT
Start: 2023-08-19 | End: 2023-08-27

## 2023-08-19 RX ORDER — POLYETHYLENE GLYCOL 3350 17 G/17G
17 POWDER, FOR SOLUTION ORAL ONCE
Refills: 0 | Status: COMPLETED | OUTPATIENT
Start: 2023-08-19 | End: 2023-08-19

## 2023-08-19 NOTE — ED BEHAVIORAL HEALTH ASSESSMENT NOTE - OTHER
partial not absent smiles often in childlike manner group home residents group home hand flapping concrete discussed with group home as well

## 2023-08-19 NOTE — ED PROVIDER NOTE - PROGRESS NOTE DETAILS
Noreen: I spoke with Nora from [facility] who denies that patient has had fever, cough or diarrhea, that he has been normal and today refused to get in the car after bowling. Medication list is metoprolol 25 mg BID, risperidone 6 mg qhs, clozapine 25 mg 8 am and 3 pm, fluticasone spray AM, benztropine 1 mg BID, chlorpomazine 100 mg BID, clonazepam 0.5 PRN, clonidine 0.3 mg BID, lamotrigine 200 mg BID, miralax 17 g qhs.

## 2023-08-19 NOTE — ED ADULT NURSE NOTE - OBJECTIVE STATEMENT
Pt Alert and oriented  at baseline.  Pt brought in by [employer] due to agitation and an event while out with the group home.  Pt calm at this time.  No complaints of pain or discomfort

## 2023-08-19 NOTE — ED PROVIDER NOTE - ATTENDING CONTRIBUTION TO CARE
Pt discharged in stable condition at this time. MD and this RN reviewed discharge instructions, prescriptions, and follow up with patient at bedside. Pt and significant other at bedside verbalized understanding and deny any needs or questions at this time.         Jarad Estes, TRUDY  07/25/23 2082
I personally saw the patient with the resident, and completed the key components of the history and physical exam. I then discussed the management plan with the resident.    29 y/o M with autism, CP from group home presents c/o chest pain, SOB, sore throat and subjective fevers. Group home notes patient was BIB[employer] after patient refused to leave bowling and became combative.     PE - Syndromic facies, acting normally, flapping, clapping, poor eye contact, no respiratory distress, lungs clear, moves all extremities symmetrically, abd soft NT/ND.    labs, CXR, RVP - patient cleared by .

## 2023-08-19 NOTE — ED BEHAVIORAL HEALTH ASSESSMENT NOTE - RISK ASSESSMENT
[[id number]] Patient is at chronic elevated risk for self-injury and aggression towards others / property given his lifelong, severe psychiatric history, and serious intellectual / cognitive limitations which lay the foundation of lifelong dysfunction across the areas of mood, behaviors and adaptive behaviors.  Patient will continue to engage in self-injurious behaviors and affect lability since these are hallmark of his intellectual disability.   Protective factors: stable medical comorbidities; no hx of substance or legal issues. Medication and treatment compliant; lives in a supervised residence where he knows staff well. At this time, Patient's clinical presentation is consistent with his chronic state and consistent with his baseline. Patient has been calm, cooperative and behaved appropriately the entire time he was at the ED. Patient at this time presents as consistent with his baseline. Albeit Patient's baseline is very low, he is not an acute threat to self or others and may be discharged back to residence. Consequently, Patient would not benefit from inpatient hospitalization as there are no acute off-baseline symptoms to target.

## 2023-08-19 NOTE — ED ADULT NURSE NOTE - NSFALLUNIVINTERV_ED_ALL_ED
Bed/Stretcher in lowest position, wheels locked, appropriate side rails in place/Call bell, personal items and telephone in reach/Instruct patient to call for assistance before getting out of bed/chair/stretcher/Non-slip footwear applied when patient is off stretcher/[place] to call system/Physically safe environment - no spills, clutter or unnecessary equipment/Purposeful proactive rounding/Room/bathroom lighting operational, light cord in reach

## 2023-08-19 NOTE — ED BEHAVIORAL HEALTH ASSESSMENT NOTE - OTHER PAST PSYCHIATRIC HISTORY (INCLUDE DETAILS REGARDING ONSET, COURSE OF ILLNESS, INPATIENT/OUTPATIENT TREATMENT)
Currently being treated by psychiatric NP at [facility]. No history of inpatient admissions. No history of suicide attempts.

## 2023-08-19 NOTE — ED PROVIDER NOTE - NS ED ROS FT
Constitutional: no fever, no chills  Head: NC, AT   Eyes: no redness   ENMT: no nasal congestion/drainage, no sore throat   CV: no chest pain, no edema  Resp: no cough, no dyspnea  GI: no abdominal pain, no nausea, no vomiting, no diarrhea  : no dysuria, no hematuria   Skin: no lesions, no rashes   Neuro: no LOC, no headache, no sensory deficits, no weakness Constitutional: no fever, no chills  Head: NC, AT   Eyes: no redness   ENMT: no nasal congestion/drainage, no sore throat   CV: no chest pain, no edema  Resp: + cough, no dyspnea  GI: no abdominal pain, no nausea, no vomiting, no diarrhea  : no dysuria, no hematuria   Skin: no lesions, no rashes   Neuro: no LOC, no headache, no sensory deficits, no weakness

## 2023-08-19 NOTE — ED PROVIDER NOTE - NSFOLLOWUPINSTRUCTIONS_ED_ALL_ED_FT
- Take your medication as prescribed.  - Follow up with your regular doctor.  - Return to the ED as needed. No Yes

## 2023-08-19 NOTE — ED BEHAVIORAL HEALTH ASSESSMENT NOTE - DESCRIPTION
In behavioral control, has not required PRNs.  T(C): 36.9 (19 Aug 2023 19:09), Max: 36.9 (19 Aug 2023 19:09)  T(F): 98.4 (19 Aug 2023 19:09), Max: 98.4 (19 Aug 2023 19:09)  HR: 121 (19 Aug 2023 19:09) (121 - 121)  BP: 143/78 (19 Aug 2023 19:09) (143/78 - 143/78)  BP(mean): --  ABP: --  ABP(mean): --  RR: 18 (19 Aug 2023 19:09) (18 - 18)  SpO2: 97% (19 Aug 2023 19:09) (97% - 97%)    O2 Parameters below as of 19 Aug 2023 19:09  Patient On (Oxygen Delivery Method): room air none Patient has been living in current group home for over the past 4 years, history of Autism. Not currently employed or in relationship.

## 2023-08-19 NOTE — ED PROVIDER NOTE - PATIENT PORTAL LINK FT
You can access the FollowMyHealth Patient Portal offered by [facility] by registering at the following website: [email or web address]. By joining [employer]’s FollowMyHealth portal, you will also be able to view your health information using other applications (apps) compatible with our system. You can access the FollowMyHealth Patient Portal offered by [facility] by registering at the following website: [email or web address]. By joining [employer]’s FollowMyHealth portal, you will also be able to view your health information using other applications (apps) compatible with our system.

## 2023-08-19 NOTE — ED BEHAVIORAL HEALTH ASSESSMENT NOTE - CURRENT MEDICATION
Per Ms. Coyle:  benztropine 1 mg bid, thorazine 100 mg bid, clonidine 0.3 mg bid, lamictal 200 mg bid, metoprolol 12.5 mg bid, risperdal 6 mg daily, clozaril 50 mg at 8AM and 3PM

## 2023-08-19 NOTE — ED PROVIDER NOTE - OBJECTIVE STATEMENT
Patient is a 29 y/o male with sig pmh of autism, asthma, cerebral palsy presenting with sob and chest pain x1 day. patient says he has had sob and chest pain since yesterday, sore throat and feels hot and cold. Denies fevers, chills, headache, palpitations, shortness of breath, cough, nausea, vomiting, diarrhea, hematuria, dysuria, dark stools, focal neurologic symptoms. Patient is a 27 y/o male with sig pmh of autism, asthma, cerebral palsy from [facility] presenting with sob and chest pain x1 day. patient says he has had sob and chest pain since yesterday, sore throat and feels hot and cold. Denies fevers, chills, headache, palpitations, shortness of breath, cough, nausea, vomiting, diarrhea, hematuria, dysuria, dark stools, focal neurologic symptoms.    Per director at [facility] Nora, patient had no fevers, no diarrhea, no cough

## 2023-08-19 NOTE — ED BEHAVIORAL HEALTH ASSESSMENT NOTE - DETAILS
Keily at [facility] home aware as per hpi hx of aggressive behavior/agitation when frustrated or upset. see HPI Advised patient to go to nearest ER or call 911, for any of the followin) agitation aggression or anxiety, 2) suicidal or homicidal thoughts 3) worsening of symptoms or 4) side effects of medications

## 2023-08-19 NOTE — ED PROVIDER NOTE - CLINICAL SUMMARY MEDICAL DECISION MAKING FREE TEXT BOX
Patient is a 29 y/o with sig pmh of autism, asthma, cerebral palsy presenting for sob and chest pain x 1 day. per patient he has sob, chest pain, and sore throat since yesterday.  on exam he is diffusely sweaty.     will obtain labs, rvp, cxr

## 2023-08-19 NOTE — ED ADULT NURSE NOTE - CHIEF COMPLAINT QUOTE
Pt brought to ED by [facility] from [facility] for aggressive behavior with group home staff.  As per PD, they went [facility] and pt did not want to get in car and became aggressive.  Pt cooperative on arrival to ED states "I don't want to go to the sch dnaiel"

## 2023-08-19 NOTE — ED BEHAVIORAL HEALTH ASSESSMENT NOTE - SUMMARY
27-year-old man, domiciled in group home, PPH of autism, cerebral palsy, ADHD, intellectual disability, intermittent explosive disorder, at times self injurious (biting hands) and behavioral issues at baseline who was brought in by [employer] for agitation after patient did not want to get in car to go home after bowling.     Interviewed patient while patient was sitting upright in bed. Limited history/interview due to patient condition/factors. Throughout encounter smiling inappropriately, childlike manner. Clarified events leading up to presentation. States that he became upset because he did not want to go home after bowling, became agitated, acknowledges that he did this so he could not go home after bowling. Denies any significant affective or psychotic symptoms. Denies any suicidal/homicidal ideations. Aware of his various conditions and poor coping skills/low frustration tolerance and that his outbursts are something he has to continuously work on. Appears to be at behavioral baseline, has not required any agitation PRNs while in ED.    Patient is not an acute risk to self or others at this time, does not require psychiatric hospitalization. Psychiatrically cleared for discharge back to residence.

## 2023-08-19 NOTE — CHART NOTE - NSCHARTNOTEFT_GEN_A_CORE
SW Note: Pt cleared of any psychiatric needs, deemed to be behavioral. Per MD, pt cleared medically for return to [facility]. Both psych MD/ED attending confirmed return to facility.  staff to assist w/ transport for pt tonight.  clearance faxed to facility for review. No other SW needs.

## 2023-08-19 NOTE — ED BEHAVIORAL HEALTH ASSESSMENT NOTE - HPI (INCLUDE ILLNESS QUALITY, SEVERITY, DURATION, TIMING, CONTEXT, MODIFYING FACTORS, ASSOCIATED SIGNS AND SYMPTOMS)
27-year-old man, domiciled in group home, PPH of autism, cerebral palsy, ADHD, intellectual disability, intermittent explosive disorder, at times self injurious (biting hands) and behavioral issues at baseline who was brought in by [employer] for agitation after patient did not want to get in car to go home after bowling.     Interviewed patient while patient was sitting upright in bed. Limited history/interview due to patient condition/factors. Throughout encounter smiling inappropriately, childlike manner. Clarified events leading up to presentation. States that he became upset because he did not want to go home after bowling, became agitated, acknowledges that he did this so he could not go home after bowling. Denies any significant affective or psychotic symptoms. Denies any suicidal/homicidal ideations. Aware of his various conditions and poor coping skills/low frustration tolerance and that his outbursts are something he has to continuously work on. Appears to be at behavioral baseline, has not required any agitation PRNs while in ED.    Collateral information obtained from Keily Coyle,  at [facility] family of services:  States that patient has been at baseline, working with Dr. Hudson to continue optimizing medication regimen. States that patient has a history of behavioral outbursts when he feels frustrated. States that he is allowed to return back to group home upon clearance/discharge.

## 2023-08-19 NOTE — ED PROVIDER NOTE - PHYSICAL EXAMINATION
General: Well appearing in no acute distress, alert and cooperative  Head: Normocephalic, atraumatic  Eyes: PERRLA, no conjunctival injection, no scleral icterus, EOMI  ENMT: Atraumatic external nose and ears, moist mucous membranes, oropharynx clear  Neck: Soft and supple, full ROM without pain, no midline tenderness, no thyromegaly,  no lymphadenopathy  Cardiac: Regular rate and regular rhythm, no murmurs, peripheral pulses 2+ and symmetric in all extremities, no LE edema.  Resp: Unlabored respiratory effort, lungs CTAB, speaking in full sentences, no wheezes  Abd: Soft, non-tender, non-distended, no guarding or rebound  Skin: Warm and sweaty  Neuro: AO x 3, moves all extremities symmetrically, Motor strength 5/5 bilaterally UE and LE, sensation grossly intact

## 2023-08-20 LAB — T4 FREE SERPL-MCNC: 1.3 NG/DL — SIGNIFICANT CHANGE UP (ref 0.9–1.8)

## 2023-08-23 ENCOUNTER — APPOINTMENT (OUTPATIENT)
Dept: CARDIOLOGY | Facility: CLINIC | Age: 28
End: 2023-08-23
Payer: MEDICAID

## 2023-08-23 PROCEDURE — 93306 TTE W/DOPPLER COMPLETE: CPT

## 2023-08-31 ENCOUNTER — EMERGENCY (EMERGENCY)
Facility: HOSPITAL | Age: 28
LOS: 1 days | Discharge: HOME | End: 2023-08-31
Attending: STUDENT IN AN ORGANIZED HEALTH CARE EDUCATION/TRAINING PROGRAM
Payer: MEDICARE

## 2023-08-31 VITALS
TEMPERATURE: 99 F | SYSTOLIC BLOOD PRESSURE: 122 MMHG | WEIGHT: 139.99 LBS | OXYGEN SATURATION: 100 % | RESPIRATION RATE: 16 BRPM | HEART RATE: 101 BPM | DIASTOLIC BLOOD PRESSURE: 72 MMHG | HEIGHT: 68 IN

## 2023-08-31 PROCEDURE — 73130 X-RAY EXAM OF HAND: CPT | Mod: 26,50

## 2023-08-31 PROCEDURE — 73090 X-RAY EXAM OF FOREARM: CPT | Mod: 26,50

## 2023-08-31 PROCEDURE — 73090 X-RAY EXAM OF FOREARM: CPT

## 2023-08-31 PROCEDURE — 73130 X-RAY EXAM OF HAND: CPT

## 2023-08-31 PROCEDURE — 99283 EMERGENCY DEPT VISIT LOW MDM: CPT

## 2023-08-31 PROCEDURE — 99284 EMERGENCY DEPT VISIT MOD MDM: CPT

## 2023-08-31 NOTE — ED PROVIDER NOTE - NSFOLLOWUPINSTRUCTIONS_ED_ALL_ED_FT
Please wash gently with soap and water. Apply antibacterial ointment as needed on laceration sites. Ibuprofen or tylenol recommended for pain as needed. Please return to ED for worsening pain or redness and swelling around laceration sites.

## 2023-08-31 NOTE — ED PROVIDER NOTE - PATIENT PORTAL LINK FT
You can access the FollowMyHealth Patient Portal offered by [facility] by registering at the following website: [email or web address]. By joining [employer]’s FollowMyHealth portal, you will also be able to view your health information using other applications (apps) compatible with our system.
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
## 2023-08-31 NOTE — ED ADULT NURSE NOTE - NSFALLUNIVINTERV_ED_ALL_ED
Bed/Stretcher in lowest position, wheels locked, appropriate side rails in place/Call bell, personal items and telephone in reach/Instruct patient to call for assistance before getting out of bed/chair/stretcher/Non-slip footwear applied when patient is off stretcher/[place] to call system/Physically safe environment - no spills, clutter or unnecessary equipment/Purposeful proactive rounding/Room/bathroom lighting operational, light cord in reach
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
## 2023-08-31 NOTE — ED PROVIDER NOTE - SKIN, MLM
Skin normal color for race, warm, dry and intact. superficial small lacerations on hands, majority on 4th and 5th digits bilateral.

## 2023-09-06 NOTE — ED ADULT NURSE NOTE - NSHOSCREENINGQ1_ED_ALL_ED
Nnamdi Palacios is a 47 year old female presenting for   Chief Complaint   Patient presents with   • Office Visit   • Rectal Bleeding     Rectal bleeding.  When her period is ending the rectal bleeding increases.  Onset - 1 month.  Has to wear a pad at times.  Has cramping like pain with the rectal bleeding.  When the bleeding stops the cramping pain stops.       Denies Latex allergy or sensitivity.    Medication verified and med list updated  Refills needed today: No    Health Maintenance Due   Topic Date Due   • Colorectal Cancer Screen-  04/07/2022   • Pneumococcal Vaccine 0-64 (2 - PCV) 02/16/2023   • Hepatitis B Vaccine (3 of 3 - 19+ 3-dose series) 06/01/2023   • Depression Screening  08/17/2023   • Influenza Vaccine (1) 09/01/2023       Patient is due for the topics as listed above and wishes to proceed with them. Per MD    Depression Screening:  Review Flowsheet  More data exists       8/17/2022   PHQ 2/9 Score   Adult PHQ 2 Score 0   Adult PHQ 2 Interpretation No further screening needed   Little interest or pleasure in activity? Not at all   Feeling down, depressed or hopeless? Not at all        Addressed the Advanced Care Planning (Advance Directives):  No  
SUBJECTIVE:   47-year-old white female is here regarding rectal bleeding.    She has been struggling with irregular menstrual bleeding since she had her IUD put in after  the hysteroscopy D&C in July.  Her last menstrual period lasted for 11 days  Last week she states that she was on the toilet when a considerable amount of blood was passing per rectum it was mucousy there was not really any stool associated with it    She states that she has not been experiencing any constipation or straining and also no diarrhea.    She is getting rectal cramping and gas and also feeling nauseous after eating.    Patient is also currently being treated per a failed lumbar laminectomy and laminotomy with chronic back pain  And is contemplating possible lumbar fusion        OBJECTIVE:  General:    Vitals:    Visit Vitals  /84   Pulse 73   Resp 16   Ht 5' 9\" (1.753 m)   Wt 106.6 kg (235 lb)   LMP 08/21/2023 (Exact Date) Comment: irregular cycles   SpO2 97%   BMI 34.70 kg/m²     The patient is alert and in no distress    Abdomen is obese soft nontender with no mass or organ enlargement no rebound or guarding or rigidity       External genitalia and external anal area are unremarkable    Speculum vaginal exam is performed showing no active bleeding no abnormality  Bimanual pelvic exam showing no tenderness no significant abnormality  Digital rectal exam is performed showing tight sphincter tone no blood on glove no palpable masses no tenderness       ASSESSMENT/PLAN:   1. Is rectal bleeding-with passage of bloody mucus patient is under lot of stress we need to consider possibility of colitis or Crohn's  Drawing labs including CBC CMP sedimentation rate CRP and a stool for calprotectin  She is going to need to have a colonoscopy done    2. Before proceeding to the colonoscopy obtain a pelvic ultrasound to be sure that the IUD is in the correct place  
No

## 2023-09-28 ENCOUNTER — APPOINTMENT (OUTPATIENT)
Dept: CARDIOLOGY | Facility: CLINIC | Age: 28
End: 2023-09-28
Payer: MEDICARE

## 2023-09-28 ENCOUNTER — NON-APPOINTMENT (OUTPATIENT)
Age: 28
End: 2023-09-28

## 2023-09-28 VITALS
WEIGHT: 145 LBS | OXYGEN SATURATION: 96 % | BODY MASS INDEX: 21.48 KG/M2 | HEIGHT: 69 IN | TEMPERATURE: 98.5 F | SYSTOLIC BLOOD PRESSURE: 156 MMHG | HEART RATE: 99 BPM | DIASTOLIC BLOOD PRESSURE: 84 MMHG

## 2023-09-28 PROCEDURE — 99215 OFFICE O/P EST HI 40 MIN: CPT

## 2023-09-28 PROCEDURE — 93000 ELECTROCARDIOGRAM COMPLETE: CPT

## 2023-09-28 RX ORDER — CLOZAPINE 25 MG/1
25 TABLET ORAL TWICE DAILY
Refills: 0 | Status: ACTIVE | COMMUNITY

## 2023-09-28 RX ORDER — CLONIDINE HYDROCHLORIDE 0.3 MG/1
0.3 TABLET ORAL TWICE DAILY
Refills: 0 | Status: ACTIVE | COMMUNITY
Start: 2020-06-17

## 2023-10-23 ENCOUNTER — APPOINTMENT (OUTPATIENT)
Dept: CARDIOLOGY | Facility: CLINIC | Age: 28
End: 2023-10-23

## 2023-10-24 ENCOUNTER — APPOINTMENT (OUTPATIENT)
Dept: CARDIOLOGY | Facility: CLINIC | Age: 28
End: 2023-10-24
Payer: MEDICARE

## 2023-10-24 VITALS
HEART RATE: 81 BPM | OXYGEN SATURATION: 97 % | BODY MASS INDEX: 21.52 KG/M2 | SYSTOLIC BLOOD PRESSURE: 86 MMHG | HEIGHT: 68 IN | WEIGHT: 142 LBS | DIASTOLIC BLOOD PRESSURE: 54 MMHG

## 2023-10-24 VITALS — DIASTOLIC BLOOD PRESSURE: 60 MMHG | SYSTOLIC BLOOD PRESSURE: 94 MMHG

## 2023-10-24 PROCEDURE — 99213 OFFICE O/P EST LOW 20 MIN: CPT

## 2023-10-24 PROCEDURE — 93000 ELECTROCARDIOGRAM COMPLETE: CPT

## 2023-10-24 RX ORDER — CLONIDINE HYDROCHLORIDE 0.2 MG/1
0.2 TABLET ORAL TWICE DAILY
Refills: 0 | Status: DISCONTINUED | COMMUNITY
End: 2023-10-24

## 2023-10-24 RX ORDER — BENZTROPINE MESYLATE 1 MG/1
1 TABLET ORAL TWICE DAILY
Refills: 0 | Status: ACTIVE | COMMUNITY
Start: 2020-02-05

## 2023-10-24 RX ORDER — CLONAZEPAM 0.25 MG/1
0.25 TABLET, ORALLY DISINTEGRATING ORAL TWICE DAILY
Refills: 0 | Status: ACTIVE | COMMUNITY

## 2023-11-03 NOTE — ED PROVIDER NOTE - NS ED ATTENDING STATEMENT MOD
165
I have reviewed and agree with all pertinent clinical information, including history and physical exam and agree with treatment plan of the PA.

## 2023-11-15 NOTE — ED ADULT TRIAGE NOTE - NS ED TRIAGE AVPU SCALE
Alert-The patient is alert, awake and responds to voice. The patient is oriented to time, place, and person. The triage nurse is able to obtain subjective information. Wound Care: Vaseline

## 2024-01-28 ENCOUNTER — EMERGENCY (EMERGENCY)
Facility: HOSPITAL | Age: 29
LOS: 1 days | Discharge: HOME | End: 2024-01-28
Attending: EMERGENCY MEDICINE
Payer: MEDICARE

## 2024-01-28 VITALS
DIASTOLIC BLOOD PRESSURE: 93 MMHG | RESPIRATION RATE: 16 BRPM | TEMPERATURE: 97 F | OXYGEN SATURATION: 98 % | HEART RATE: 125 BPM | SYSTOLIC BLOOD PRESSURE: 163 MMHG

## 2024-01-28 VITALS
SYSTOLIC BLOOD PRESSURE: 144 MMHG | DIASTOLIC BLOOD PRESSURE: 70 MMHG | HEART RATE: 110 BPM | TEMPERATURE: 98 F | RESPIRATION RATE: 17 BRPM | OXYGEN SATURATION: 98 %

## 2024-01-28 DIAGNOSIS — F84.0 AUTISTIC DISORDER: ICD-10-CM

## 2024-01-28 PROCEDURE — 99284 EMERGENCY DEPT VISIT MOD MDM: CPT

## 2024-01-28 PROCEDURE — 90792 PSYCH DIAG EVAL W/MED SRVCS: CPT

## 2024-01-28 RX ORDER — LAMOTRIGINE 25 MG/1
200 TABLET, ORALLY DISINTEGRATING ORAL ONCE
Refills: 0 | Status: COMPLETED | OUTPATIENT
Start: 2024-01-28 | End: 2024-01-28

## 2024-01-28 RX ORDER — RISPERIDONE 4 MG/1
6 TABLET ORAL ONCE
Refills: 0 | Status: COMPLETED | OUTPATIENT
Start: 2024-01-28 | End: 2024-01-28

## 2024-01-28 RX ORDER — BENZTROPINE MESYLATE 1 MG
1 TABLET ORAL ONCE
Refills: 0 | Status: COMPLETED | OUTPATIENT
Start: 2024-01-28 | End: 2024-01-28

## 2024-01-28 RX ORDER — CHLORPROMAZINE HCL 10 MG
100 TABLET ORAL ONCE
Refills: 0 | Status: COMPLETED | OUTPATIENT
Start: 2024-01-28 | End: 2024-01-28

## 2024-01-28 RX ORDER — CLOZAPINE 150 MG/1
50 TABLET, ORALLY DISINTEGRATING ORAL ONCE
Refills: 0 | Status: COMPLETED | OUTPATIENT
Start: 2024-01-28 | End: 2024-01-28

## 2024-01-28 RX ORDER — METOPROLOL TARTRATE 50 MG
12.5 TABLET ORAL ONCE
Refills: 0 | Status: COMPLETED | OUTPATIENT
Start: 2024-01-28 | End: 2024-01-28

## 2024-01-28 RX ADMIN — Medication 1 MILLIGRAM(S): at 11:27

## 2024-01-28 RX ADMIN — Medication 0.3 MILLIGRAM(S): at 11:25

## 2024-01-28 RX ADMIN — RISPERIDONE 6 MILLIGRAM(S): 4 TABLET ORAL at 11:27

## 2024-01-28 RX ADMIN — Medication 12.5 MILLIGRAM(S): at 11:26

## 2024-01-28 RX ADMIN — Medication 100 MILLIGRAM(S): at 11:28

## 2024-01-28 RX ADMIN — CLOZAPINE 50 MILLIGRAM(S): 150 TABLET, ORALLY DISINTEGRATING ORAL at 11:25

## 2024-01-28 RX ADMIN — LAMOTRIGINE 200 MILLIGRAM(S): 25 TABLET, ORALLY DISINTEGRATING ORAL at 11:25

## 2024-01-28 NOTE — ED PROVIDER NOTE - PHYSICAL EXAMINATION
Const: Awake, alert and oriented. In no acute distress. Well appearing. Hyperactive, baseline activity.  HEENT: NC/AT. Moist mucous membranes.  Eyes: No scleral icterus. EOMI.  Neck:. Soft and supple. Full ROM without pain.  Cardiac: Regular rate and regular rhythm. +S1/S2. No murmurs. Peripheral pulses 2+ and symmetric. No LE edema.  Resp: Speaking in full sentences. No evidence of respiratory distress. No wheezes, rales or rhonchi.  Abd: Soft, non-tender, non-distended. Normal bowel sounds in all 4 quadrants. No guarding or rebound.  Back: Spine midline and non-tender. No CVAT.  Skin: No rashes, abrasions or lacerations.  Neuro: Awake, alert & oriented x 3. Moves all extremities symmetrically. Flailing, clapping hands, sporadic flailing of arms and legs. Following commands.

## 2024-01-28 NOTE — ED PROVIDER NOTE - PATIENT PORTAL LINK FT
You can access the FollowMyHealth Patient Portal offered by [facility] by registering at the following website: [email or web address]. By joining [employer]’s FollowMyHealth portal, you will also be able to view your health information using other applications (apps) compatible with our system.

## 2024-01-28 NOTE — ED PROVIDER NOTE - OBJECTIVE STATEMENT
28-year-old male with past medical history CP, autism, MR, explosive personality disorder, well-known to this ED for agitation presents with aide from his group home after refusing to take his psych medication.  Patient constantly shushing the aide, states the reason he is truly here is because he has cough and runny nose and feels sick, which is why he is refusing to take his medication this morning, but states he took it last night.  He states he has had COVID multiple times and is concerned he might have COVID.

## 2024-01-28 NOTE — ED BEHAVIORAL HEALTH ASSESSMENT NOTE - HPI (INCLUDE ILLNESS QUALITY, SEVERITY, DURATION, TIMING, CONTEXT, MODIFYING FACTORS, ASSOCIATED SIGNS AND SYMPTOMS)
Patient is a 27 year old, male; domiciled with fellow residents at [facility]; single; noncaregiver; disabled; past psychiatric history of autism, ADHD, intellectual disability, intermittent explosive disorder; no psychiatric  hospitalizations; no known suicide attempts; no known history of violence or arrests; history of agitation; no active substance abuse or known history of complicated withdrawal; PMH of cerebral palsy; brought in by EMS; called by staff at [facility]; presenting with agitation; in the setting of refusing to take his medication.     Upon assessment, pt presents sitting upright in bed, limited history due to patient's chronic health issues, laughing inappropriately at times during assessment, stating "I got upset." Per staff at bedside pt became upset with a particular staff member and refused to take his medications in the context of stating/feeling he has a fever, chills, nausea and COVID. Pt endorses getting upset and states he is going to try to keep calm when returning to the home. Denies any significant affective or psychotic symptoms. Denies any suicidal/homicidal ideations. Aware of his various conditions and poor coping skills/low frustration tolerance and that his outbursts are something he has to continuously work on.    Staff at bedside denies any safety concerns and endorses the pt having a history with a particular staff member which he feels triggered the sequence of events leading to the pt refusing his medications this morning.
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
## 2024-01-28 NOTE — ED ADULT NURSE NOTE - CHIEF COMPLAINT QUOTE
Pt. BIBA from [facility] home for refusing to take his psych meds. As per EMS< pt. refuses to take psych meds because hes not feeling good and wants to go to hospital. Pt. is complaining of cold symptoms, cough and subjective fever. Cooperative, but fidgety in triage.

## 2024-01-28 NOTE — ED BEHAVIORAL HEALTH ASSESSMENT NOTE - DESCRIPTION
Domiciled at [facility]; family in the [place]; history of intellectual disability, cerebral palsy, autism Cerebral palsy No acute events.     ICU Vital Signs Last 24 Hrs  T(C): 36.3 (28 Jan 2024 08:39), Max: 36.3 (28 Jan 2024 08:39)  T(F): 97.3 (28 Jan 2024 08:39), Max: 97.3 (28 Jan 2024 08:39)  HR: 125 (28 Jan 2024 08:39) (125 - 125)  BP: 163/93 (28 Jan 2024 08:39) (163/93 - 163/93)  BP(mean): --  ABP: --  ABP(mean): --  RR: 16 (28 Jan 2024 08:39) (16 - 16)  SpO2: 98% (28 Jan 2024 08:39) (98% - 98%)    O2 Parameters below as of 28 Jan 2024 08:39  Patient On (Oxygen Delivery Method): room air

## 2024-01-28 NOTE — CHART NOTE - NSCHARTNOTEFT_GEN_A_CORE
ESTEBAN Note: ESTEBAN was informed by Psych NP that pt was cleared by them and that pt is from a Group Home w/ staff at bedside. ESTEBAN later tried to meet w/pt and staff at bedside - room empty. ESTEBAN met w/RN - RN reports they had tried to reach out to  as staff needed a Release form for pt to return back to . ESTEBAN informed RN that room was empty - RN suggested pt might be at the restroom. SW returned to room again and was informed by admissions staff member that pt had already left. ESTEBAN reached out to  [KELLY Rivers in [place]]  #[phone] and spoke to Viola. Viola was able to provide phone number to pt's  [Bonnie Coyle [phone]]. SW called and left a VM to Bonnie. ESTEBAN will follow.

## 2024-01-28 NOTE — ED PROVIDER NOTE - NS ED ROS FT
Const: Denies fever, chills  HEENT: + Rhinorrhea. Denies blurry vision, sore throat  Neck: Denies neck pain/stiffness  Resp: + coughing. Denies SOB  Cardiovascular: Denies CP, palpitations, LE edema  GI: Denies nausea, vomiting, abdominal pain, diarrhea, constipation, blood in stool  : Denies urinary frequency/urgency/dysuria, hematuria  MSK: Denies back pain  Neuro: Denies HA, dizziness, numbness, weakness  Skin: Denies rashes. [Heartburn] : heartburn [Negative] : Genitourinary [Abdominal Pain] : no abdominal pain [Nausea] : no nausea [Vomiting] : no vomiting [[email or web address]] : pain near rectum with history of hemorrhoid

## 2024-01-28 NOTE — ED BEHAVIORAL HEALTH ASSESSMENT NOTE - RISK ASSESSMENT
RF: impulsivity, poor coping, intellectually limited  PF: domiciled, no substance use, connected to treatment

## 2024-01-28 NOTE — ED BEHAVIORAL HEALTH ASSESSMENT NOTE - CURRENT MEDICATION
benztropine 1 mg bid,   thorazine 100 mg bid,   clonidine 0.3 mg bid,   lamictal 200 mg bid,   metoprolol 12.5 mg bid,   risperdal 6 mg daily,   clozaril 50 mg at 8AM and 3PM

## 2024-01-28 NOTE — ED BEHAVIORAL HEALTH ASSESSMENT NOTE - SUMMARY
28 year-old man, domiciled in group home, PPH of autism, cerebral palsy, ADHD, intellectual disability, intermittent explosive disorder, at times self injurious (biting hands) and behavioral issues at baseline who was brought in for agitation after patient refused to take medications this morning in the context of feeling he is sick with COVID.     Upon assessment, pt acknowledges that he reacted poorly this morning; staff at bedside endorses pt having a history of conflict with the staff member that was attempting to distribute medications this morning. Pt expressed concerns surrounding his physical health which writer explained would be addressed by ED staff. Pt denied any acute psychiatric symptoms inc SI/HI/AH/VH/paranoia. Staff at bedside denied any safety concerns with the pt returning to the home. Pt appears to be at behavioral baseline, has not required any agitation PRNs while in ED. Pt does not warrant further psychiatric observation or treatment and is psychiatrically cleared to return home.  Case discussed with Dr. Lemons who is in agreement with assessment and plan.

## 2024-01-28 NOTE — ED ADULT TRIAGE NOTE - CHIEF COMPLAINT QUOTE
Pt. BIBA from [facility] home for refusing to take his psych meds. As per EMS< pt. refuses to take psych meds because hes not feeling good and wants to go to hospital. Pt. is complaining of cold symptoms, cough and subjective fever. Cooperative, but fidgety in triage.

## 2024-01-28 NOTE — ED ADULT NURSE NOTE - OBJECTIVE STATEMENT
PAtient comes from [facility] for refusing to take his psych meds. As per EMS< pt. refuses to take psych meds because hes not feeling good and wants to go to hospital. Pt. is complaining of cold symptoms, cough and subjective fever. Cooperative, but fidgety in triage.

## 2024-01-28 NOTE — ED PROVIDER NOTE - CLINICAL SUMMARY MEDICAL DECISION MAKING FREE TEXT BOX
28-year-old male with autism, MR, violent outbursts presents after he became agitated towards other residents and staff with destruction of property, he complained of feeling sick when police arrived.  Patient complains of feeling sick, without any symptoms.  Vitals within normal limits.  Patient in no respiratory distress.  Per , there are no sick contacts in the house and patient was not sick until police arrived, she has no health concerns.  I discussed with  PA who will evaluate patient.

## 2024-01-28 NOTE — ED BEHAVIORAL HEALTH ASSESSMENT NOTE - NSBHMSETHTCONTENT_PSY_A_CORE
Detail Level: Detailed Quality 402: Tobacco Use And Help With Quitting Among Adolescents: Patient screened for tobacco and never smoked Quality 130: Documentation Of Current Medications In The Medical Record: Eligible clinician attests to documenting in the medical record the patient is not eligible for a current list of medications being obtained, updated, or reviewed by the eligible clinician Additional Notes: Patient does not have dose and frequency of medications Unremarkable

## 2024-02-19 NOTE — ED ADULT TRIAGE NOTE - STATUS:
[facility]        NAME: Darcy Fairbanks is a 46 y.o. female  : 1977    MRN: [id number]  DATE: 2024  TIME: 3:21 PM    Assessment and Plan   Acute conjunctivitis of left eye, unspecified acute conjunctivitis type [H10.32]  1. Acute conjunctivitis of left eye, unspecified acute conjunctivitis type  moxifloxacin (VIGAMOX) 0.5 % ophthalmic solution            Patient Instructions     Discontinue the use of corrective contact lenses while taking antibiotic eye drops  Use antibiotic drops as prescribed   Apply cold compresses  Avoid touching eyes  Wash hands frequently  Change pillowcases daily  Follow up with PCP in 3-5 days.  Proceed to  ER if symptoms worsen.    Chief Complaint     Chief Complaint   Patient presents with   • Eye Drainage     Left eye red, drainage, crusty this morning. Pt wears contacts, has glasses on now..          History of Present Illness       Patient is a 45 yo female with no significant PMH presenting in the clinic today for eye redness which began last night. Admits left eye discharge and left eye redness. Admits the use of corrective contact lenses. Denies fever, chills, headache, dizziness, photophobia, visual changes, sore throat, ear pain, chest pain, and SOB. Admits the use of allergy medications for sx management. Positive recent sick contacts with conjunctivitis.      Review of Systems   Review of Systems   Constitutional:  Negative for chills and fever.   HENT:  Negative for ear pain and sore throat.    Eyes:  Positive for discharge and redness. Negative for photophobia, pain, itching and visual disturbance.   Respiratory:  Negative for cough.    Cardiovascular:  Negative for chest pain.   Neurological:  Negative for dizziness and headaches.         Current Medications       Current Outpatient Medications:   •  albuterol (PROVENTIL HFA,VENTOLIN HFA) 90 mcg/act inhaler, Inhale 2 puffs every 6 (six) hours as needed for wheezing, Disp: 18 g, Rfl: 0  •   fluticasone (FLONASE) 50 mcg/act nasal spray, 2 sprays into each nostril daily, Disp: 16 g, Rfl: 0  •  Fluticasone-Salmeterol (Advair Diskus) 100-50 mcg/dose inhaler, Inhale 1 puff 2 (two) times a day Rinse mouth after use., Disp: 60 blister, Rfl: 1  •  hydrOXYzine pamoate (VISTARIL) 25 mg capsule, Take 1 capsule (25 mg total) by mouth daily at bedtime as needed (insomnia), Disp: 90 capsule, Rfl: 0  •  montelukast (SINGULAIR) 10 mg tablet, Take 1 tablet (10 mg total) by mouth daily at bedtime, Disp: 90 tablet, Rfl: 1  •  moxifloxacin (VIGAMOX) 0.5 % ophthalmic solution, Administer 1 drop into the left eye 3 (three) times a day, Disp: 3 mL, Rfl: 0  •  azelastine (ASTELIN) 0.1 % nasal spray, 1 spray into each nostril 2 (two) times a day Use in each nostril as directed (Patient not taking: Reported on 2/8/2022), Disp: 30 mL, Rfl: 2  •  benzonatate (TESSALON) 200 MG capsule, Take 1 capsule (200 mg total) by mouth 3 (three) times a day as needed for cough, Disp: 20 capsule, Rfl: 0  •  cetirizine (ZyrTEC) 10 mg tablet, Take 10 mg by mouth, Disp: , Rfl:   •  EPINEPHrine (Auvi-Q) 0.3 mg/0.3 mL SOAJ, Inject 0.3 mL (0.3 mg total) into a muscle once for 1 dose, Disp: 0.6 mL, Rfl: 0  •  fluconazole (DIFLUCAN) 150 mg tablet, , Disp: , Rfl:   •  fluticasone (Flovent HFA) 110 MCG/ACT inhaler, Inhale 2 puffs 2 (two) times a day Rinse mouth after use. (Patient not taking: Reported on 10/24/2023), Disp: 18 g, Rfl: 0  •  mometasone (Asmanex, 60 Metered Doses,) 220 mcg/actuation inhaler, Inhale 1 puff every evening Rinse mouth after use. (Patient not taking: Reported on 6/27/2023), Disp: 1 each, Rfl: 5  •  mometasone (NASONEX) 50 mcg/act nasal spray, 2 sprays into each nostril daily (Patient not taking: Reported on 2/22/2022), Disp: 17 g, Rfl: 3  •  neomycin-polymyxin-hydrocortisone (CORTISPORIN) otic solution, Administer 4 drops to the right ear every 6 (six) hours (Patient not taking: Reported on 11/9/2022), Disp: 10 mL, Rfl:  0    Current Allergies     Allergies as of 02/19/2024 - Reviewed 02/19/2024   Allergen Reaction Noted   • Clindamycin Hives 04/26/2016   • Peanut oil - food allergy Hives 04/26/2016   • Shellfish-derived products - food allergy Hives 04/26/2016            The following portions of the patient's history were reviewed and updated as appropriate: allergies, current medications, past family history, past medical history, past social history, past surgical history and problem list.     Past Medical History:   Diagnosis Date   • Allergic     followed by Dr. Wells - no immunotherapy at this time   • Depression 6/27/2023   • Mild asthma     borderline asthma - takes medication daily       Past Surgical History:   Procedure Laterality Date   • NO PAST SURGERIES         Family History   Problem Relation Age of Onset   • Diabetes Mother    • Colon cancer Father          Medications have been verified.        Objective   /70   Pulse 88   Temp 98.6 °F (37 °C) (Tympanic)   Resp 18   SpO2 99%        Physical Exam     Physical Exam  Vitals reviewed.   Constitutional:       General: She is not in acute distress.     Appearance: Normal appearance. She is normal weight. She is not ill-appearing.   HENT:      Head: Normocephalic.      Nose: Nose normal.      Mouth/Throat:      Mouth: Mucous membranes are moist.   Eyes:      General: Lids are normal.         Right eye: No foreign body or discharge.         Left eye: Discharge present.No foreign body.      Extraocular Movements: Extraocular movements intact.      Right eye: No nystagmus.      Left eye: No nystagmus.      Conjunctiva/sclera:      Right eye: Right conjunctiva is not injected. No chemosis.     Left eye: Left conjunctiva is injected. No chemosis.     Pupils: Pupils are equal, round, and reactive to light.   Cardiovascular:      Rate and Rhythm: Normal rate and regular rhythm.      Pulses: Normal pulses.      Heart sounds: Normal heart sounds. No murmur heard.      No friction rub. No gallop.   Pulmonary:      Effort: Pulmonary effort is normal.      Breath sounds: Normal breath sounds. No wheezing, rhonchi or rales.   Musculoskeletal:      Cervical back: Normal range of motion and neck supple.   Skin:     General: Skin is warm.      Findings: No rash.   Neurological:      Mental Status: She is alert.   Psychiatric:         Mood and Affect: Mood normal.         Behavior: Behavior normal.                  Intact

## 2024-02-29 NOTE — ED PROVIDER NOTE - CPE EDP ENMT NORM
New patient alert and pleasant with no complaints.  Here today for annual GYN exam.  Pelvic exam, pap smear obtained, labeled  and delivered to lab.  Breast exam completed by doctor.   Discharge instructions have been discussed with the patient. Patient advised to call our office with any questions or concerns.   Voiced understanding.   Health track RX specimen obtained, labeled and sent to lab via [employer] delivery.     normal...

## 2024-03-06 ENCOUNTER — OFFICE (OUTPATIENT)
Dept: URBAN - METROPOLITAN AREA CLINIC 112 | Facility: CLINIC | Age: 29
Setting detail: OPHTHALMOLOGY
End: 2024-03-06
Payer: MEDICARE

## 2024-03-06 DIAGNOSIS — H53.10: ICD-10-CM

## 2024-03-06 PROCEDURE — 92014 COMPRE OPH EXAM EST PT 1/>: CPT | Performed by: OPHTHALMOLOGY

## 2024-03-12 NOTE — ED PROVIDER NOTE - IV ALTEPLASE EXCL ABS HIDDEN
IR RN note    Patient underwent a abscessogram with drain change - up sized by Dr. Howard.  Procedure site was verified by MD using imaging guidance. Consent was signed.  IR eleazar Sky and Fab assisted. Patient was placed in a supine position.  Vitals were taken every 5 minutes and remained stable during procedure (see doc flow sheet for results).  CO2 waveform capnography was monitored throughout procedure, see chart.  LLQ  access site.   A sutre, gauze and tape dressing was placed over surgical site. Report called to PPU RN. Pt transported by laila with RN to [facility], with monitor .   Reviewed sedation orders with MD RAMOS procedure ended at 1140      ZenoLink  Flexima APDL 14Fr X 25cm  REF # [id number]  LOT # [id number]  Exp. 01-    2 ml of brown cloudy fluid sent to lab for fluid culture.   
show

## 2024-04-04 NOTE — ED ADULT NURSE NOTE - CHIEF COMPLAINT QUOTE
pt BIBEMS from group home after drinking a disinfectant. aide at bedside. pt complaining of abdominal pain, N/V.
Yes

## 2024-05-06 ENCOUNTER — EMERGENCY (EMERGENCY)
Facility: HOSPITAL | Age: 29
LOS: 1 days | Discharge: HOME | End: 2024-05-06
Attending: EMERGENCY MEDICINE
Payer: MEDICARE

## 2024-05-06 VITALS
WEIGHT: 147.71 LBS | DIASTOLIC BLOOD PRESSURE: 86 MMHG | SYSTOLIC BLOOD PRESSURE: 132 MMHG | HEIGHT: 66 IN | TEMPERATURE: 98 F | HEART RATE: 117 BPM | OXYGEN SATURATION: 98 % | RESPIRATION RATE: 20 BRPM

## 2024-05-06 LAB
APPEARANCE UR: ABNORMAL
BACTERIA # UR AUTO: ABNORMAL /HPF
BILIRUB UR-MCNC: NEGATIVE — SIGNIFICANT CHANGE UP
CAST: 1 /LPF — SIGNIFICANT CHANGE UP (ref 0–4)
COLOR SPEC: SIGNIFICANT CHANGE UP
DIFF PNL FLD: ABNORMAL
GLUCOSE UR QL: NEGATIVE MG/DL — SIGNIFICANT CHANGE UP
KETONES UR-MCNC: ABNORMAL MG/DL
LEUKOCYTE ESTERASE UR-ACNC: ABNORMAL
NITRITE UR-MCNC: POSITIVE
PH UR: 6 — SIGNIFICANT CHANGE UP (ref 5–8)
PROT UR-MCNC: 100 MG/DL
RBC CASTS # UR COMP ASSIST: 199 /HPF — HIGH (ref 0–4)
SP GR SPEC: 1.02 — SIGNIFICANT CHANGE UP (ref 1–1.03)
SQUAMOUS # UR AUTO: 4 /HPF — SIGNIFICANT CHANGE UP (ref 0–5)
UROBILINOGEN FLD QL: 1 MG/DL — SIGNIFICANT CHANGE UP (ref 0.2–1)
WBC UR QL: >998 /HPF — HIGH (ref 0–5)

## 2024-05-06 PROCEDURE — 76870 US EXAM SCROTUM: CPT

## 2024-05-06 PROCEDURE — 99284 EMERGENCY DEPT VISIT MOD MDM: CPT | Mod: FS

## 2024-05-06 PROCEDURE — 87086 URINE CULTURE/COLONY COUNT: CPT

## 2024-05-06 PROCEDURE — 87186 SC STD MICRODIL/AGAR DIL: CPT

## 2024-05-06 PROCEDURE — 99284 EMERGENCY DEPT VISIT MOD MDM: CPT

## 2024-05-06 PROCEDURE — 81001 URINALYSIS AUTO W/SCOPE: CPT

## 2024-05-06 PROCEDURE — 87077 CULTURE AEROBIC IDENTIFY: CPT

## 2024-05-06 PROCEDURE — 76870 US EXAM SCROTUM: CPT | Mod: 26

## 2024-05-06 RX ORDER — ACETAMINOPHEN 500 MG
650 TABLET ORAL ONCE
Refills: 0 | Status: COMPLETED | OUTPATIENT
Start: 2024-05-06 | End: 2024-05-06

## 2024-05-06 RX ORDER — CEFPODOXIME PROXETIL 100 MG
200 TABLET ORAL ONCE
Refills: 0 | Status: COMPLETED | OUTPATIENT
Start: 2024-05-06 | End: 2024-05-06

## 2024-05-06 RX ORDER — CEFPODOXIME PROXETIL 100 MG
1 TABLET ORAL
Qty: 14 | Refills: 0
Start: 2024-05-06 | End: 2024-05-12

## 2024-05-06 RX ADMIN — Medication 650 MILLIGRAM(S): at 22:12

## 2024-05-06 RX ADMIN — Medication 200 MILLIGRAM(S): at 23:55

## 2024-05-06 NOTE — ED PROVIDER NOTE - PATIENT PORTAL LINK FT
You can access the FollowMyHealth Patient Portal offered by [facility] by registering at the following website: [email or web address]. By joining [facility]’s FollowMyHealth portal, you will also be able to view your health information using other applications (apps) compatible with our system.

## 2024-05-06 NOTE — ED PROVIDER NOTE - OBJECTIVE STATEMENT
27 y/o male hx cerebral palsy, autism, present with 1 day of dysuria/penile pain. usual state of health this morning. no fever, no abd pain, no vomiting. denies being sexually active. reports some discomfort left testicle. no other complaints.

## 2024-05-06 NOTE — ED PROVIDER NOTE - PHYSICAL EXAMINATION
Gen: No acute distress, non toxic  HEENT: Mucous membranes moist, pink conjunctivae, EOMI  CV: RRR, nl s1/s2.  Resp: CTAB, normal rate and effort  GI: Abdomen soft, NT, ND. No rebound, no guarding  : No CVAT chaperoned by CECE Ram left testicle with mild ttp, no swelling, somewhat higher.   Neuro: A&O x 3, moving all 4 extremities  MSK: No spine or joint tenderness to palpation  Skin: No rashes. intact and perfused.

## 2024-05-06 NOTE — ED PROVIDER NOTE - CLINICAL SUMMARY MEDICAL DECISION MAKING FREE TEXT BOX
Deanne: 27 y/o male hx cerebral palsy, autism, present with 1 day of dysuria/penile pain. usual state of health this morning. no fever, no abd pain, no vomiting. denies being sexually active. reports some discomfort left testicle. no other complaints.  left testicle somewhat higher with mild ttp. somewhat limited historian. will check urine and sono testes eval torsion. Alicea: 27 y/o male hx cerebral palsy, autism, present with 1 day of dysuria/penile pain. usual state of health this morning. no fever, no abd pain, no vomiting. denies being sexually active. reports some discomfort left testicle. no other complaints.  left testicle somewhat higher with mild ttp. somewhat limited historian. will check urine and sono testes eval torsion.    Mona Emery PA : PT evaluated by intake physician. HPI/PE/ROS as noted above. Will follow up plan per intake physician  +UTI. US small hydroceles. Will dc on ABX for UTI,f/u PCP.

## 2024-05-06 NOTE — ED PROVIDER NOTE - ATTENDING APP SHARED VISIT CONTRIBUTION OF CARE
Deanne: I performed a face to face bedside interview with patient regarding history of present illness, review of symptoms and past medical history. I completed an independent physical exam and ordered tests/medications as needed.  I have discussed patient's plan of care with advanced care provider. The advanced care provider assisted in  executing the discussed plan. I was available for any questions or issues that may have arose during the execution of the plan of care.

## 2024-05-06 NOTE — ED PROVIDER NOTE - NSFOLLOWUPINSTRUCTIONS_ED_ALL_ED_FT
Please take antibiotics as directed  Please follow up with primary care doctor in 2-3 days  Return to ER for any new or worsening symptoms    Urinary Tract Infection    A urinary tract infection (UTI) is an infection of any part of the urinary tract, which includes the kidneys, ureters, bladder, and urethra. Risk factors include ignoring your need to urinate, wiping back to front if female, being an uncircumcised male, and having diabetes or a weak immune system. Symptoms include frequent urination, pain or burning with urination, foul smelling urine, cloudy urine, pain in the lower abdomen, blood in the urine, and fever. If you were prescribed an antibiotic medicine, take it as told by your health care provider. Do not stop taking the antibiotic even if you start to feel better.    SEEK IMMEDIATE MEDICAL CARE IF YOU HAVE ANY OF THE FOLLOWING SYMPTOMS: severe back or abdominal pain, fever, inability to keep fluids or medicine down, dizziness/lightheadedness, or a change in mental status.

## 2024-05-07 ENCOUNTER — APPOINTMENT (OUTPATIENT)
Dept: CARDIOLOGY | Facility: CLINIC | Age: 29
End: 2024-05-07
Payer: MEDICARE

## 2024-05-07 ENCOUNTER — NON-APPOINTMENT (OUTPATIENT)
Age: 29
End: 2024-05-07

## 2024-05-07 VITALS
BODY MASS INDEX: 22.73 KG/M2 | TEMPERATURE: 98.6 F | DIASTOLIC BLOOD PRESSURE: 76 MMHG | OXYGEN SATURATION: 97 % | WEIGHT: 150 LBS | SYSTOLIC BLOOD PRESSURE: 115 MMHG | HEIGHT: 68 IN | HEART RATE: 106 BPM

## 2024-05-07 DIAGNOSIS — R06.09 OTHER FORMS OF DYSPNEA: ICD-10-CM

## 2024-05-07 DIAGNOSIS — Z00.00 ENCOUNTER FOR GENERAL ADULT MEDICAL EXAMINATION W/OUT ABNORMAL FINDINGS: ICD-10-CM

## 2024-05-07 DIAGNOSIS — F90.9 ATTENTION-DEFICIT HYPERACTIVITY DISORDER, UNSPECIFIED TYPE: ICD-10-CM

## 2024-05-07 DIAGNOSIS — I10 ESSENTIAL (PRIMARY) HYPERTENSION: ICD-10-CM

## 2024-05-07 DIAGNOSIS — R94.31 ABNORMAL ELECTROCARDIOGRAM [ECG] [EKG]: ICD-10-CM

## 2024-05-07 PROCEDURE — 93000 ELECTROCARDIOGRAM COMPLETE: CPT

## 2024-05-07 PROCEDURE — G2211 COMPLEX E/M VISIT ADD ON: CPT

## 2024-05-07 PROCEDURE — 99215 OFFICE O/P EST HI 40 MIN: CPT

## 2024-05-07 RX ORDER — FLUTICASONE PROPIONATE 50 UG/1
50 SPRAY, METERED NASAL
Refills: 0 | Status: DISCONTINUED | COMMUNITY
Start: 2020-06-17 | End: 2024-05-07

## 2024-05-07 RX ORDER — FLUTICASONE PROPIONATE 50 MCG
50 SPRAY, SUSPENSION NASAL DAILY
Refills: 0 | Status: ACTIVE | COMMUNITY

## 2024-05-07 RX ORDER — CLOZAPINE 100 MG/1
100 TABLET ORAL
Refills: 0 | Status: ACTIVE | COMMUNITY

## 2024-05-07 RX ORDER — CEFPODOXIME PROXETIL 100 MG
1 TABLET ORAL
Qty: 14 | Refills: 0
Start: 2024-05-07 | End: 2024-05-13

## 2024-05-07 RX ORDER — PERPHENAZINE 4 MG/1
4 TABLET ORAL TWICE DAILY
Refills: 0 | Status: ACTIVE | COMMUNITY

## 2024-05-07 RX ORDER — DOCUSATE SODIUM 100 MG/1
100 CAPSULE ORAL AT BEDTIME
Refills: 0 | Status: ACTIVE | COMMUNITY

## 2024-05-07 RX ORDER — DOCUSATE SODIUM 100 MG
100 TABLET ORAL TWICE DAILY
Refills: 0 | Status: DISCONTINUED | COMMUNITY
End: 2024-05-07

## 2024-05-07 RX ORDER — LORATADINE 10 MG
17 TABLET,DISINTEGRATING ORAL
Refills: 0 | Status: ACTIVE | COMMUNITY

## 2024-05-07 RX ORDER — SENNOSIDES 8.6 MG TABLETS 8.6 MG/1
8.6 TABLET ORAL
Refills: 0 | Status: ACTIVE | COMMUNITY

## 2024-05-07 NOTE — PHYSICAL EXAM
[General Appearance - Well Developed] : well developed [Well Groomed] : well groomed [General Appearance - Well Nourished] : well nourished [No Deformities] : no deformities [General Appearance - In No Acute Distress] : no acute distress [FreeTextEntry1] : Responding appropriately.  a little anxious. no aggressive behaviour. answers questions.  [Normal Conjunctiva] : the conjunctiva exhibited no abnormalities [Eyelids - No Xanthelasma] : the eyelids demonstrated no xanthelasmas [Normal Oral Mucosa] : normal oral mucosa [No Oral Pallor] : no oral pallor [No Oral Cyanosis] : no oral cyanosis [Normal Jugular Venous A Waves Present] : normal jugular venous A waves present [Normal Jugular Venous V Waves Present] : normal jugular venous V waves present [No Jugular Venous Montelongo A Waves] : no jugular venous montelongo A waves [Respiration, Rhythm And Depth] : normal respiratory rhythm and effort [Exaggerated Use Of Accessory Muscles For Inspiration] : no accessory muscle use [Auscultation Breath Sounds / Voice Sounds] : lungs were clear to auscultation bilaterally [Heart Rate And Rhythm] : heart rate and rhythm were normal [Heart Sounds] : normal S1 and S2 [Murmurs] : no murmurs present [Abdomen Soft] : soft [Abdomen Tenderness] : non-tender [Abdomen Mass (___ Cm)] : no abdominal mass palpated [Abnormal Walk] : normal gait [Gait - Sufficient For Exercise Testing] : the gait was sufficient for exercise testing [Nail Clubbing] : no clubbing of the fingernails [Cyanosis, Localized] : no localized cyanosis [Petechial Hemorrhages (___cm)] : no petechial hemorrhages [Skin Color & Pigmentation] : normal skin color and pigmentation [] : no rash [No Venous Stasis] : no venous stasis [Skin Lesions] : no skin lesions [No Skin Ulcers] : no skin ulcer [No Xanthoma] : no  xanthoma was observed [Oriented To Time, Place, And Person] : oriented to person, place, and time [Affect] : the affect was normal [Mood] : the mood was normal [No Anxiety] : not feeling anxious

## 2024-05-07 NOTE — HISTORY OF PRESENT ILLNESS
[FreeTextEntry1] :  Abnormal EKG.    HPI for today: : he still has intermittent chest dicomfort when he walks fast. he has some tachycardia.  no significant events on holter monitor.   no dyspnea on exertion .  no syncope   old note: This is a 27 year old male with   history of  autism,  lives in group home, cerebral palsy, ADHD, bipolar,. here with high blood pressure and abnoraml EKG. denies any symptoms.  he has been having increase aggressive behaviours and have troopuble redirecting him.  and he was taken to ER for behaviour;.  in the ER, his EKg wshowed RVH. so he was referred to cardiolgoist last appt with me was 3 years ago no chest pain./ no dyspnea on exertion . no dizzines.s nosyncope. no palpitaitnos he has started on clonazepam.  was referred to cardiology for possible holter evaluation     Old note:  : feels good. denies complaints. feels good. NO ehadaches. nodizziness. no chest pain. .no dyspnea on exertio   old note: This is a 24 year old male with history of  autism,  lives in group home, cerebral palsy, ADHD, bipolar,. here with high blood pressure and abnoraml EKG. patient comunnicated has ticks and abnormal commmunicationsl. understands questions. answers appropriately. 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 he is short of breathe.  he states his blood pressure is elevated. His mother had spinal surgery and hypertension  his father: had heart problem. he drank alcochol. chest surgery.  +_ coughing a lot. and difficulty breathing

## 2024-05-07 NOTE — REASON FOR VISIT
[FreeTextEntry1] : abnormal EKG. \par   [Initial Evaluation] : an initial evaluation of [FreeTextEntry2] : out of breathe ,  elevated  blood pressure

## 2024-05-07 NOTE — ED ADULT NURSE NOTE - OBJECTIVE STATEMENT
bib staff member from group [facility], 29 y/o male hx cerebral palsy, autism, c/o x1 day of dysuria/penile pain. no fever, no abd pain, no vomiting. denies being sexually active. reports some discomfort left testicle. no S&S abuse- pt states he feels safe @ group [facility].

## 2024-05-07 NOTE — DISCUSSION/SUMMARY
[Patient] : the patient [Risks] : risks [Alternatives] : alternatives [Benefits] : benefits [With PCP] : with ~his/her~ primary care provider [___ Year(s)] : in [unfilled] year(s) [FreeTextEntry1] : This is a 27 year old male with autism,  asthma, bipolar, ADHD and on multiple meds here w abnormal EKG      1) ABNORMAL EKG:     no cardaic symptoms.   unremarkable echo.  2) referred for holter evalun:   unremarkable :  no significnat event.s   no further cardiac work up is needed.      [EKG obtained to assist in diagnosis and management of assessed problem(s)] : EKG obtained to assist in diagnosis and management of assessed problem(s)

## 2024-05-07 NOTE — CARDIOLOGY SUMMARY
[de-identified] : 5 7 20-24: Sinus Rhythm  -RSR(V1) -possible incomplete right bundle branch block and anterior fascicular block.  ABNORMAL 6/21/2023  Sinus  Rhythm  -Left axis for age.     [de-identified] : Sept 2023 : unremarkable .  no significant events.  [de-identified] : aug 2023: Normal LVEf. normal Rv . mild MR  [___] : [unfilled] [LVEF ___%] : LVEF [unfilled]% [Normal] : normal LA size [None] : no mitral regurgitation

## 2024-05-12 NOTE — ED PROVIDER NOTE - OBJECTIVE STATEMENT
12-May-2024 00:52 28 yo male with hx of cerebral palsy, intellectual disability, bipolar disorder, schizoaffective disorder presents to the ED for headache. as per patient's home staff member patient was agitated and was yelling and fighting with staff. patient now hs headache. Denies vision changes, loss of strength or sensation.

## 2024-06-21 ENCOUNTER — NON-APPOINTMENT (OUTPATIENT)
Age: 29
End: 2024-06-21

## 2024-07-21 NOTE — ED ADULT TRIAGE NOTE - NS ED TRIAGE AVPU SCALE
Alert-The patient is alert, awake and responds to voice. The patient is oriented to time, place, and person. The triage nurse is able to obtain subjective information.
(E4) spontaneous

## 2024-07-22 NOTE — ED PROVIDER NOTE - OBJECTIVE STATEMENT
Protocol For Photochemotherapy For Severe Photoresponsive Dermatoses: Tar And Broad Band Uvb (Goeckerman Treatment): The patient received Photochemotherapy for severe photoresponsive dermatoses: Tar and Broad Band UVB (Goeckerman treatment) requiring at least 4 to 8 hours of care under direct physician supervision. Protocol For Photochemotherapy For Severe Photoresponsive Dermatoses: Tar And Nbuvb (Goeckerman Treatment): The patient received Photochemotherapy for severe photoresponsive dermatoses: Tar and NBUVB (Goeckerman treatment) requiring at least 4 to 8 hours of care under direct physician supervision. Protocol For Broad Band Uvb: The patient received Broad Band UVB. Name Of Supervising Technician: Hyun Sierra MA Protocol For Protocol For Photochemotherapy For Severe Photoresponsive Dermatoses: Bath Puva: The patient received Photochemotherapy for severe photoresponsive dermatoses: Bath PUVA requiring at least 4 to 8 hours of care under direct physician supervision. Treatment Number: 14 Protocol For Photochemotherapy: Tar And Nbuvb (Goeckerman Treatment): The patient received Photochemotherapy: Tar and NBUVB (Goeckerman treatment). Protocol For Uva1: The patient received UVA1. Post-Care Instructions: I reviewed with the patient in detail post-care instructions. Patient is to wear sun protection. Patients may expect sunburn like redness, discomfort and scabbing. Protocol For Photochemotherapy: Baby Oil And Nbuvb: The patient received Photochemotherapy: Baby Oil and NBUVB (baby oil applied to all lesions prior to phototherapy). Consent: Written consent obtained.  The risks were reviewed with the patient including but not limited to: burn, pigmentary changes, pain, blistering, scabbing, redness, increased risk of skin cancers, and the remote possibility of scarring. Protocol For Photochemotherapy For Severe Photoresponsive Dermatoses: Petrolatum And Nbuvb: The patient received Photochemotherapy for severe photoresponsive dermatoses: Petrolatum and NBUVB requiring at least 4 to 8 hours of care under direct physician supervision. Protocol For Photochemotherapy: Tar And Broad Band Uvb (Goeckerman Treatment): The patient received Photochemotherapy: Tar and Broad Band UVB (Goeckerman treatment). Skin Type: III Protocol For Photochemotherapy: Petrolatum And Nbuvb: The patient received Photochemotherapy: Petrolatum and NBUVB (petrolatum applied to all lesions prior to phototherapy). Protocol For Photochemotherapy For Severe Photoresponsive Dermatoses: Petrolatum And Broad Band Uvb: The patient received Photochemotherapyfor severe photoresponsive dermatoses: Petrolatum and Broad Band UVB requiring at least 4 to 8 hours of care under direct physician supervision. Protocol For Photochemotherapy: Petrolatum And Broad Band Uvb: The patient received Photochemotherapy: Petrolatum and Broad Band UVB. Protocol For Nbuvb: The patient received NBUVB. Comments On Previous Treatment: Pt denies any redness or discomfort from previous treatment Protocol For Photochemotherapy: Mineral Oil And Nbuvb: The patient received Photochemotherapy: Mineral Oil and NBUVB (mineral oil applied to all lesions prior to phototherapy). Protocol For Nb Uva: The patient received NB UVA. Render Post-Care In The Note: no Protocol For Puva: The patient received PUVA. Detail Level: Zone Protocol For Photochemotherapy: Triamcinolone Ointment And Nbuvb: The patient received Photochemotherapy: Triamcinolone and NBUVB (triamcinolone ointment applied to all lesions prior to phototherapy). Protocol For Bath Puva: The patient received Bath PUVA. Protocol For Photochemotherapy: Mineral Oil And Broad Band Uvb: The patient received Photochemotherapy: Mineral Oil and Broad Band UVB. Total Treatment Time: 1 minute 45 seconds Protocol For Uva: The patient received UVA. Changes In Treatment Protocol: Per protocol, treatment time increased. Protocol For Photochemotherapy For Severe Photoresponsive Dermatoses: Puva: The patient received Photochemotherapy for severe photoresponsive dermatoses: PUVA requiring at least 4 to 8 hours of care under direct physician supervision. Protocol: NBUVB Pt presenting to the ER for COVID-19 testing. Denies fevers chills, loss of taste or smell, URI symptoms, chest pain or shortness of breath, nausea vomiting diarrhea abdominal pain, weakness or fatigue. Eating and drinking normal diet. Normal output. Pt requesting testing at this time.   known exposure at group home.   no travel  non-smoker, no sig personal or family hx of card dz.

## 2024-08-02 NOTE — ED BEHAVIORAL HEALTH ASSESSMENT NOTE - NSBHMSEEYE_PSY_A_CORE
Blood work    Call to schedule mammogram for end of August     Tdap recommended    Call to schedule colonoscopy for December 2024    Shingrix shingles vaccine recommended           
Poor

## 2024-09-07 ENCOUNTER — EMERGENCY (EMERGENCY)
Facility: HOSPITAL | Age: 29
LOS: 1 days | Discharge: HOME | End: 2024-09-07
Attending: EMERGENCY MEDICINE
Payer: MEDICARE

## 2024-09-07 VITALS
TEMPERATURE: 98 F | RESPIRATION RATE: 18 BRPM | WEIGHT: 130.07 LBS | HEIGHT: 66 IN | HEART RATE: 124 BPM | SYSTOLIC BLOOD PRESSURE: 138 MMHG | OXYGEN SATURATION: 98 % | DIASTOLIC BLOOD PRESSURE: 76 MMHG

## 2024-09-07 PROCEDURE — 99284 EMERGENCY DEPT VISIT MOD MDM: CPT | Mod: GC

## 2024-09-07 PROCEDURE — 99283 EMERGENCY DEPT VISIT LOW MDM: CPT

## 2024-09-07 RX ORDER — SODIUM CHLORIDE 9 MG/ML
1000 INJECTION INTRAMUSCULAR; INTRAVENOUS; SUBCUTANEOUS ONCE
Refills: 0 | Status: COMPLETED | OUTPATIENT
Start: 2024-09-07 | End: 2024-09-07

## 2024-09-07 RX ORDER — ACETAMINOPHEN 325 MG/1
650 TABLET ORAL ONCE
Refills: 0 | Status: COMPLETED | OUTPATIENT
Start: 2024-09-07 | End: 2024-09-07

## 2024-09-07 RX ORDER — BENZONATATE 100 MG
100 CAPSULE ORAL ONCE
Refills: 0 | Status: COMPLETED | OUTPATIENT
Start: 2024-09-07 | End: 2024-09-07

## 2024-09-07 RX ADMIN — ACETAMINOPHEN 650 MILLIGRAM(S): 325 TABLET ORAL at 11:41

## 2024-09-07 RX ADMIN — Medication 100 MILLIGRAM(S): at 11:41

## 2024-09-07 RX ADMIN — SODIUM CHLORIDE 1000 MILLILITER(S): 9 INJECTION INTRAMUSCULAR; INTRAVENOUS; SUBCUTANEOUS at 11:40

## 2024-09-07 NOTE — ED PROVIDER NOTE - CLINICAL SUMMARY MEDICAL DECISION MAKING FREE TEXT BOX
30 y/o male hx cerebral palsy, autism, presenting with c/o cough, chills and sweats that began yesterday. Pt improved with IV NS, benzonate and acetaminophen. Stable for DC.

## 2024-09-07 NOTE — ED ADULT TRIAGE NOTE - GLASGOW COMA SCALE: EYE OPENING, MLM
Detail Level: Generalized
General Sunscreen Counseling: I recommended a broad spectrum sunscreen with a SPF of 30 or higher.  I explained that SPF 30 sunscreens block approximately 97 percent of the sun's harmful rays.  Sunscreens should be applied at least 15 minutes prior to expected sun exposure and then every 2 hours after that as long as sun exposure continues. If swimming or exercising sunscreen should be reapplied every 45 minutes to an hour after getting wet or sweating.  One ounce, or the equivalent of a shot glass full of sunscreen, is adequate to protect the skin not covered by a bathing suit. I also recommended a lip balm with a sunscreen as well. Sun protective clothing can be used in lieu of sunscreen but must be worn the entire time you are exposed to the sun's rays.
Products Recommended: Colorscience, EltaMD UV clear, Blue lizard, any broad spectrum zinc base sunscreen-reapply often, Revision Intellashade
(E4) spontaneous

## 2024-09-07 NOTE — ED ADULT NURSE REASSESSMENT NOTE - NS ED NURSE REASSESS COMMENT FT1
Group home came to  pt. Pt stated he is feeling so much better, denies HA, dizziness, SOB, N/V/D, CP, palpitations, fevers, chills, pain at this time. Belonging returned. IV discontinued.

## 2024-09-07 NOTE — ED ADULT TRIAGE NOTE - CHIEF COMPLAINT QUOTE
C/o nausea, anxiety, and vomiting. He states, "I was in the backyard throwing up and I don't feel good". +Abrasion to RLE. Pt resides at [facility], home aide at bedside. Hx of Intellectual disability, Bipolar, ADHD, and CP.

## 2024-09-07 NOTE — ED PROVIDER NOTE - ATTENDING CONTRIBUTION TO CARE
I, Willam Pelayo MD, performed the initial face to face bedside interview with this patient regarding history of present illness, review of symptoms and relevant past medical, social and family history.  I completed an independent physical examination.  I was the initial provider who evaluated this patient. I have signed out the follow up of any pending tests (i.e. labs, radiological studies) to the resident.  I have communicated the patient’s plan of care and disposition with the resident.    29 year old male PMHx cerebral palsy, intelllectual disability c/o cough. AVSS. PE unremarkable. viral URI. supportive care

## 2024-09-07 NOTE — ED PROVIDER NOTE - OBJECTIVE STATEMENT
28 y/o male hx cerebral palsy, autism, presenting with c/o cough, chills and sweats that began yesterday. Pt states he is having a productive green sputum heavy cough and now has some chills. He had a coughing fit this AM that led to emesis. He denies any CP, SOB, nausea or diarrhea. He took one tylenol yesterday and some cough medication with moderate improvement of symptoms.

## 2024-09-07 NOTE — ED PROVIDER NOTE - PATIENT PORTAL LINK FT
You can access the FollowMyHealth Patient Portal offered by [facility] by registering at the following website: [email or web address]. By joining [facility]’s FollowMyHealth portal, you will also be able to view your health information using other applications (apps) compatible with our system.

## 2024-09-07 NOTE — ED PROVIDER NOTE - IV ALTEPLASE INCLUSION HIDDEN
show MD to consider neurology consult given right facial droop and lingual deviation to the right MD to consider neurology consult given right facial droop and lingual deviation to the right

## 2024-09-07 NOTE — ED ADULT NURSE NOTE - NSFALLRISKINTERV_ED_ALL_ED

## 2024-09-07 NOTE — ED ADULT NURSE NOTE - OBJECTIVE STATEMENT
c/o cough and chills x 2 days. Pt stated he was coughing so much  to episode of emesis. Pt denies HA, dizziness, SOB, N/D, Cp, palpitations. Pt awake and alert, baseline mental per aid at bedside, respirations even and unlabored on RA, skin warm and dry.

## 2024-09-07 NOTE — ED PROVIDER NOTE - NSFOLLOWUPINSTRUCTIONS_ED_ALL_ED_FT
1. Return to ED for worsening, progressive or any other concerning symptoms   2. Follow up with your primary care doctor in 2-3days    Cough    Coughing is a reflex that clears your throat and your airways. Coughing helps to heal and protect your lungs. It is normal to cough occasionally, but a cough that happens with other symptoms or lasts a long time may be a sign of a condition that needs treatment. Coughing may be caused by infections, asthma or COPD, smoking, postnasal drip, gastroesophageal reflux, as well as other medical conditions. Take medicines only as instructed by your health care provider. Avoid environments or triggers that causes you to cough at work or at home.    SEEK IMMEDIATE MEDICAL CARE IF YOU HAVE ANY OF THE FOLLOWING SYMPTOMS: coughing up blood, shortness of breath, rapid heart rate, chest pain, unexplained weight loss or night sweats.

## 2024-10-03 NOTE — ED ADULT NURSE NOTE - ISOLATION TYPE:
Bed: [facility]20  Expected date:   Expected time:   Means of arrival:   Comments:  [facility] - 89F Epigastric Pain     None

## 2024-10-11 NOTE — ED STATDOCS - CHPI ED RELIEVING FACTORS
Detail Level: Detailed Detail Level: Simple Detail Level: Zone nothing Topical Retinoids Recommendations: OC8

## 2024-10-19 NOTE — ED BEHAVIORAL HEALTH ASSESSMENT NOTE - PERSONAL COLLATERAL RELATIONSHIP
[facility] contacted [facility]- [phone] to to follow up on the referral. [facility] was told that there was no one available from admission at this time.   staff from group home

## 2025-01-28 NOTE — ED BEHAVIORAL HEALTH ASSESSMENT NOTE - MUSCLE TONE / STRENGTH
Nicholas, Cherise L, NP has gone through these results via [employer].   Normal muscle tone/strength

## 2025-03-12 ENCOUNTER — OFFICE (OUTPATIENT)
Dept: URBAN - METROPOLITAN AREA CLINIC 112 | Facility: CLINIC | Age: 30
Setting detail: OPHTHALMOLOGY
End: 2025-03-12
Payer: MEDICARE

## 2025-03-12 DIAGNOSIS — H11.153: ICD-10-CM

## 2025-03-12 PROCEDURE — 92012 INTRM OPH EXAM EST PATIENT: CPT | Performed by: OPHTHALMOLOGY

## 2025-03-12 ASSESSMENT — KERATOMETRY
OD_AXISANGLE_DEGREES: 090
OS_K2POWER_DIOPTERS: 43.25
OD_K2POWER_DIOPTERS: 43.00
OS_K1POWER_DIOPTERS: 43.00
OD_K1POWER_DIOPTERS: 43.00
OS_AXISANGLE_DEGREES: 042

## 2025-03-12 ASSESSMENT — REFRACTION_AUTOREFRACTION
OD_SPHERE: +1.00
OS_AXIS: 081
OD_AXIS: 087
OS_CYLINDER: -0.75
OS_SPHERE: +1.00
OD_CYLINDER: -0.75

## 2025-03-12 ASSESSMENT — TONOMETRY
OS_IOP_MMHG: 11
OD_IOP_MMHG: 13

## 2025-03-12 ASSESSMENT — CONFRONTATIONAL VISUAL FIELD TEST (CVF)
OS_FINDINGS: FULL
OD_FINDINGS: FULL

## 2025-03-12 ASSESSMENT — VISUAL ACUITY
OS_BCVA: 20/25-1
OD_BCVA: 20/25

## 2025-04-16 NOTE — ED ADULT TRIAGE NOTE - HEIGHT IN INCHES
Ultrasound today showed:  Mild heterogeneous appearance of the left testicle, improved from 03/25/2025, and favored to represent evolving changes of testicular trauma, likely testicle contusion.  Note that, at this time, other underlying testicular pathology (such as neoplasm leukemia, or lymphoma, etc.) is not fully excluded.  Recommendations include clinical correlation, and repeat sonographic follow-up within several weeks.     Alterations in the contour of the left testicle raise suspicion for possible testicular capsular injury.  Recommendations include clinical correlation, and urology consultation if clinically appropriate.  Stable left varicocele.  Stable fluid-filled left inguinal hernia.  Small right epididymal cyst.    Follow back up with urology for further evaluation.  
11

## 2025-05-28 NOTE — ED ADULT NURSE NOTE - SUICIDE SCREENING QUESTION 3
4 Eyes Skin Assessment Completed by SWATI Ellsworth and SWATI Ferreira.     Skin assessment is primarily focused on high risk bony prominences. Pay special attention to skin beneath and around medical devices, high risk bony prominences, skin to skin areas and areas where the patient lacks sensation to feel pain and areas where the patient previously had breakdown.      Head (Occipital):  WDL   Ears (Under Medical Devices): WDL   Nose (Under Medical Devices): WDL   Mouth:  WDL   Neck: Scar to anterior neck and posterior neck.   Breast/Chest:  Scarring   Shoulder Blades:  WDL   Spine:   WDL   (R) Arm/Elbow/Hand: Scarring, dryness, discoloration   (L) Arm/Elbow/Hand: Scarring, dryness, discoloration   Abdomen: LLQ ostomy, suprapubic catheter - skin intact, scarring   Pannus/Groin:  Wound vac to penis and scrotum. Scrotal edema.   Sacrum/Coccyx:   Pink scarring, slow to soham, flaky   (R) Ischial Tuberosity (Sit Bones):  Scarring   (L) Ischial Tuberosity (Sit Bones):  Scarring   (R) Leg:  Scarring, dry, discoloration   (L) Leg:  Scarring, dry, discoloration   (R) Heel:  Pink and blanching, boggy   (R) Foot/Toe: Dryness, edema, discoloration   (L) Heel: Brown and intact DTI to posterior heel, boggy and slow to soham, discoloration   (L) Foot/Toe:  Dryness, edema, discoloration         DEVICES IN USE:   Respiratory Devices:  NA, patient on room air  Feeding Devices:  N/A   Lines & BP Monitoring Devices:  Peripheral IV and Pulse ox    Orthopedic Devices:  N/A  Miscellaneous Devices:  Ulta wound vac (hospital vac) and SCDs, suprapubic catheter, ostomy appliance     PROTOCOL INTERVENTIONS:   Low Airloss Bed:  Already in place  Offloading Dressing - Sacrum:  Already in place  Offloading Dressing - Heel:  Already in place  Heel Float Boots:  Already in place  Q2 Turns with Pillows:  Already in place  Glide Sheet:  Already in place  Martinez:  Already in place - suprapubic catheter     WOUND PHOTOS:   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

## 2025-07-08 NOTE — ED ADULT NURSE NOTE - NSFALLRSKASSESASSIST_ED_ALL_ED
Airway  Date/Time: 7/8/2025 8:20 AM  Reason: elective      Staffing  Performed: [facility]   Authorized by: Primo Garcia MD    Performed by: PRIYA Armstrong  Patient location during procedure: OR    Patient Condition  Indications for airway management: anesthesia  Sedation level: deep     Final Airway Details   Preoxygenated: yes  Final airway type: supraglottic airway  Successful airway:   Size: 4  Number of attempts at approach: 1           no

## 2025-07-10 ENCOUNTER — APPOINTMENT (OUTPATIENT)
Dept: PODIATRY | Facility: CLINIC | Age: 30
End: 2025-07-10

## 2025-07-24 ENCOUNTER — APPOINTMENT (OUTPATIENT)
Dept: PODIATRY | Facility: CLINIC | Age: 30
End: 2025-07-24
Payer: MEDICARE

## 2025-07-24 DIAGNOSIS — M79.674 PAIN IN RIGHT TOE(S): ICD-10-CM

## 2025-07-24 DIAGNOSIS — M79.675 PAIN IN LEFT TOE(S): ICD-10-CM

## 2025-07-24 DIAGNOSIS — L60.0 INGROWING NAIL: ICD-10-CM

## 2025-07-24 PROCEDURE — 99203 OFFICE O/P NEW LOW 30 MIN: CPT

## 2025-07-24 PROCEDURE — 99213 OFFICE O/P EST LOW 20 MIN: CPT

## 2025-07-28 PROBLEM — M79.675 PAIN OF TOE OF LEFT FOOT: Status: ACTIVE | Noted: 2025-07-28

## 2025-07-28 PROBLEM — L60.0 INGROWN LEFT BIG TOENAIL: Status: ACTIVE | Noted: 2025-07-28

## 2025-07-28 PROBLEM — L60.0 INGROWN RIGHT BIG TOENAIL: Status: ACTIVE | Noted: 2025-07-28

## 2025-07-28 PROBLEM — M79.674 PAIN OF TOE OF RIGHT FOOT: Status: ACTIVE | Noted: 2025-07-28
